# Patient Record
Sex: MALE | Race: WHITE | NOT HISPANIC OR LATINO | Employment: OTHER | URBAN - METROPOLITAN AREA
[De-identification: names, ages, dates, MRNs, and addresses within clinical notes are randomized per-mention and may not be internally consistent; named-entity substitution may affect disease eponyms.]

---

## 2017-02-28 ENCOUNTER — ALLIED HEALTH/NURSE VISIT (OUTPATIENT)
Dept: FAMILY MEDICINE | Facility: CLINIC | Age: 52
End: 2017-02-28
Payer: COMMERCIAL
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
## 2017-02-28 DIAGNOSIS — Z23 NEED FOR VACCINATION: Primary | ICD-10-CM

## 2017-02-28 PROCEDURE — 90471 IMMUNIZATION ADMIN: CPT

## 2017-02-28 PROCEDURE — 99207 ZZC NO CHARGE NURSE ONLY: CPT

## 2017-02-28 PROCEDURE — 90714 TD VACC NO PRESV 7 YRS+ IM: CPT

## 2017-02-28 NOTE — NURSING NOTE
Screening Questionnaire for Adult Immunization    Are you sick today?   No   Do you have allergies to medications, food, a vaccine component or latex?   No   Have you ever had a serious reaction after receiving a vaccination?   No   Do you have a long-term health problem with heart disease, lung disease, asthma, kidney disease, metabolic disease (e.g. diabetes), anemia, or other blood disorder?   No   Do you have cancer, leukemia, HIV/AIDS, or any other immune system problem?   No   In the past 3 months, have you taken medications that affect  your immune system, such as prednisone, other steroids, or anticancer drugs; drugs for the treatment of rheumatoid arthritis, Crohn s disease, or psoriasis; or have you had radiation treatments?   No   Have you had a seizure, or a brain or other nervous system problem?   No   During the past year, have you received a transfusion of blood or blood     products, or been given immune (gamma) globulin or antiviral drug?   No   For women: Are you pregnant or is there a chance you could become        pregnant during the next month?   No   Have you received any vaccinations in the past 4 weeks?   No     Immunization questionnaire answers were all negative.      MNVFC doesn't apply on this patient    Per orders of  , injection of TD given by Pina Nettles. Patient instructed to remain in clinic for 20 minutes afterwards, and to report any adverse reaction to me immediately.       Screening performed by Pina Nettles on 2/28/2017 at 3:24 PM.

## 2017-02-28 NOTE — MR AVS SNAPSHOT
"              After Visit Summary   2017    Chepe Elkins    MRN: [id number]           Patient Information     Date Of Birth          1965        Visit Information        Provider Department      2017 3:15 PM FL NIKKI EDWARDS/LPN [facility]        Today's Diagnoses     Need for vaccination    -  1    Puncture wound of skin from metal nail           Follow-ups after your visit        Who to contact     If you have questions or need follow up information about today's clinic visit or your schedule please contact [facility] directly at [phone].  Normal or non-critical lab and imaging results will be communicated to you by Inxerohart, letter or phone within 4 business days after the clinic has received the results. If you do not hear from us within 7 days, please contact the clinic through [email or web address] or phone. If you have a critical or abnormal lab result, we will notify you by phone as soon as possible.  Submit refill requests through [employer] or call your pharmacy and they will forward the refill request to us. Please allow 3 business days for your refill to be completed.          Additional Information About Your Visit        MyChart Information     [employer] lets you send messages to your doctor, view your test results, renew your prescriptions, schedule appointments and more. To sign up, go to [email or web address] . Click on \"Log in\" on the left side of the screen, which will take you to the Welcome page. Then click on \"Sign up Now\" on the right side of the page.     You will be asked to enter the access code listed below, as well as some personal information. Please follow the directions to create your username and password.     Your access code is: [email or web address]  Expires: 2017  3:24 PM     Your access code will  in 90 days. If you need help or a new code, please call your [facility] or [phone].        Care EveryWhere ID     This is your Care " EveryWhere ID. This could be used by other organizations to access your [place] medical records  [id number]         Blood Pressure from Last 3 Encounters:   No data found for BP    Weight from Last 3 Encounters:   No data found for Wt              We Performed the Following     1st  Administration  [[id number]]     TD PRSERV FREE >=7 YRS ADS IM [54304]        Primary Care Provider Office Phone # Fax #    Keanu Fraser [phone]       [facility] [address] [facility] [address]        Thank you!     Thank you for choosing [facility]  for your care. Our goal is always to provide you with excellent care. Hearing back from our patients is one way we can continue to improve our services. Please take a few minutes to complete the written survey that you may receive in the mail after your visit with us. Thank you!             Your Updated Medication List - Protect others around you: Learn how to safely use, store and throw away your medicines at www.disposemymeds.org.      Notice  As of 2/28/2017  3:24 PM    You have not been prescribed any medications.

## 2018-02-26 ENCOUNTER — RADIANT APPOINTMENT (OUTPATIENT)
Dept: GENERAL RADIOLOGY | Facility: CLINIC | Age: 53
End: 2018-02-26
Attending: FAMILY MEDICINE
Payer: COMMERCIAL

## 2018-02-26 ENCOUNTER — OFFICE VISIT (OUTPATIENT)
Dept: FAMILY MEDICINE | Facility: CLINIC | Age: 53
End: 2018-02-26
Payer: COMMERCIAL

## 2018-02-26 VITALS
BODY MASS INDEX: 42.66 KG/M2 | SYSTOLIC BLOOD PRESSURE: 138 MMHG | TEMPERATURE: 98.3 F | HEIGHT: 72 IN | DIASTOLIC BLOOD PRESSURE: 74 MMHG | WEIGHT: 315 LBS | RESPIRATION RATE: 20 BRPM | HEART RATE: 84 BPM

## 2018-02-26 DIAGNOSIS — N52.9 ERECTILE DYSFUNCTION, UNSPECIFIED ERECTILE DYSFUNCTION TYPE: ICD-10-CM

## 2018-02-26 DIAGNOSIS — Z23 NEED FOR PROPHYLACTIC VACCINATION AND INOCULATION AGAINST INFLUENZA: ICD-10-CM

## 2018-02-26 DIAGNOSIS — M25.561 ACUTE PAIN OF RIGHT KNEE: ICD-10-CM

## 2018-02-26 DIAGNOSIS — Z00.00 ROUTINE GENERAL MEDICAL EXAMINATION AT A HEALTH CARE FACILITY: Primary | ICD-10-CM

## 2018-02-26 DIAGNOSIS — Z13.6 CARDIOVASCULAR SCREENING; LDL GOAL LESS THAN 130: ICD-10-CM

## 2018-02-26 DIAGNOSIS — R35.0 URINARY FREQUENCY: ICD-10-CM

## 2018-02-26 DIAGNOSIS — E66.01 MORBID OBESITY (H): ICD-10-CM

## 2018-02-26 DIAGNOSIS — Z12.11 SPECIAL SCREENING FOR MALIGNANT NEOPLASMS, COLON: ICD-10-CM

## 2018-02-26 LAB
ALBUMIN UR-MCNC: NEGATIVE MG/DL
APPEARANCE UR: CLEAR
BILIRUB UR QL STRIP: NEGATIVE
COLOR UR AUTO: YELLOW
GLUCOSE UR STRIP-MCNC: 500 MG/DL
HGB UR QL STRIP: NEGATIVE
KETONES UR STRIP-MCNC: NEGATIVE MG/DL
LEUKOCYTE ESTERASE UR QL STRIP: NEGATIVE
NITRATE UR QL: NEGATIVE
PH UR STRIP: 5.5 PH (ref 5–7)
SOURCE: ABNORMAL
SP GR UR STRIP: 1.01 (ref 1–1.03)
UROBILINOGEN UR STRIP-ACNC: 0.2 EU/DL (ref 0.2–1)

## 2018-02-26 PROCEDURE — 90686 IIV4 VACC NO PRSV 0.5 ML IM: CPT | Performed by: FAMILY MEDICINE

## 2018-02-26 PROCEDURE — 73560 X-RAY EXAM OF KNEE 1 OR 2: CPT | Mod: RT

## 2018-02-26 PROCEDURE — 99213 OFFICE O/P EST LOW 20 MIN: CPT | Mod: 25 | Performed by: FAMILY MEDICINE

## 2018-02-26 PROCEDURE — 99396 PREV VISIT EST AGE 40-64: CPT | Performed by: FAMILY MEDICINE

## 2018-02-26 PROCEDURE — 81003 URINALYSIS AUTO W/O SCOPE: CPT | Performed by: FAMILY MEDICINE

## 2018-02-26 PROCEDURE — 90471 IMMUNIZATION ADMIN: CPT | Performed by: FAMILY MEDICINE

## 2018-02-26 RX ORDER — SILDENAFIL 50 MG/1
50 TABLET, FILM COATED ORAL DAILY PRN
Qty: 6 TABLET | Refills: 11 | Status: SHIPPED | OUTPATIENT
Start: 2018-02-26 | End: 2018-02-28

## 2018-02-26 ASSESSMENT — PAIN SCALES - GENERAL: PAINLEVEL: MILD PAIN (2)

## 2018-02-26 NOTE — MR AVS SNAPSHOT
After Visit Summary   2/26/2018    Chepe Elkins    MRN: [id number]           Patient Information     Date Of Birth          1965        Visit Information        Provider Department      2/26/2018 6:20 PM Bean Eduardo MD [facility]        Today's Diagnoses     Routine general medical examination at a health care facility    -  1    Morbid obesity (H)        Erectile dysfunction, unspecified erectile dysfunction type        Acute pain of right knee        Urinary frequency        CARDIOVASCULAR SCREENING; LDL GOAL LESS THAN 130        Special screening for malignant neoplasms, colon        Need for prophylactic vaccination and inoculation against influenza          Care Instructions      Preventive Health Recommendations  Male Ages 50 - 64    Yearly exam:             See your health care provider every year in order to  o   Review health changes.   o   Discuss preventive care.    o   Review your medicines if your doctor has prescribed any.     Have a cholesterol test every 5 years, or more frequently if you are at risk for high cholesterol/heart disease.     Have a diabetes test (fasting glucose) every three years. If you are at risk for diabetes, you should have this test more often.     Have a colonoscopy at age 50, or have a yearly FIT test (stool test). These exams will check for colon cancer.      Talk with your health care provider about whether or not a prostate cancer screening test (PSA) is right for you.    You should be tested each year for STDs (sexually transmitted diseases), if you re at risk.     Shots: Get a flu shot each year. Get a tetanus shot every 10 years.     Nutrition:    Eat at least 5 servings of fruits and vegetables daily.     Eat whole-grain bread, whole-wheat pasta and brown rice instead of white grains and rice.     Talk to your provider about Calcium and Vitamin D.     Lifestyle    Exercise for at least 150 minutes a week (30 minutes a day, 5  days a week). This will help you control your weight and prevent disease.     Limit alcohol to one drink per day.     No smoking.     Wear sunscreen to prevent skin cancer.     See your dentist every six months for an exam and cleaning.     See your eye doctor every 1 to 2 years.  ASSESSMENT/PLAN:                                                    Lifestyle recommendations:regular exercise, at least 150 minutes per week (average 30 minutes 5 times a week)  good job on losing weight!  keep working on losing weight (ideal BMI-body mass index is <25)  always wear seat belt  The following exams/tests were recommended and discussed for health maintenance:  Colon cancer screening options most commonly used are: 1. Colonoscopy (colon scope) and 2. FIT (stool test).  FIT testing to check for hidden blood in the stool is a colon cancer screening test which should be done once yearly if normal.  If abnormal, a colonoscopy is recommended.    Prostate cancer screening is optional starting at age 50 if normal risk, age 40 or 45 for high risk men (strong family history or blacks.)  Screening can include digital rectal exam and PSA blood test.  Prostate cancer screening-PSA test due to bladder symptoms.     (Z00.00) Routine general medical examination at a health care facility  (primary encounter diagnosis)    (E66.01) Morbid obesity (H)  Comment:   Plan: Basic metabolic panel, Lipid panel reflex to         direct LDL Fasting          (N52.9) Erectile dysfunction, unspecified erectile dysfunction type  Comment:   Plan: Check PSA  There are medications like viagra that can help with erections.   Try Viagra for erectile dysfunction.  There are several different strengths of the Viaga pills.  Take Viagra 50mg pills 30min to 4 hours before planned intercourse and food may delay absorption.  You could try 1/2 dose if side effects on the 50mg or try 100 mg (2 pills) if needed.  Taking more than 100mg will not help.  Potential side  effects including flushing, headache, and visual symptoms.  There have been rare reported cases of possibly serious vision changes including loss of vision.  Also reviewed drug interactions including nitrates-do not use with nitro medications.        (M25.561) Acute pain of right knee  Comment: There is some osteoarthritis but recent injury.   Plan: CANCELED: XR Knee Standing Right 2 Views          Ibuprofen 200mg OTC may be taken up to 4 pills 4 times a day to the maximum of 3200mg or 16 pills daily as needed for pain.   Take with food.  Don't take with aspirin, Aleve or other antiinflammatory medication or with warfarin.   OK for ice or heat.   If not improving in the next few weeks, we can do an MRI of the knee to check cartilage and ligaments.     (R35.0) Urinary frequency  Comment: May be prostate problem  Plan: PSA, screen, *UA reflex to Microscopic and         Culture ([place] [facility] (except         [place] and [place])        Check for prostate cancer.   Cut back on fluids.    If you continue to have the urine frequency, there may be medications that can help.  We sometimes check ultrasound of bladder to make sure you are emptying OK.     (Z13.6) CARDIOVASCULAR SCREENING; LDL GOAL LESS THAN 130  Comment:   Plan: Lipid panel reflex to direct LDL Fasting        Come in for fasting blood tests (8 hours)    (Z12.11) Special screening for malignant neoplasms, colon  Comment:   Plan: Fecal colorectal cancer screen (FIT)        Complete FIT colon cancer screening test and send in the mail.      (Z23) Need for prophylactic vaccination and inoculation against influenza  Comment:   Plan: FLU VAC, SPLIT VIRUS IM > 3 YO (QUADRIVALENT)         [26280], Vaccine Administration, Initial         [70386]          Follow-ups after your visit        Future tests that were ordered for you today     Open Future Orders        Priority Expected Expires Ordered    Fecal colorectal cancer screen (FIT) Routine  "3/19/2018 2018 2018    PSA, screen Routine 2018 3/26/2018 2018    Basic metabolic panel Routine 2018 3/26/2018 2018    Lipid panel reflex to direct LDL Fasting Routine 2018 3/26/2018 2018            Who to contact     If you have questions or need follow up information about today's clinic visit or your schedule please contact [facility] directly at [phone].  Normal or non-critical lab and imaging results will be communicated to you by Hyperpiahart, letter or phone within 4 business days after the clinic has received the results. If you do not hear from us within 7 days, please contact the clinic through Hyperpiahart or phone. If you have a critical or abnormal lab result, we will notify you by phone as soon as possible.  Submit refill requests through [employer] or call your pharmacy and they will forward the refill request to us. Please allow 3 business days for your refill to be completed.          Additional Information About Your Visit        [employer] Information     [employer] lets you send messages to your doctor, view your test results, renew your prescriptions, schedule appointments and more. To sign up, go to [email or web address] . Click on \"Log in\" on the left side of the screen, which will take you to the Welcome page. Then click on \"Sign up Now\" on the right side of the page.     You will be asked to enter the access code listed below, as well as some personal information. Please follow the directions to create your username and password.     Your access code is: [email or web address]  Expires: 2018  7:48 PM     Your access code will  in 90 days. If you need help or a new code, please call your [place] clinic or [phone].        Care EveryWhere ID     This is your Care EveryWhere ID. This could be used by other organizations to access your [place] medical records  [id number]        Your Vitals Were     Pulse Temperature Respirations Height BMI " "(Body Mass Index)       84 98.3  F (36.8  C) 20 6' 0.25\" (1.835 m) 42.83 kg/m2        Blood Pressure from Last 3 Encounters:   02/26/18 138/74    Weight from Last 3 Encounters:   02/26/18 (!) 318 lb (144.2 kg)              We Performed the Following     *UA reflex to Microscopic and Culture ([place] and [facility] (except [place] and [place])     FLU VAC, SPLIT VIRUS IM > 3 YO (QUADRIVALENT) [77408]     Vaccine Administration, Initial [71473]          Today's Medication Changes          These changes are accurate as of 2/26/18  7:48 PM.  If you have any questions, ask your nurse or doctor.               Start taking these medicines.        Dose/Directions    sildenafil 50 MG tablet   Commonly known as:  VIAGRA   Used for:  Erectile dysfunction, unspecified erectile dysfunction type   Started by:  Bean Eduardo MD        Dose:  50 mg   Take 1 tablet (50 mg) by mouth daily as needed 30 min to 4 hrs before sex. Do not use with nitroglycerin, terazosin or doxazosin.   Quantity:  6 tablet   Refills:  11            Where to get your medicines      These medications were sent to [facility] [address] [address]     Phone:  [phone]     sildenafil 50 MG tablet                Primary Care Provider Office Phone # Fax #    Keanu Cedrick Fraser [phone]       [facility] [address] [place] [address]        Equal Access to Services     DAVID COTTO AH: Hadii aad ku hadasho Soomaali, waaxda luqadaha, qaybta kaalmada adeegyada, laine hummel. So [facility] [phone].    ATENCIÓN: Si habla gianlucaañol, tiene a christie disposición servicios gratuitos de asistencia lingüística. Llame al [phone].    We comply with applicable federal civil rights laws and [place] laws. We do not discriminate on the basis of race, color, national origin, age, disability, sex, sexual orientation, or gender " identity.            Thank you!     Thank you for choosing [facility]  for your care. Our goal is always to provide you with excellent care. Hearing back from our patients is one way we can continue to improve our services. Please take a few minutes to complete the written survey that you may receive in the mail after your visit with us. Thank you!             Your Updated Medication List - Protect others around you: Learn how to safely use, store and throw away your medicines at www.disposemymeds.org.          This list is accurate as of 2/26/18  7:48 PM.  Always use your most recent med list.                   Brand Name Dispense Instructions for use Diagnosis    IBUPROFEN PO      Take 800 mg by mouth 2 times daily as needed for moderate pain        MULTI VITAMIN MENS PO      Take 1 tablet by mouth daily        sildenafil 50 MG tablet    VIAGRA    6 tablet    Take 1 tablet (50 mg) by mouth daily as needed 30 min to 4 hrs before sex. Do not use with nitroglycerin, terazosin or doxazosin.    Erectile dysfunction, unspecified erectile dysfunction type

## 2018-02-27 ENCOUNTER — TELEPHONE (OUTPATIENT)
Dept: FAMILY MEDICINE | Facility: CLINIC | Age: 53
End: 2018-02-27

## 2018-02-27 DIAGNOSIS — R35.0 URINARY FREQUENCY: ICD-10-CM

## 2018-02-27 DIAGNOSIS — N52.9 ERECTILE DYSFUNCTION, UNSPECIFIED ERECTILE DYSFUNCTION TYPE: ICD-10-CM

## 2018-02-27 DIAGNOSIS — E66.01 MORBID OBESITY (H): ICD-10-CM

## 2018-02-27 DIAGNOSIS — Z13.6 CARDIOVASCULAR SCREENING; LDL GOAL LESS THAN 130: ICD-10-CM

## 2018-02-27 LAB
ANION GAP SERPL CALCULATED.3IONS-SCNC: 7 MMOL/L (ref 3–14)
BUN SERPL-MCNC: 16 MG/DL (ref 7–30)
CALCIUM SERPL-MCNC: 9.3 MG/DL (ref 8.5–10.1)
CHLORIDE SERPL-SCNC: 98 MMOL/L (ref 94–109)
CHOLEST SERPL-MCNC: 181 MG/DL
CO2 SERPL-SCNC: 27 MMOL/L (ref 20–32)
CREAT SERPL-MCNC: 0.64 MG/DL (ref 0.66–1.25)
GFR SERPL CREATININE-BSD FRML MDRD: >90 ML/MIN/1.7M2
GLUCOSE SERPL-MCNC: 325 MG/DL (ref 70–99)
HDLC SERPL-MCNC: 34 MG/DL
LDLC SERPL CALC-MCNC: 93 MG/DL
NONHDLC SERPL-MCNC: 147 MG/DL
POTASSIUM SERPL-SCNC: 4.5 MMOL/L (ref 3.4–5.3)
PSA SERPL-ACNC: 0.99 UG/L (ref 0–4)
SODIUM SERPL-SCNC: 132 MMOL/L (ref 133–144)
TRIGL SERPL-MCNC: 271 MG/DL

## 2018-02-27 PROCEDURE — G0103 PSA SCREENING: HCPCS | Performed by: FAMILY MEDICINE

## 2018-02-27 PROCEDURE — 36415 COLL VENOUS BLD VENIPUNCTURE: CPT | Performed by: FAMILY MEDICINE

## 2018-02-27 PROCEDURE — 80048 BASIC METABOLIC PNL TOTAL CA: CPT | Performed by: FAMILY MEDICINE

## 2018-02-27 PROCEDURE — 80061 LIPID PANEL: CPT | Performed by: FAMILY MEDICINE

## 2018-02-27 NOTE — PATIENT INSTRUCTIONS
Preventive Health Recommendations  Male Ages 50   64    Yearly exam:             See your health care provider every year in order to  o   Review health changes.   o   Discuss preventive care.    o   Review your medicines if your doctor has prescribed any.     Have a cholesterol test every 5 years, or more frequently if you are at risk for high cholesterol/heart disease.     Have a diabetes test (fasting glucose) every three years. If you are at risk for diabetes, you should have this test more often.     Have a colonoscopy at age 50, or have a yearly FIT test (stool test). These exams will check for colon cancer.      Talk with your health care provider about whether or not a prostate cancer screening test (PSA) is right for you.    You should be tested each year for STDs (sexually transmitted diseases), if you re at risk.     Shots: Get a flu shot each year. Get a tetanus shot every 10 years.     Nutrition:    Eat at least 5 servings of fruits and vegetables daily.     Eat whole-grain bread, whole-wheat pasta and brown rice instead of white grains and rice.     Talk to your provider about Calcium and Vitamin D.     Lifestyle    Exercise for at least 150 minutes a week (30 minutes a day, 5 days a week). This will help you control your weight and prevent disease.     Limit alcohol to one drink per day.     No smoking.     Wear sunscreen to prevent skin cancer.     See your dentist every six months for an exam and cleaning.     See your eye doctor every 1 to 2 years.  ASSESSMENT/PLAN:                                                    Lifestyle recommendations:regular exercise, at least 150 minutes per week (average 30 minutes 5 times a week)  good job on losing weight!  keep working on losing weight (ideal BMI-body mass index is <25)  always wear seat belt  The following exams/tests were recommended and discussed for health maintenance:  Colon cancer screening options most commonly used are: 1. Colonoscopy (colon  scope) and 2. FIT (stool test).  FIT testing to check for hidden blood in the stool is a colon cancer screening test which should be done once yearly if normal.  If abnormal, a colonoscopy is recommended.    Prostate cancer screening is optional starting at age 50 if normal risk, age 40 or 45 for high risk men (strong family history or blacks.)  Screening can include digital rectal exam and PSA blood test.  Prostate cancer screening-PSA test due to bladder symptoms.     (Z00.00) Routine general medical examination at a health care facility  (primary encounter diagnosis)    (E66.01) Morbid obesity (H)  Comment:   Plan: Basic metabolic panel, Lipid panel reflex to         direct LDL Fasting          (N52.9) Erectile dysfunction, unspecified erectile dysfunction type  Comment:   Plan: Check PSA  There are medications like viagra that can help with erections.   Try Viagra for erectile dysfunction.  There are several different strengths of the Viaga pills.  Take Viagra 50mg pills 30min to 4 hours before planned intercourse and food may delay absorption.  You could try 1/2 dose if side effects on the 50mg or try 100 mg (2 pills) if needed.  Taking more than 100mg will not help.  Potential side effects including flushing, headache, and visual symptoms.  There have been rare reported cases of possibly serious vision changes including loss of vision.  Also reviewed drug interactions including nitrates-do not use with nitro medications.        (M25.561) Acute pain of right knee  Comment: There is some osteoarthritis but recent injury.   Plan: CANCELED: XR Knee Standing Right 2 Views          Ibuprofen 200mg OTC may be taken up to 4 pills 4 times a day to the maximum of 3200mg or 16 pills daily as needed for pain.   Take with food.  Don't take with aspirin, Aleve or other antiinflammatory medication or with warfarin.   OK for ice or heat.   If not improving in the next few weeks, we can do an MRI of the knee to check cartilage  and ligaments.     (R35.0) Urinary frequency  Comment: May be prostate problem  Plan: PSA, screen, *UA reflex to Microscopic and         Culture ([place] [facility] (except         [place] and [place])        Check for prostate cancer.   Cut back on fluids.    If you continue to have the urine frequency, there may be medications that can help.  We sometimes check ultrasound of bladder to make sure you are emptying OK.     (Z13.6) CARDIOVASCULAR SCREENING; LDL GOAL LESS THAN 130  Comment:   Plan: Lipid panel reflex to direct LDL Fasting        Come in for fasting blood tests (8 hours)    (Z12.11) Special screening for malignant neoplasms, colon  Comment:   Plan: Fecal colorectal cancer screen (FIT)        Complete FIT colon cancer screening test and send in the mail.      (Z23) Need for prophylactic vaccination and inoculation against influenza  Comment:   Plan: FLU VAC, SPLIT VIRUS IM > 3 YO (QUADRIVALENT)         [52244], Vaccine Administration, Initial         [52893]

## 2018-02-27 NOTE — PROGRESS NOTES
SUBJECTIVE:   CC: Chepe Elkins is an 52 year old male who presents for preventative health visit.   Chief Complaint   Patient presents with     Physical      Check R knee pain.  Takes ibuprofen prn.  Takes fish oil for joints.  Other joints oK.  No major injury recently he had just been walking around in the snow.  Onset of symptoms: 3-4 weeks.  Course of symptoms over time: same.    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  He had a follow-up urine which was normal.   Nocturia 3-4 times at night.  Some urgency.    Difficulty with erections.  Onset of symptoms: 7-8 months.  No problems in the past. Has erections but not as firm and not reliable.  Unable to have intercourse for months.     Healthy Habits:  Answers for HPI/ROS submitted by the patient on 2/26/2018   Annual Exam:  Getting at least 3 servings of Calcium per day:: Yes  Bi-annual eye exam:: NO  Dental care twice a year:: Yes  Sleep apnea or symptoms of sleep apnea:: None  Taking medications regularly:: Yes  Medication side effects:: None  Additional concerns today:: YES  PHQ-2 Score: 0    Today's PHQ-2 Score:   PHQ-2 ( 1999 [employer]) 2/26/2018   Q1: Little interest or pleasure in doing things 0   Q2: Feeling down, depressed or hopeless 0   PHQ-2 Score 0   Q1: Little interest or pleasure in doing things Not at all   Q2: Feeling down, depressed or hopeless Not at all   PHQ-2 Score 0     Abuse: Current or Past(Physical, Sexual or Emotional)- No  Do you feel safe in your environment - Yes    Social History   Substance Use Topics     Smoking status: Former Smoker     Years: 27.00     Types: Cigarettes     Quit date: 2/26/2010     Smokeless tobacco: Never Used     Alcohol use Yes      Comment: rarely      If you drink alcohol do you typically have >3 drinks per day or >7 drinks per week? No                      Last PSA: No results found for: PSA  There are no active problems to display for this patient.       Family history:  CV disease: uncle in his  "80s  Prostate cancer: no  Colon cancer: no    Multivitamin or Vit D use:See epic    Vaccines:current tetanus     Past Colon cancer screening:no    Occupation: construction.     ROS:  General: POSITIVE for:, weight loss, 40# in the past 2 years.   Trying to eat less.  Some exercise.   Eyes: Negative for vision changes or eye problems  ENT: No problems with ears, nose or throat.  No difficulty swallowing.  Resp: No coughing, wheezing or shortness of breath  CV: No chest pains or palpitations  GI: No nausea, vomiting,  heartburn, abdominal pain, diarrhea, constipation or change in bowel habits  : POSITIVE for:, urinary frequency every couple hours, urgency, nocturia x 4-5.  Drinks near a gallon of water a day.   Musculoskeletal: POSITIVE  for:, pain in knee  Neurologic: No headaches, numbness, tingling, weakness, problems with balance or coordination  Psychiatric: No problems with anxiety, depression or mental health  Heme/immune/allergy: No history of bleeding or clotting problems or anemia.  No allergies or immune system problems  Endocrine: No history of thyroid disease, diabetes or other endocrine disorders  Skin: No rashes,worrisome lesions or skin problems    OBJECTIVE:                                                    OBJECTIVE:Blood pressure 138/74, pulse 84, temperature 98.3  F (36.8  C), resp. rate 20, height 6' 0.25\" (1.835 m), weight (!) 318 lb (144.2 kg). BMI=Body mass index is 42.83 kg/(m^2).  GENERAL APPEARANCE ADULT: Alert, no acute distress, morbidly obese  EYES: PERRL, EOM normal, conjunctiva and lids normal  HENT: Ears and TMs normal, oral mucosa and posterior oropharynx normal  NECK: No adenopathy,masses or thyromegaly  RESP: lungs clear to auscultation   CV: normal rate, regular rhythm, no murmur or gallop  ABDOMEN: soft, no organomegaly, masses or tenderness, diastasis recti   (male): penis, scrotum, testes normal, no hernias, rectal exam normal, prostate normal  MS: knee exam " swelling-present, tenderness to palpation-medial and lateral joint spaces, effusion-present, range of motion flexion mildly reduced, extension mildly reduced, positive Morenita sign-  Knee xrays: bilateral medial joint space narrowing by my interpretation, I independently visualized the xrays.     ASSESSMENT/PLAN:                                                    Lifestyle recommendations:regular exercise, at least 150 minutes per week (average 30 minutes 5 times a week)  good job on losing weight!  keep working on losing weight (ideal BMI-body mass index is <25)  always wear seat belt  The following exams/tests were recommended and discussed for health maintenance:  Colon cancer screening options most commonly used are: 1. Colonoscopy (colon scope) and 2. FIT (stool test).  FIT testing to check for hidden blood in the stool is a colon cancer screening test which should be done once yearly if normal.  If abnormal, a colonoscopy is recommended.    Prostate cancer screening is optional starting at age 50 if normal risk, age 40 or 45 for high risk men (strong family history or blacks.)  Screening can include digital rectal exam and PSA blood test.  Prostate cancer screening-PSA test due to bladder symptoms.     (Z00.00) Routine general medical examination at a health care facility  (primary encounter diagnosis)    (E66.01) Morbid obesity (H)  Comment:   Plan: Basic metabolic panel, Lipid panel reflex to         direct LDL Fasting          (N52.9) Erectile dysfunction, unspecified erectile dysfunction type  Comment:   Plan: Check PSA  There are medications like viagra that can help with erections.   Try Viagra for erectile dysfunction.  There are several different strengths of the Viaga pills.  Take Viagra 50mg pills 30min to 4 hours before planned intercourse and food may delay absorption.  You could try 1/2 dose if side effects on the 50mg or try 100 mg (2 pills) if needed.  Taking more than 100mg will not help.   Potential side effects including flushing, headache, and visual symptoms.  There have been rare reported cases of possibly serious vision changes including loss of vision.  Also reviewed drug interactions including nitrates-do not use with nitro medications.        (M25.561) Acute pain of right knee  Comment: There is some osteoarthritis but recent injury.   Plan: CANCELED: XR Knee Standing Right 2 Views          Ibuprofen 200mg OTC may be taken up to 4 pills 4 times a day to the maximum of 3200mg or 16 pills daily as needed for pain.   Take with food.  Don't take with aspirin, Aleve or other antiinflammatory medication or with warfarin.   OK for ice or heat.   If not improving in the next few weeks, we can do an MRI of the knee to check cartilage and ligaments.     (R35.0) Urinary frequency  Comment: May be prostate problem  Plan: PSA, screen, *UA reflex to Microscopic and         Culture ([facility] (except         [place] and [place])        Check for prostate cancer.   Cut back on fluids.    If you continue to have the urine frequency, there may be medications that can help.  We sometimes check ultrasound of bladder to make sure you are emptying OK.     (Z13.6) CARDIOVASCULAR SCREENING; LDL GOAL LESS THAN 130  Comment:   Plan: Lipid panel reflex to direct LDL Fasting        Come in for fasting blood tests (8 hours)    (Z12.11) Special screening for malignant neoplasms, colon  Comment:   Plan: Fecal colorectal cancer screen (FIT)        Complete FIT colon cancer screening test and send in the mail.      (Z23) Need for prophylactic vaccination and inoculation against influenza  Comment:   Plan: FLU VAC, SPLIT VIRUS IM > 3 YO (QUADRIVALENT)         [90396], Vaccine Administration, Initial         [76461]              COUNSELING:  Reviewed preventive health counseling, as reflected in patient instructions  Special attention given to:        Colon cancer screening       Prostate cancer  "screening    BP Screening:   Last 3 BP Readings:    BP Readings from Last 3 Encounters:   02/26/18 138/74       The following was recommended to the patient:  Re-screen BP within a year and recommended lifestyle modifications   reports that he quit smoking about 8 years ago. His smoking use included Cigarettes. He quit after 27.00 years of use. He has never used smokeless tobacco.    Estimated body mass index is 29.42 kg/(m^2) as calculated from the following:    Height as of this encounter: 6' 0.25\" (1.835 m).    Weight as of this encounter: 218 lb 6.4 oz (99.1 kg).   Weight management plan: Discussed healthy diet and exercise guidelines and patient will follow up in 12 months in clinic to re-evaluate.    Counseling Resources:  ATP IV Guidelines  Pooled Cohorts Equation Calculator  FRAX Risk Assessment  ICSI Preventive Guidelines  Dietary Guidelines for Americans, 2010  USDA's MyPlate  ASA Prophylaxis  Lung CA Screening    Bean Eduardo MD  [facility]  "

## 2018-02-27 NOTE — NURSING NOTE
"Chief Complaint   Patient presents with     Physical       Initial /74 (BP Location: Right arm, Patient Position: Chair, Cuff Size: Adult Large)  Pulse 84  Temp 98.3  F (36.8  C)  Resp 20  Ht 6' 0.25\" (1.835 m)  Wt 218 lb 6.4 oz (99.1 kg)  BMI 29.42 kg/m2 Estimated body mass index is 29.42 kg/(m^2) as calculated from the following:    Height as of this encounter: 6' 0.25\" (1.835 m).    Weight as of this encounter: 218 lb 6.4 oz (99.1 kg).      Health Maintenance that is potentially due pending provider review:  colonoscopy    Gave pt phone number/pended order to schedule mammo and/or colonoscopy(or FIT)    Is there anyone who you would like to be able to receive your results? Yes wife- Lesa.  If yes have patient fill out SINDY    "

## 2018-02-27 NOTE — PROGRESS NOTES
Injectable Influenza Immunization Documentation    1.  Is the person to be vaccinated sick today?   No    2. Does the person to be vaccinated have an allergy to a component   of the vaccine?   No  Egg Allergy Algorithm Link    3. Has the person to be vaccinated ever had a serious reaction   to influenza vaccine in the past?   No    4. Has the person to be vaccinated ever had Guillain-Barré syndrome?   No    Form completed by Dionne Scott CMA  Prior to injection verified patient identity using patient's name and date of birth.  Due to injection administration, patient instructed to remain in clinic for 15 minutes  afterwards, and to report any adverse reaction to me immediately.  Dionne Scott CMA

## 2018-02-28 ENCOUNTER — TELEPHONE (OUTPATIENT)
Dept: FAMILY MEDICINE | Facility: CLINIC | Age: 53
End: 2018-02-28

## 2018-02-28 DIAGNOSIS — E11.9 DIABETES MELLITUS, TYPE 2 (H): Primary | ICD-10-CM

## 2018-02-28 DIAGNOSIS — Z12.11 SPECIAL SCREENING FOR MALIGNANT NEOPLASMS, COLON: ICD-10-CM

## 2018-02-28 PROCEDURE — 82274 ASSAY TEST FOR BLOOD FECAL: CPT | Performed by: FAMILY MEDICINE

## 2018-02-28 RX ORDER — SILDENAFIL CITRATE 20 MG/1
TABLET ORAL
Qty: 50 TABLET | Refills: 11 | Status: SHIPPED | OUTPATIENT
Start: 2018-02-28 | End: 2019-02-22

## 2018-02-28 NOTE — TELEPHONE ENCOUNTER
Alesha, Rosie Rodas MD  P Nb Dr Eduardo Care Team                   Please call.     Chepe has diabetes mellitus.  Glucose (sugar) in urine and high in blood.  This is a direct result of his weight.  Many of his symptoms including the frequent urination at night and weight loss are from uncontrolled diabetes mellitus.   Sodium low, likely from high blood glucose.   Lipids abnormal with high triglycerides and low HDL.     PSA test (for prostate cancer screening) is normal.   PLAN: Most important is Weight loss, regular exercise with goal of 30 minutes most days of the week and eating a prudent diet (lots of fruits and vegetables, limiting unhealthy fats and excessive calories).   He should see diabetic educator and dietician.  We can do referrals for both.   He needs to start checking glucometers.  We can send prescription for equipment needed.  He should see clinic RN to get started on this.   Start metformin at regular 500mg twice daily once he starts checking glucometers.   See me within a couple weeks of starting to check blood glucose.   Check blood sugars at various times; in the morning when fasting, before meals and at bedtime.  You can also check 1-2 hours after a meal.  Normal fasting blood sugar is <100 generally <120 before meals and after meals is <140.  It is helpful to check at different times of the day to see how blood sugars are doing as a brief snapshot.     Keep a record of the readings to see if there are consistent highs or lows at certain times of the day.  This will help in adjusting medication.   Start by checking 2 times a day at varying times.  Keep log of glucometers.   He will eventually need medication to lower cholesterol.  We can discuss this at next visit.     ROSIE EDUARDO MD

## 2018-02-28 NOTE — LETTER
March 5, 2018      Chepe Elkins  [address] [address]        Dear s,    We are writing to inform you of your test results.    Your recent FIT test (colon cancer screening) was normal. Please repeat this test yearly.    Resulted Orders   Fecal colorectal cancer screen (FIT)   Result Value Ref [facility]    Occult Blood Scn FIT Negative NEG^Negative       If you have any questions or concerns, please call the clinic at the number listed above.       Sincerely,    Bean Eduardo MD/ ss

## 2018-02-28 NOTE — PROGRESS NOTES
Please call.    Chepe has diabetes mellitus.  Glucose (sugar) in urine and high in blood.  This is a direct result of his weight.  Many of his symptoms including the frequent urination at night and weight loss are from uncontrolled diabetes mellitus.   Sodium low, likely from high blood glucose.   Lipids abnormal with high triglycerides and low HDL.    PSA test (for prostate cancer screening) is normal.   PLAN: Most important is Weight loss, regular exercise with goal of 30 minutes most days of the week and eating a prudent diet (lots of fruits and vegetables, limiting unhealthy fats and excessive calories).  He should see diabetic educator and dietician.  We can do referrals for both.   He needs to start checking glucometers.  We can send prescription for equipment needed.  He should see clinic RN to get started on this.   Start metformin at regular 500mg twice daily once he starts checking glucometers.   See me within a couple weeks of starting to check blood glucose.   Check blood sugars at various times; in the morning when fasting, before meals and at bedtime.  You can also check 1-2 hours after a meal.  Normal fasting blood sugar is <100 generally <120 before meals and after meals is <140.  It is helpful to check at different times of the day to see how blood sugars are doing as a brief snapshot.    Keep a record of the readings to see if there are consistent highs or lows at certain times of the day.  This will help in adjusting medication.   Start by checking 2 times a day at varying times.  Keep log of glucometers.   He will eventually need medication to lower cholesterol.  We can discuss this at next visit.     ROSIE ORTEGA MD

## 2018-03-01 NOTE — TELEPHONE ENCOUNTER
Pt said he was called 2/28/18 told a Script & Meter with supplies would be sent to Pharmacy.  [facility] has not received any orders for pt. Please Advise.  Call pt to let him know they were faxed to Pharmacy.  Tele [phone]   [facility]

## 2018-03-02 ENCOUNTER — OFFICE VISIT (OUTPATIENT)
Dept: FAMILY MEDICINE | Facility: CLINIC | Age: 53
End: 2018-03-02
Payer: COMMERCIAL

## 2018-03-02 ENCOUNTER — TELEPHONE (OUTPATIENT)
Dept: FAMILY MEDICINE | Facility: CLINIC | Age: 53
End: 2018-03-02

## 2018-03-02 DIAGNOSIS — E11.9 DIABETES MELLITUS, TYPE 2 (H): Primary | ICD-10-CM

## 2018-03-02 PROCEDURE — 99207 ZZC NO CHARGE NURSE ONLY: CPT

## 2018-03-02 NOTE — MR AVS SNAPSHOT
After Visit Summary   3/2/2018    Chepe Elkins    MRN: [id number]           Patient Information     Date Of Birth          1965        Visit Information        Provider Department      3/2/2018 1:15 PM [place] [facility] RN [facility]        Today's Diagnoses     Diabetes mellitus, type 2 (H)    -  1       Follow-ups after your visit        Your next 10 appointments already scheduled     Mar 02, 2018  1:15 PM CST   SHORT with ARTURO JORDAN RN   [facility]    [address] [place] [id number] [phone]            Mar 19, 2018  6:00 PM CDT   SHORT with Bean Eduardo MD   [facility]    [address] [place] [address] [phone]              Who to contact     If you have questions or need follow up information about today's clinic visit or your schedule please contact [facility] directly at [phone].  Normal or non-critical lab and imaging results will be communicated to you by "[employer]hart, letter or phone within 4 business days after the clinic has received the results. If you do not hear from us within 7 days, please contact the clinic through [employer] (deja [employer]) or phone. If you have a critical or abnormal lab result, we will notify you by phone as soon as possible.  Submit refill requests through [employer] (deja [employer]) or call your pharmacy and they will forward the refill request to us. Please allow 3 business days for your refill to be completed.          Additional Information About Your Visit        "[employer]hart Information     [employer] ([employer]) gives you secure access to your electronic health record. If you see a primary care provider, you can also send messages to your care team and make appointments. If you have questions, please call your primary care clinic.  If you do not have a primary care provider, please call [phone] and they will assist you.        Care  EveryWhere ID     This is your Care EveryWhere ID. This could be used by other organizations to access your [facility] medical records  [id number]         Blood Pressure from Last 3 Encounters:   02/26/18 138/74    Weight from Last 3 Encounters:   02/26/18 (!) 318 lb (144.2 kg)              Today, you had the following     No orders found for display         Today's Medication Changes          These changes are accurate as of 3/2/18 12:56 PM.  If you have any questions, ask your nurse or doctor.               Start taking these medicines.        Dose/Directions    metFORMIN 500 MG tablet   Commonly known as:  GLUCOPHAGE   Used for:  Diabetes mellitus, type 2 (H)   Started by:  Bean Eduardo MD        Dose:  500 mg   Take 1 tablet (500 mg) by mouth 2 times daily (with meals)   Quantity:  60 tablet   Refills:  1            Where to get your medicines      These medications were sent to [facility] [place] [address]     Phone:  [phone]     metFORMIN 500 MG tablet                Primary Care Provider Office Phone # Fax #    Bean Eduardo [phone]       [address] [place] [address]        Equal Access to Services     DAVID COTTO AH: Hadii dilan barrazao Soeliza, waaxda luqadaha, qaybta kaalmada adeegyada, laine hummel. So [facility] [phone].    ATENCIÓN: Si habla español, tiene a christie disposición servicios gratuitos de asistencia lingüística. Llame al [phone].    We comply with applicable federal civil rights laws and [place] laws. We do not discriminate on the basis of race, color, national origin, age, disability, sex, sexual orientation, or gender identity.            Thank you!     Thank you for choosing [facility]  for your care. Our goal is always to provide you with excellent care. Hearing back from our patients is one way we can continue to  improve our services. Please take a few minutes to complete the written survey that you may receive in the mail after your visit with us. Thank you!             Your Updated Medication List - Protect others around you: Learn how to safely use, store and throw away your medicines at www.disposemymeds.org.          This list is accurate as of 3/2/18 12:56 PM.  Always use your most recent med list.                   Brand Name Dispense Instructions for use Diagnosis    blood glucose lancets standard    no brand specified    100 each    Use to test blood sugar 2-3 times daily or as directed.    Diabetes mellitus, type 2 (H)       blood glucose monitoring meter device kit    no brand specified    1 kit    Use to test blood sugar 2-3 times daily or as directed.    Diabetes mellitus, type 2 (H)       blood glucose monitoring test strip    no brand specified    100 strip    Use to test blood sugar 2-3 times daily or as directed.    Diabetes mellitus, type 2 (H)       IBUPROFEN PO      Take 800 mg by mouth 2 times daily as needed for moderate pain        metFORMIN 500 MG tablet    GLUCOPHAGE    60 tablet    Take 1 tablet (500 mg) by mouth 2 times daily (with meals)    Diabetes mellitus, type 2 (H)       MULTI VITAMIN MENS PO      Take 1 tablet by mouth daily        sildenafil 20 MG tablet    REVATIO    50 tablet    Sidenafil (Viagra) comes in 20mg strength pills. Take as many pills as needed up to maximum of 5 pills at a time prior to intercourse.    Erectile dysfunction, unspecified erectile dysfunction type

## 2018-03-02 NOTE — PROGRESS NOTES
Glucometer teaching done and patient demonstrated good technique and understanding of the process.  Dionne Barbosa RN

## 2018-03-04 LAB — HEMOCCULT STL QL IA: NEGATIVE

## 2018-03-19 ENCOUNTER — OFFICE VISIT (OUTPATIENT)
Dept: FAMILY MEDICINE | Facility: CLINIC | Age: 53
End: 2018-03-19
Payer: COMMERCIAL

## 2018-03-19 ENCOUNTER — APPOINTMENT (OUTPATIENT)
Dept: GENERAL RADIOLOGY | Facility: CLINIC | Age: 53
End: 2018-03-19
Attending: EMERGENCY MEDICINE
Payer: COMMERCIAL

## 2018-03-19 ENCOUNTER — APPOINTMENT (OUTPATIENT)
Dept: ULTRASOUND IMAGING | Facility: CLINIC | Age: 53
End: 2018-03-19
Attending: EMERGENCY MEDICINE
Payer: COMMERCIAL

## 2018-03-19 ENCOUNTER — HOSPITAL ENCOUNTER (EMERGENCY)
Facility: CLINIC | Age: 53
Discharge: HOME | End: 2018-03-19
Attending: EMERGENCY MEDICINE | Admitting: EMERGENCY MEDICINE
Payer: COMMERCIAL

## 2018-03-19 VITALS
BODY MASS INDEX: 41.75 KG/M2 | HEIGHT: 73 IN | DIASTOLIC BLOOD PRESSURE: 82 MMHG | RESPIRATION RATE: 16 BRPM | WEIGHT: 315 LBS | OXYGEN SATURATION: 93 % | TEMPERATURE: 98 F | SYSTOLIC BLOOD PRESSURE: 136 MMHG | HEART RATE: 84 BPM

## 2018-03-19 VITALS
WEIGHT: 315 LBS | RESPIRATION RATE: 18 BRPM | TEMPERATURE: 97.9 F | HEIGHT: 72 IN | SYSTOLIC BLOOD PRESSURE: 132 MMHG | DIASTOLIC BLOOD PRESSURE: 78 MMHG | BODY MASS INDEX: 42.66 KG/M2 | HEART RATE: 74 BPM

## 2018-03-19 DIAGNOSIS — E11.9 TYPE 2 DIABETES MELLITUS WITHOUT COMPLICATION, WITHOUT LONG-TERM CURRENT USE OF INSULIN (H): Primary | ICD-10-CM

## 2018-03-19 DIAGNOSIS — G89.29 CHRONIC PAIN OF RIGHT KNEE: ICD-10-CM

## 2018-03-19 DIAGNOSIS — M79.89 LEG SWELLING: ICD-10-CM

## 2018-03-19 DIAGNOSIS — E66.01 MORBID OBESITY (H): ICD-10-CM

## 2018-03-19 DIAGNOSIS — I82.431 ACUTE DEEP VEIN THROMBOSIS (DVT) OF POPLITEAL VEIN OF RIGHT LOWER EXTREMITY (H): ICD-10-CM

## 2018-03-19 DIAGNOSIS — M25.561 CHRONIC PAIN OF RIGHT KNEE: ICD-10-CM

## 2018-03-19 LAB
HBA1C MFR BLD: 12.5 % (ref 4.3–6)
TSH SERPL DL<=0.005 MIU/L-ACNC: 3.77 MU/L (ref 0.4–4)

## 2018-03-19 PROCEDURE — 99284 EMERGENCY DEPT VISIT MOD MDM: CPT | Mod: 25 | Performed by: EMERGENCY MEDICINE

## 2018-03-19 PROCEDURE — 99207 C FOOT EXAM  NO CHARGE: CPT | Performed by: FAMILY MEDICINE

## 2018-03-19 PROCEDURE — 99214 OFFICE O/P EST MOD 30 MIN: CPT | Performed by: FAMILY MEDICINE

## 2018-03-19 PROCEDURE — 36415 COLL VENOUS BLD VENIPUNCTURE: CPT | Performed by: FAMILY MEDICINE

## 2018-03-19 PROCEDURE — 25000132 ZZH RX MED GY IP 250 OP 250 PS 637: Performed by: EMERGENCY MEDICINE

## 2018-03-19 PROCEDURE — 84443 ASSAY THYROID STIM HORMONE: CPT | Performed by: FAMILY MEDICINE

## 2018-03-19 PROCEDURE — 99284 EMERGENCY DEPT VISIT MOD MDM: CPT | Mod: Z6 | Performed by: EMERGENCY MEDICINE

## 2018-03-19 PROCEDURE — 83036 HEMOGLOBIN GLYCOSYLATED A1C: CPT | Performed by: FAMILY MEDICINE

## 2018-03-19 PROCEDURE — 93971 EXTREMITY STUDY: CPT | Mod: RT

## 2018-03-19 PROCEDURE — 73560 X-RAY EXAM OF KNEE 1 OR 2: CPT | Mod: RT

## 2018-03-19 PROCEDURE — 82043 UR ALBUMIN QUANTITATIVE: CPT | Performed by: FAMILY MEDICINE

## 2018-03-19 RX ORDER — OXYCODONE HYDROCHLORIDE 5 MG/1
5 TABLET ORAL EVERY 4 HOURS PRN
Qty: 10 TABLET | Refills: 0 | Status: SHIPPED | OUTPATIENT
Start: 2018-03-19 | End: 2018-03-28

## 2018-03-19 RX ORDER — OXYCODONE HYDROCHLORIDE 5 MG/1
10 TABLET ORAL ONCE
Status: COMPLETED | OUTPATIENT
Start: 2018-03-19 | End: 2018-03-19

## 2018-03-19 RX ADMIN — OXYCODONE HYDROCHLORIDE 10 MG: 5 TABLET ORAL at 21:49

## 2018-03-19 ASSESSMENT — PAIN SCALES - GENERAL: PAINLEVEL: SEVERE PAIN (6)

## 2018-03-19 NOTE — ED AVS SNAPSHOT
[facility] Emergency Department    [address] [address]    Phone:  [phone]    Fax:  [phone]                                       Chepe Elkins   MRN: [id number]    Department:  [facility] Emergency Department   Date of Visit:  3/19/2018           After Visit Summary Signature Page     I have received my discharge instructions, and my questions have been answered. I have discussed any challenges I see with this plan with the nurse or doctor.    ..........................................................................................................................................  Patient/Patient Representative Signature      ..........................................................................................................................................  Patient Representative Print Name and Relationship to Patient    ..................................................               ................................................  Date                                            Time    ..........................................................................................................................................  Reviewed by Signature/Title    ...................................................              ..............................................  Date                                                            Time

## 2018-03-19 NOTE — ED AVS SNAPSHOT
[facility] Emergency Department    [address] [address]    Phone:  [phone]    Fax:  [phone]                                       Chepe Elkins   MRN: [id number]    Department:  [facility] Emergency Department   Date of Visit:  3/19/2018           Patient Information     Date Of Birth          1965        Your diagnoses for this visit were:     Acute deep vein thrombosis (DVT) of popliteal vein of right lower extremity (H)        You were seen by Jude Dow MD.        Discharge Instructions       Use acetaminophen as needed for pain.  Apply warm packs to the area to help with clot breakdown and swelling.  Take the rivaroxaban as prescribed for the next 21 days and then your dosing will change to once a day after that.  Follow-up with your primary care doctor in 1-2 weeks for a recheck.    24 Hour Appointment Hotline       To make an appointment at any [facility] clinic, call [phone] ([phone]). If you don't have a family doctor or clinic, we will help you find one. [facility] clinics are conveniently located to serve the needs of you and your family.             Review of your medicines      START taking        Dose / Directions Last dose taken    rivaroxaban ANTICOAGULANT 15 MG Tabs tablet   Commonly known as:  XARELTO   Dose:  15 mg   Quantity:  42 tablet        Take 1 tablet (15 mg) by mouth 2 times daily (with meals) for 21 days   Refills:  0          Our records show that you are taking the medicines listed below. If these are incorrect, please call your family doctor or clinic.        Dose / Directions Last dose taken    blood glucose lancets standard   Commonly known as:  no brand specified   Quantity:  100 each        Use to test blood sugar 2-3 times daily or as directed.   Refills:  3        blood glucose monitoring meter device kit   Commonly known as:  no brand specified   Quantity:  1 kit        Use to test blood sugar 2-3 times daily or  as directed.   Refills:  0        blood glucose monitoring test strip   Commonly known as:  no brand specified   Quantity:  100 strip        Use to test blood sugar 2-3 times daily or as directed.   Refills:  11        IBUPROFEN PO   Dose:  800 mg        Take 800 mg by mouth 2 times daily as needed for moderate pain   Refills:  0        metFORMIN 1000 MG tablet   Commonly known as:  GLUCOPHAGE   Dose:  1000 mg   Quantity:  60 tablet        Take 1 tablet (1,000 mg) by mouth 2 times daily (with meals)   Refills:  11        MULTI VITAMIN MENS PO   Dose:  1 tablet        Take 1 tablet by mouth daily   Refills:  0        sildenafil 20 MG tablet   Commonly known as:  REVATIO   Quantity:  50 tablet        Sidenafil (Viagra) comes in 20mg strength pills. Take as many pills as needed up to maximum of 5 pills at a time prior to intercourse.   Refills:  11                Prescriptions were sent or printed at these locations (1 Prescription)                   Other Prescriptions                Printed at Department/Unit printer (1 of 1)         rivaroxaban ANTICOAGULANT (XARELTO) 15 MG TABS tablet                Procedures and tests performed during your visit     Knee XR, 2 view, right    US Lower Extremity Venous Duplex Right      Orders Needing Specimen Collection     None      Pending Results     Date and Time Order Name Status Description    3/19/2018 1946 TSH In process     3/19/2018 1946 ALBUMIN RANDOM URINE QUANTITATIVE In process             Pending Culture Results     Date and Time Order Name Status Description    3/19/2018 1946 ALBUMIN RANDOM URINE QUANTITATIVE In process             Pending Results Instructions     If you had any lab results that were not finalized at the time of your Discharge, you can call the ED Lab Result RN at [phone]. You will be contacted by this team for any positive Lab results or changes in treatment. The nurses are available 7 days a week from 10A to 6:30P.  You can leave a message  24 hours per day and they will return your call.        Test Results From Your Hospital Stay        3/19/2018 10:33 PM      Narrative     RIGHT KNEE ONE-TWO VIEWS  3/19/2018 10:23 PM     HISTORY: Pain and swelling of knee.    COMPARISON: 2/26/2018.        Impression     IMPRESSION: Moderate medial joint space narrowing and mild  patellofemoral degenerative marginal bony spurring. This is unchanged  since prior. Small joint effusion. No evidence of fracture.    MICHELLE CALHOUN MD         3/19/2018 10:19 PM      Narrative     VENOUS ULTRASOUND RIGHT LEG  3/19/2018 10:12 PM     HISTORY: Pain and swelling.    COMPARISON: None.    TECHNIQUE: Examination of the deep veins was completed with graded  compression and color flow Doppler with spectral wave form analysis.    FINDINGS: There is hypoechoic thrombus within the right popliteal vein  which is partially compressible. This represents nonocclusive  thrombus. The femoral vein and posterior tibial vein are fully  compressible without evidence of thrombus. No thrombus is seen within  the common femoral vein, peroneal vein, or greater saphenous vein.        Impression     IMPRESSION: Nonocclusive deep venous thrombosis in the right popliteal  vein.    Results discussed with Jude Dow at 10:15 PM on 3/19/2018.    MICHELLE CALHOUN MD                Thank you for choosing [facility]       Thank you for choosing [facility] for your care. Our goal is always to provide you with excellent care. Hearing back from our patients is one way we can continue to improve our services. Please take a few minutes to complete the written survey that you may receive in the mail after you visit with us. Thank you!        [employer] Information     [email or web address] gives you secure access to your electronic health record. If you see a primary care provider, you can also send messages to your care team and make appointments. If you have questions, please call your primary care clinic.  If you do not have a  primary care provider, please call [phone] and they will assist you.        Care EveryWhere ID     This is your Care EveryWhere ID. This could be used by other organizations to access your [facility] medical records  [id number]        Equal Access to Services     DAVID COTTO : Cody Vo, adelaida amor, karan kaalnathan remy, laine hummel. So [facility] [phone].    ATENCIÓN: Si habla español, tiene a christie disposición servicios gratuitos de asistencia lingüística. Llame al [phone].    We comply with applicable federal civil rights laws and [place] laws. We do not discriminate on the basis of race, color, national origin, age, disability, sex, sexual orientation, or gender identity.            After Visit Summary       This is your record. Keep this with you and show to your community pharmacist(s) and doctor(s) at your next visit.

## 2018-03-19 NOTE — MR AVS SNAPSHOT
After Visit Summary   3/19/2018    Chepe Elkins    MRN: [id number]           Patient Information     Date Of Birth          1965        Visit Information        Provider Department      3/19/2018 6:00 PM Bean Eduardo MD [facility]        Today's Diagnoses     Type 2 diabetes mellitus without complication, without long-term current use of insulin (H)    -  1    Morbid obesity (H)        Leg swelling        Chronic pain of right knee          Care Instructions    ASSESSMENT/PLAN:     (E11.9) Type 2 diabetes mellitus without complication, without long-term current use of insulin (H)  (primary encounter diagnosis)  Comment: not well controlled   Plan: Hemoglobin A1c, Albumin Random Urine         Quantitative with Creat Ratio, TSH, FOOT EXAM,         metFORMIN (GLUCOPHAGE) 1000 MG tablet, DIABETES        EDUCATOR REFERRAL          Check blood tests for thyroid and Hgb A1C.   Urine test for protein in urine.   Schedule an appointment with diabetic educator for help with getting diabetes mellitus under control/.   Increase metformin to 1000mg twice daily.    We may need to add a second medication to get diabetes mellitus under control.   Recheck blood glucose readings with me or diabetic educator within 2 weeks.   Keep working in diet and weight loss.     (E66.01) Morbid obesity (H)    (M79.89) Leg swelling  Comment: R/O DVT.  Pain in posterior knee and distal thigh.  Swelling in right leg.   Plan: DUPLEX EXTREMITY VENOUS, LIMITED UNILATERAL          Leg venous doppler tonight to make sure there is no blood clot-DVT.  [facility] Emergency [facility] at [facility]   positive will need blood thinners.    If negative, we will work on improving the knee pain.     (M25.561,  G89.29) Chronic pain of right knee  Comment: osteoarthritis on xrays done last night.   Plan:   You are taking too much ibuprofen.  This can cause   Ibuprofen 200mg OTC may be taken up to 4 pills 4  times a day to the maximum of 3200mg or 16 pills daily as needed for pain.   Naproxen (Aleve) OTC may be taken up to 2 pills 2 times a day to the maximum of 4 pills daily as needed for pain.   Aspirin may be taken up to 2-3 pills every 4 yours as needed for pain.   Take only one type of these at a time and take with food.   Acetaminophen (Tylenol) may be taken up to 4000mg daily which would be 350mg, 2 pills every 4 hours or 500mg (extra strength) 2 pills 4 times a day.  This could be taken with the antiinflammatory medications mentioned above.           Follow-ups after your visit        Additional Services     DIABETES EDUCATOR REFERRAL       DIABETES SELF MANAGEMENT TRAINING (DSMT)      Your provider has referred you to Diabetes Education: FMG: Diabetes Education - [facility] [phone]   [email or web address]     If an urgent visit is needed or A1C is above 12, Care Team to call the Diabetes  Education Team at [phone] or send an In Basket message to the Diabetes Education Pool (P DIAB ED-PATIENT CARE).    A  will call you to make your appointment. If it has been more than 3 business days since your referral was placed, please call the above phone number to schedule.    Type of training and number of hours: New Diagnosis: Initial group DSMT - 10 hours.      Medicare covers: 10 hours of initial DSMT in 12 month period from the time of first visit, plus 2 hours of follow-up DSMT annually, and additional hours as requested for insulin training.    Diabetes Type: Type 2 - On Oral Medication             Diabetes Co-Morbidities: none               A1C Goal:  <7.0       A1C is: No results found for: A1C    Diabetes Education Topics: Comprehensive Knowledge Assessment and Instruction    Special Educational Needs Requiring Individual DSMT: None       MEDICAL NUTRITION THERAPY (MNT) for Diabetes    Medical Nutrition Therapy with a Registered  Dietitian can be provided in coordination with Diabetes Self-Management Training to assist in achieving optimal diabetes management.    MNT Type and Hours: New diagnosis: Initial MNT - 3 hours                       Medicare will cover: 3 hours initial MNT in 12 month period after first visit, plus 2 hours of follow-up MNT annually    Please be aware that coverage of these services is subject to the terms and limitations of your health insurance plan.  Call member services at your health plan to determine Diabetes Self-Management Training (Codes  &amp; ) and Medical Nutrition Therapy (Codes 63833 & 87351) benefits and ask which blood glucose monitor brands are covered by your plan.  Please bring the following with you to your appointment:    (1)  List of current medications   (2)  List of Blood Glucose Monitor brands that are covered by your insurance plan  (3)  Blood Glucose Monitor and log book  (4)   Food records for the 3 days prior to your visit    The Certified Diabetes Educator may make diabetes medication adjustments per the CDE Protocol and Collaborative Practice Agreement.                  Future tests that were ordered for you today     Open Future Orders        Priority Expected Expires Ordered    DUPLEX EXTREMITY VENOUS, LIMITED UNILATERAL Routine  5/3/2018 3/19/2018            Who to contact     If you have questions or need follow up information about today's clinic visit or your schedule please contact [facility] directly at [phone].  Normal or non-critical lab and imaging results will be communicated to you by MyChart, letter or phone within 4 business days after the clinic has received the results. If you do not hear from us within 7 days, please contact the clinic through MyChart or phone. If you have a critical or abnormal lab result, we will notify you by phone as soon as possible.  Submit refill requests through [email or web address] or call your pharmacy and they will  "forward the refill request to us. Please allow 3 business days for your refill to be completed.          Additional Information About Your Visit        Um[employer]el[employer] Information     [employer] gives you secure access to your electronic health record. If you see a primary care provider, you can also send messages to your care team and make appointments. If you have questions, please call your primary care clinic.  If you do not have a primary care provider, please call [phone] and they will assist you.        Care EveryWhere ID     This is your Care EveryWhere ID. This could be used by other organizations to access your [facility] medical records  [id number]        Your Vitals Were     Pulse Temperature Respirations Height BMI (Body Mass Index)       74 97.9  F (36.6  C) (Tympanic) 18 6' 0.25\" (1.835 m) 43.23 kg/m2        Blood Pressure from Last 3 Encounters:   03/19/18 132/78   02/26/18 138/74    Weight from Last 3 Encounters:   03/19/18 (!) 321 lb (145.6 kg)   02/26/18 (!) 318 lb (144.2 kg)              We Performed the Following     Albumin Random Urine Quantitative with Creat Ratio     DIABETES EDUCATOR REFERRAL     FOOT EXAM     Hemoglobin A1c     TSH          Today's Medication Changes          These changes are accurate as of 3/19/18  7:59 PM.  If you have any questions, ask your nurse or doctor.               These medicines have changed or have updated prescriptions.        Dose/Directions    metFORMIN 1000 MG tablet   Commonly known as:  GLUCOPHAGE   This may have changed:    - medication strength  - how much to take   Used for:  Type 2 diabetes mellitus without complication, without long-term current use of insulin (H)        Dose:  1000 mg   Take 1 tablet (1,000 mg) by mouth 2 times daily (with meals)   Quantity:  60 tablet   Refills:  11            Where to get your medicines      These medications were sent to [facility] [address] " [facility] [address]     Phone:  [phone]     metFORMIN 1000 MG tablet                Primary Care Provider Office Phone # Fax #    Bean Eduardo [phone] [address] [address]        Equal Access to Services     DAVID COTTO : Hadii aad ku hadbarretto Soomaali, waaxda luqadaha, qaybta kaalmada adeegyada, waxay idiin haychanteln adeemanuel carreon hamilton hummel. So [facility] [phone].    ATENCIÓN: Si habla español, tiene a christie disposición servicios gratuitos de asistencia lingüística. Llame al [phone].    We comply with applicable federal civil rights laws and [place] laws. We do not discriminate on the basis of race, color, national origin, age, disability, sex, sexual orientation, or gender identity.            Thank you!     Thank you for choosing [facility]  for your care. Our goal is always to provide you with excellent care. Hearing back from our patients is one way we can continue to improve our services. Please take a few minutes to complete the written survey that you may receive in the mail after your visit with us. Thank you!             Your Updated Medication List - Protect others around you: Learn how to safely use, store and throw away your medicines at www.disposemymeds.org.          This list is accurate as of 3/19/18  7:59 PM.  Always use your most recent med list.                   Brand Name Dispense Instructions for use Diagnosis    blood glucose lancets standard    no brand specified    100 each    Use to test blood sugar 2-3 times daily or as directed.    Diabetes mellitus, type 2 (H)       blood glucose monitoring meter device kit    no brand specified    1 kit    Use to test blood sugar 2-3 times daily or as directed.    Diabetes mellitus, type 2 (H)       blood glucose monitoring test strip    no brand specified    100 strip    Use to test blood sugar 2-3 times daily or as directed.    Diabetes mellitus, type 2 (H)       IBUPROFEN PO       Take 800 mg by mouth 2 times daily as needed for moderate pain        metFORMIN 1000 MG tablet    GLUCOPHAGE    60 tablet    Take 1 tablet (1,000 mg) by mouth 2 times daily (with meals)    Type 2 diabetes mellitus without complication, without long-term current use of insulin (H)       MULTI VITAMIN MENS PO      Take 1 tablet by mouth daily        sildenafil 20 MG tablet    REVATIO    50 tablet    Sidenafil (Viagra) comes in 20mg strength pills. Take as many pills as needed up to maximum of 5 pills at a time prior to intercourse.    Erectile dysfunction, unspecified erectile dysfunction type

## 2018-03-19 NOTE — NURSING NOTE
"Chief Complaint   Patient presents with     Diabetes     Blood sugars are all over the place.      Knee Pain     Follow up Right knee pain- pain is worse, slipped and fell a long time ago- was supposed to tell Dr how it is doing next time I see him       Initial /78 (BP Location: Right arm, Patient Position: Sitting, Cuff Size: Adult Large)  Pulse 74  Temp 97.9  F (36.6  C) (Tympanic)  Resp 18  Ht 6' 0.25\" (1.835 m)  Wt (!) 321 lb (145.6 kg)  BMI 43.23 kg/m2 Estimated body mass index is 43.23 kg/(m^2) as calculated from the following:    Height as of this encounter: 6' 0.25\" (1.835 m).    Weight as of this encounter: 321 lb (145.6 kg).  Medication Reconciliation: complete    "

## 2018-03-19 NOTE — PROGRESS NOTES
"  SUBJECTIVE:   Chepe Elkins is a 52 year old male who presents to clinic today for the following health issues:    Diabetes Follow-up  Patient is checking blood sugars: three times daily.   Blood sugar testing frequency justification: newly diagnosed end of February 2018  Results are as follows:              postprandial before breakfast - 270-360         lunchtime - 255-378         bedtime - 310-470    Diabetic concerns: None and blood sugar frequently over 200     Symptoms of hypoglycemia (low blood sugar): none     Paresthesias (numbness or burning in feet) or sores: No     Date of last diabetic eye exam: None- new diagnosis    BP Readings from Last 2 Encounters:   03/19/18 132/78   02/26/18 138/74     LDL Cholesterol Calculated (mg/dL)   Date Value   02/27/2018 93       Amount of exercise or physical activity: limited- due to knee pain (RIGHT)    Problems taking medications regularly: No    Medication side effects: none    Diet: carbohydrate counting    Newly diagnosed type 2 diabetic with random blood glucose 325. Started on metformin 500 mg twice a day without difficulty. Blood sugars remain elevated as above. Occasionally feels \"fidgety\" with blood sugars in 400s. No other symptoms or side effects from metformin. He has been watching his diet, eating more vegetables, limiting portion sizes, and cutting out candy completely. His activity level has been low as he has not been able to exercise with his right knee pain. He has not yet met with the diabetic educator.     RIGHT KNEE problem/pain    Duration: got better and now the past several weeks pain is increasing    Description  Location: Right knee    Intensity:  severe    Accompanying signs and symptoms: swelling    History  Previous similar problem: YES- fell on ice about 2 years ago and it got better  Previous evaluation:  x-ray    Precipitating or alleviating factors:  Trauma or overuse: YES  Aggravating factors include: everything    Therapies tried and " "outcome: Nothing is helping- Taking Ibuprofen 200mg 8 tablets at a time every 3-4 hours    2-month history of right knee pain after walking through deep snow has worsened significantly in the past 2 weeks. No additional injury or overuse. Now with increased swelling around the right knee into the proximal calf and distal thigh. Pain is severe, now involving entire right knee joint and radiates to popliteal fossa, worse with positional changes and weight bearing. Pain has improved with rest and ice. He has been taking large quantities of ibuprofen as above. XR showed moderate medial joint space narrowing with bony spurring.    Problem list and histories reviewed & adjusted, as indicated.  Additional history: as documented  Labs reviewed in EPIC    Reviewed and updated as needed this visit by clinical staff  Allergies  Meds       Reviewed and updated as needed this visit by Provider       ROS:  Constitutional: No fever, chills  Eyes: No change in vision  ENT: No sore throat, drainage  Respiratory: No shortness of breath, cough  Cardiovascular: No chest pain, palpitations  Gastrointestinal: No nausea, diarrhea, constipation, abdominal pain  Genitourinary: No urinary urgency or change in frequenc  Musculoskeletal: Right knee pain and swelling.  Allergic: No known allergies  Neurologic: No headache, dizziness, focal weakness, sensory loss, paresthesias, falls  Psychiatric: No changes in sleep, mood    OBJECTIVE:     /78 (BP Location: Right arm, Patient Position: Sitting, Cuff Size: Adult Large)  Pulse 74  Temp 97.9  F (36.6  C) (Tympanic)  Resp 18  Ht 6' 0.25\" (1.835 m)  Wt (!) 321 lb (145.6 kg)  BMI 43.23 kg/m2  Body mass index is 43.23 kg/(m^2).     Constitutional: obese male, in mild distress while weight bearing  Eyes: PERRL bilaterally, anicteric sclera, no conjunctival injection  ENT: moist mucous membranes, non-erythematous oropharynx, no lymphadenopathy  Respiratory: no respiratory distress, lung " sounds clear to auscultation with equal air flow bilaterally, no wheezes, crackles, or rhonchi  Cardiovascular: regular rate and rhythm, normal S1 and S2, no rubs or murmurs, peripheral pulses strong and equal bilaterally, trace right lower extremity pitting edema, extremities well-perfused  Skin: warm and dry, no erythema  Musculoskeletal:   - Right lower extremity: swelling surrounding knee extending to proximal calf and distal thigh, with mild joint effusion, pain to knee flexion and full extension, mild pain to palpation in popliteal fossa  - Right proximal calf circumference 48 cm, left proximal calf circumference 46 cm (from 30 cm from inferior medial malleolus).  Neuro: alert and oriented to person, time, and place  Psych: appropriate mood and affect, cooperative with exam    Diagnostic Test Results:  Results for orders placed or performed in visit on 03/19/18 (from the past 24 hour(s))   Hemoglobin A1c   Result Value Ref Range    Hemoglobin A1C 12.5 (H) 4.3 - 6.0 %       ASSESSMENT/PLAN:     (E11.9) Type 2 diabetes mellitus without complication, without long-term current use of insulin (H)  (primary encounter diagnosis)  Comment: not well controlled   Plan: Hemoglobin A1c, Albumin Random Urine         Quantitative with Creat Ratio, TSH, FOOT EXAM,         metFORMIN (GLUCOPHAGE) 1000 MG tablet, DIABETES        EDUCATOR REFERRAL          Check blood tests for thyroid and Hgb A1C.   Urine test for protein in urine.   Schedule an appointment with diabetic educator for help with getting diabetes mellitus under control/.   Increase metformin to 1000mg twice daily.    We may need to add a second medication to get diabetes mellitus under control.   Recheck blood glucose readings with me or diabetic educator within 2 weeks.   Keep working in diet and weight loss.     (E66.01) Morbid obesity (H)    (M79.89) Leg swelling  Comment: R/O DVT.  Pain in posterior knee and distal thigh.  Swelling in right leg.   Plan: DUPLEX  EXTREMITY VENOUS, LIMITED UNILATERAL          Leg venous doppler tonight to make sure there is no blood clot-DVT.  Go to the Emergency department at [facility]   positive will need blood thinners.    If negative, we will work on improving the knee pain.     (M25.561,  G89.29) Chronic pain of right knee  Comment: osteoarthritis on xrays done last night.   Plan:   You are taking too much ibuprofen.  This can cause   Ibuprofen 200mg OTC may be taken up to 4 pills 4 times a day to the maximum of 3200mg or 16 pills daily as needed for pain.   Naproxen (Aleve) OTC may be taken up to 2 pills 2 times a day to the maximum of 4 pills daily as needed for pain.   Aspirin may be taken up to 2-3 pills every 4 yours as needed for pain.   Take only one type of these at a time and take with food.   Acetaminophen (Tylenol) may be taken up to 4000mg daily which would be 350mg, 2 pills every 4 hours or 500mg (extra strength) 2 pills 4 times a day.  This could be taken with the antiinflammatory medications mentioned above.      Jude Robert, MS3    I did see and examine patient with Jude Robert today.   ROSIE ORTEGA MD   [facility]

## 2018-03-20 ENCOUNTER — TELEPHONE (OUTPATIENT)
Dept: EDUCATION SERVICES | Facility: CLINIC | Age: 53
End: 2018-03-20

## 2018-03-20 LAB
CREAT UR-MCNC: 79 MG/DL
MICROALBUMIN UR-MCNC: 19 MG/L
MICROALBUMIN/CREAT UR: 24.4 MG/G CR (ref 0–17)

## 2018-03-20 NOTE — PROGRESS NOTES
Alejo Mo,   I see from the Emergency department notes that you did have a blood clot.    Taking the blood thinner will help prevent worsening clot and potential clots to lungs.     Your labs are back.   TSH is normal indicating that thyroid function is normal.   Urine tests show positive MAC (microalbumin/creatinine ratio) meaning protein leaking from the kidneys into the urine. This may be a sign of some kidney damage from the diabetes mellitus.    Hgb A1C is a 3 month average of blood glucose readings indicating the diabetes mellitus is not well controlled which we knew from your glucometer readings.  This should improve with changes in your medication for the diabetes mellitus.     PLAN: See me in the next week.    If you have any problems with the blood thinner-let me know right away.    ROSIE ORTEGA MD

## 2018-03-20 NOTE — ED NOTES
Bed: IN01  Expected date: 3/19/18  Expected time:   Means of arrival:   Comments:  Chepe Elkins - 53 yo male with unilateral LE swelling and tenderness.    Bean Eduardo

## 2018-03-20 NOTE — PATIENT INSTRUCTIONS
ASSESSMENT/PLAN:     (E11.9) Type 2 diabetes mellitus without complication, without long-term current use of insulin (H)  (primary encounter diagnosis)  Comment: not well controlled   Plan: Hemoglobin A1c, Albumin Random Urine         Quantitative with Creat Ratio, TSH, FOOT EXAM,         metFORMIN (GLUCOPHAGE) 1000 MG tablet, DIABETES        EDUCATOR REFERRAL          Check blood tests for thyroid and Hgb A1C.   Urine test for protein in urine.   Schedule an appointment with diabetic educator for help with getting diabetes mellitus under control/.   Increase metformin to 1000mg twice daily.    We may need to add a second medication to get diabetes mellitus under control.   Recheck blood glucose readings with me or diabetic educator within 2 weeks.   Keep working in diet and weight loss.     (E66.01) Morbid obesity (H)    (M79.89) Leg swelling  Comment: R/O DVT.  Pain in posterior knee and distal thigh.  Swelling in right leg.   Plan: DUPLEX EXTREMITY VENOUS, LIMITED UNILATERAL          Leg venous doppler tonight to make sure there is no blood clot-DVT.  Go to the Emergency department at [facility]   positive will need blood thinners.    If negative, we will work on improving the knee pain.     (M25.561,  G89.29) Chronic pain of right knee  Comment: osteoarthritis on xrays done last night.   Plan:   You are taking too much ibuprofen.  This can cause   Ibuprofen 200mg OTC may be taken up to 4 pills 4 times a day to the maximum of 3200mg or 16 pills daily as needed for pain.   Naproxen (Aleve) OTC may be taken up to 2 pills 2 times a day to the maximum of 4 pills daily as needed for pain.   Aspirin may be taken up to 2-3 pills every 4 yours as needed for pain.   Take only one type of these at a time and take with food.   Acetaminophen (Tylenol) may be taken up to 4000mg daily which would be 350mg, 2 pills every 4 hours or 500mg (extra strength) 2 pills 4 times a day.  This could be taken with the  antiinflammatory medications mentioned above.

## 2018-03-20 NOTE — TELEPHONE ENCOUNTER
Diabetes Education Scheduling Outreach #1:    Call to patient to schedule. Left message with phone number to call to schedule.    Plan for 2nd outreach attempt within 1 week.    Mercy Dave  [facility]  Diabetes and Nutrition Scheduling

## 2018-03-20 NOTE — ED PROVIDER NOTES
History     Chief Complaint   Patient presents with     Knee Pain     Pt c/o right knee pain     HPI  Chepe Elkins is a 52 year old male who presents for pain and swelling of his right thigh.  The patient reports that he has had increased pain and swelling of his right knee and thigh for the past 2 weeks, no known recent injury.  He has hurt this knee before about 2 years ago.  Pain is aching, rated as moderate, he has taken ibuprofen for the pain with minimal improvement.  He was in clinic earlier today and told him to come to the emergency department for an ultrasound of his leg to evaluate for DVT.  No fevers, body aches, rash, nausea, vomiting    Problem List:    Patient Active Problem List    Diagnosis Date Noted     Type 2 diabetes mellitus without complication, without long-term current use of insulin (H) 03/19/2018     Priority: Medium     Morbid obesity (H) 03/19/2018     Priority: Medium        Past Medical History:    No past medical history on file.    Past Surgical History:    No past surgical history on file.    Family History:    Family History   Problem Relation Age of Onset     DIABETES Mother      Prostate Cancer No family hx of      Colon Cancer No family hx of        Social History:  Marital Status:   [2]  Social History   Substance Use Topics     Smoking status: Former Smoker     Packs/day: 1.00     Years: 25.00     Types: Cigarettes     Quit date: 2/26/2010     Smokeless tobacco: Never Used      Comment: started at age 19     Alcohol use Yes      Comment: rarely        Medications:      rivaroxaban ANTICOAGULANT (XARELTO) 15 MG TABS tablet   oxyCODONE IR (ROXICODONE) 5 MG tablet   metFORMIN (GLUCOPHAGE) 1000 MG tablet   blood glucose monitoring (NO BRAND SPECIFIED) meter device kit   blood glucose monitoring (NO BRAND SPECIFIED) test strip   blood glucose (NO BRAND SPECIFIED) lancets standard   sildenafil (REVATIO) 20 MG tablet   Multiple Vitamin (MULTI VITAMIN MENS PO)   IBUPROFEN PO  "        Review of Systems  A 4 point review of systems was performed. All pertinent positives and negatives were listed in the HPI and rest of ROS were otherwise negative.    Physical Exam   BP: (!) 169/96  Pulse: 84  Temp: 98  F (36.7  C)  Resp: 16  Height: 185.4 cm (6' 1\")  Weight: (!) 146.1 kg (322 lb)  SpO2: 100 %      Physical Exam   Constitutional: He is oriented to person, place, and time. He appears well-developed and well-nourished. No distress.   HENT:   Head: Normocephalic and atraumatic.   Eyes: No scleral icterus.   Neck: Normal range of motion. Neck supple.   Cardiovascular:   Pulses:       Dorsalis pedis pulses are 2+ on the right side        Posterior tibial pulses are 2+ on the right side   Musculoskeletal:   Right Knee: effusion present; no deformity, erythema or warmth appreciated; no tenderness over patella, joint line, or femoral condyles; full ROM   Neurological: He is alert and oriented to person, place, and time.   Skin: Skin is warm and dry. No rash noted. He is not diaphoretic. No erythema. No pallor.       ED Course     ED Course     Procedures               Critical Care time:  none               Results for orders placed or performed during the hospital encounter of 03/19/18 (from the past 24 hour(s))   US Lower Extremity Venous Duplex Right    Narrative    VENOUS ULTRASOUND RIGHT LEG  3/19/2018 10:12 PM     HISTORY: Pain and swelling.    COMPARISON: None.    TECHNIQUE: Examination of the deep veins was completed with graded  compression and color flow Doppler with spectral wave form analysis.    FINDINGS: There is hypoechoic thrombus within the right popliteal vein  which is partially compressible. This represents nonocclusive  thrombus. The femoral vein and posterior tibial vein are fully  compressible without evidence of thrombus. No thrombus is seen within  the common femoral vein, peroneal vein, or greater saphenous vein.      Impression    IMPRESSION: Nonocclusive deep venous " thrombosis in the right popliteal  vein.    Results discussed with Jude Dow at 10:15 PM on 3/19/2018.    MICHELLE CALHOUN MD   Knee XR, 2 view, right    Narrative    RIGHT KNEE ONE-TWO VIEWS  3/19/2018 10:23 PM     HISTORY: Pain and swelling of knee.    COMPARISON: 2/26/2018.      Impression    IMPRESSION: Moderate medial joint space narrowing and mild  patellofemoral degenerative marginal bony spurring. This is unchanged  since prior. Small joint effusion. No evidence of fracture.    MICHELLE CALHOUN MD       Medications   oxyCODONE IR (ROXICODONE) tablet 10 mg (10 mg Oral Given 3/19/18 2149)       Assessments & Plan (with Medical Decision Making)   52-year-old male presents for swelling and pain in the right knee and thigh.  Temperature is 36.7 C, blood pressure 169/96, heart rate 84, SPO2 100% on room air.  Ultrasound, images reviewed independently as well as radiology read reviewed, positive for DVT of the popliteal vein. The patient and I had a long conversation about the risks and benefits of different anticoagulant medications for treatment of DVT. This included warfarin/enoxaparin and rivaroxaban. We discussed the increased risk of hemorrhage including life threatening intracranial hemorrhage with both medications. We also discussed the need for frequent blood checks with warfarin verse the lack of need with rivaroxaban. The patient voiced understanding of these differences. All questions were answered. In the end, he decided to choose rivaroxaban for treatment. He is discharged to home with a prescription for rivaroxaban and oxycodone and instructions to return if he has chest pain, difficulty breathing, worsening symptoms, or other concerns. Otherwise follow up in clinic.    I have reviewed the nursing notes.    I have reviewed the findings, diagnosis, plan and need for follow up with the patient.       Discharge Medication List as of 3/19/2018 11:08 PM      START taking these medications    Details    rivaroxaban ANTICOAGULANT (XARELTO) 15 MG TABS tablet Take 1 tablet (15 mg) by mouth 2 times daily (with meals) for 21 days, Disp-42 tablet, R-0, Local Print             Final diagnoses:   Acute deep vein thrombosis (DVT) of popliteal vein of right lower extremity (H)       3/19/2018   [facility] EMERGENCY DEPARTMENT     Jude Dow MD  03/20/18 0217

## 2018-03-20 NOTE — ED NOTES
Pt back from US, reports pain is decreasing after taking oxycodone. Pt placed on monitor. Call light within reach.

## 2018-03-20 NOTE — ED NOTES
+right leg pain.  Pt was advised to be seen for US to r/o DVT.  Pt has hx of arthritis in LE.  Pain is present with ambulation, typically, but today pain has increased.  Pt describes pain as above knee, not below.  Pt took ibuprofen for pain at 1200.  Pt wife at bedside.

## 2018-03-20 NOTE — DISCHARGE INSTRUCTIONS
Use acetaminophen as needed for pain.  Apply warm packs to the area to help with clot breakdown and swelling.  Take the rivaroxaban as prescribed for the next 21 days and then your dosing will change to once a day after that.  Follow-up with your primary care doctor in 1-2 weeks for a recheck.

## 2018-03-23 ENCOUNTER — TELEPHONE (OUTPATIENT)
Dept: FAMILY MEDICINE | Facility: CLINIC | Age: 53
End: 2018-03-23

## 2018-03-23 NOTE — TELEPHONE ENCOUNTER
Chepe was diagnosed with a DVT 03/19/2018. Dr. Eduardo would like to know if Chepe is taking his blood thinner. He also want to have a follow up for his DVT next week. I called and left a message for Chepe to return our call.    Ara Hollis,[facility]

## 2018-03-26 ENCOUNTER — HOSPITAL ENCOUNTER (EMERGENCY)
Facility: CLINIC | Age: 53
Discharge: HOME | End: 2018-03-26
Attending: EMERGENCY MEDICINE | Admitting: EMERGENCY MEDICINE
Payer: COMMERCIAL

## 2018-03-26 ENCOUNTER — OFFICE VISIT (OUTPATIENT)
Dept: URGENT CARE | Facility: URGENT CARE | Age: 53
End: 2018-03-26
Payer: COMMERCIAL

## 2018-03-26 VITALS
BODY MASS INDEX: 42.61 KG/M2 | HEART RATE: 97 BPM | SYSTOLIC BLOOD PRESSURE: 140 MMHG | RESPIRATION RATE: 22 BRPM | WEIGHT: 315 LBS | OXYGEN SATURATION: 96 % | DIASTOLIC BLOOD PRESSURE: 87 MMHG | TEMPERATURE: 97.2 F

## 2018-03-26 VITALS
HEART RATE: 78 BPM | RESPIRATION RATE: 18 BRPM | SYSTOLIC BLOOD PRESSURE: 162 MMHG | DIASTOLIC BLOOD PRESSURE: 88 MMHG | OXYGEN SATURATION: 95 % | TEMPERATURE: 97.3 F

## 2018-03-26 DIAGNOSIS — M17.11 PRIMARY OSTEOARTHRITIS OF RIGHT KNEE: ICD-10-CM

## 2018-03-26 DIAGNOSIS — I82.401 ACUTE DEEP VEIN THROMBOSIS (DVT) OF RIGHT LOWER EXTREMITY, UNSPECIFIED VEIN (H): ICD-10-CM

## 2018-03-26 DIAGNOSIS — I82.4Y1 ACUTE DEEP VEIN THROMBOSIS (DVT) OF PROXIMAL VEIN OF RIGHT LOWER EXTREMITY (H): Primary | ICD-10-CM

## 2018-03-26 DIAGNOSIS — E66.01 MORBID OBESITY (H): ICD-10-CM

## 2018-03-26 PROCEDURE — 99284 EMERGENCY DEPT VISIT MOD MDM: CPT | Mod: Z6 | Performed by: EMERGENCY MEDICINE

## 2018-03-26 PROCEDURE — 99282 EMERGENCY DEPT VISIT SF MDM: CPT | Performed by: EMERGENCY MEDICINE

## 2018-03-26 RX ORDER — HYDROCODONE BITARTRATE AND ACETAMINOPHEN 5; 325 MG/1; MG/1
1 TABLET ORAL EVERY 6 HOURS PRN
Qty: 20 TABLET | Refills: 0 | Status: SHIPPED | OUTPATIENT
Start: 2018-03-26 | End: 2018-03-28

## 2018-03-26 NOTE — ED AVS SNAPSHOT
[facility] Emergency Department    [address] [address]    Phone:  [phone]    Fax:  [phone]                                       Chepe Elkins   MRN: [id number]    Department:  [facility] Emergency Department   Date of Visit:  3/26/2018           After Visit Summary Signature Page     I have received my discharge instructions, and my questions have been answered. I have discussed any challenges I see with this plan with the nurse or doctor.    ..........................................................................................................................................  Patient/Patient Representative Signature      ..........................................................................................................................................  Patient Representative Print Name and Relationship to Patient    ..................................................               ................................................  Date                                            Time    ..........................................................................................................................................  Reviewed by Signature/Title    ...................................................              ..............................................  Date                                                            Time

## 2018-03-26 NOTE — MR AVS SNAPSHOT
After Visit Summary   3/26/2018    Chepe Elkins    MRN: [id number]           Patient Information     Date Of Birth          1965        Visit Information        Provider Department      3/26/2018 5:55 PM Nataly Pacheco APRN CNP [facility]        Today's Diagnoses     Acute deep vein thrombosis (DVT) of proximal vein of right lower extremity (H)    -  1       Follow-ups after your visit        Your next 10 appointments already scheduled     Mar 28, 2018  4:00 PM CDT   Tammy Bridges with Bean Eduardo MD   [facility]    [address] [place] [id number]   [phone]              Who to contact     If you have questions or need follow up information about today's clinic visit or your schedule please contact [facility] directly at [phone].  Normal or non-critical lab and imaging results will be communicated to you by MyChart, letter or phone within 4 business days after the clinic has received the results. If you do not hear from us within 7 days, please contact the clinic through [employer] or phone. If you have a critical or abnormal lab result, we will notify you by phone as soon as possible.  Submit refill requests through [employer] or call your pharmacy and they will forward the refill request to us. Please allow 3 business days for your refill to be completed.          Additional Information About Your Visit        MyChart Information     [employer] gives you secure access to your electronic health record. If you see a primary care provider, you can also send messages to your care team and make appointments. If you have questions, please call your primary care clinic.  If you do not have a primary care provider, please call [phone] and they will assist you.        Care EveryWhere ID     This is your Care EveryWhere ID. This could be used by other  organizations to access your [place] medical records  [id number]        Your Vitals Were     Pulse Temperature Respirations Pulse Oximetry BMI (Body Mass Index)       97 97.2  F (36.2  C) (Tympanic) 22 96% 42.61 kg/m2        Blood Pressure from Last 3 Encounters:   03/26/18 140/87   03/19/18 136/82   03/19/18 132/78    Weight from Last 3 Encounters:   03/26/18 323 lb (146.5 kg)   03/19/18 (!) 322 lb (146.1 kg)   03/19/18 (!) 321 lb (145.6 kg)              Today, you had the following     No orders found for display       Primary Care Provider Office Phone # Fax #    Bean Eduardo [phone] [address] [place] [address]        Equal Access to Services     DAVID COTTO : Cody ortiz Soeliza, waaxda luqadaha, qaybta kaalmada sandrita, laine villasenor . So [facility] [phone].    ATENCIÓN: Si habla español, tiene a christie disposición servicios gratuitos de asistencia lingüística. Llame al [phone].    We comply with applicable federal civil rights laws and [place] laws. We do not discriminate on the basis of race, color, national origin, age, disability, sex, sexual orientation, or gender identity.            Thank you!     Thank you for choosing [facility]  for your care. Our goal is always to provide you with excellent care. Hearing back from our patients is one way we can continue to improve our services. Please take a few minutes to complete the written survey that you may receive in the mail after your visit with us. Thank you!             Your Updated Medication List - Protect others around you: Learn how to safely use, store and throw away your medicines at www.disposemymeds.org.          This list is accurate as of 3/26/18  6:13 PM.  Always use your most recent med list.                   Brand Name Dispense Instructions for use Diagnosis    blood glucose lancets standard    no brand specified    100 each     Use to test blood sugar 2-3 times daily or as directed.    Diabetes mellitus, type 2 (H)       blood glucose monitoring meter device kit    no brand specified    1 kit    Use to test blood sugar 2-3 times daily or as directed.    Diabetes mellitus, type 2 (H)       blood glucose monitoring test strip    no brand specified    100 strip    Use to test blood sugar 2-3 times daily or as directed.    Diabetes mellitus, type 2 (H)       IBUPROFEN PO      Take 800 mg by mouth 2 times daily as needed for moderate pain        metFORMIN 1000 MG tablet    GLUCOPHAGE    60 tablet    Take 1 tablet (1,000 mg) by mouth 2 times daily (with meals)    Type 2 diabetes mellitus without complication, without long-term current use of insulin (H)       MULTI VITAMIN MENS PO      Take 1 tablet by mouth daily        oxyCODONE IR 5 MG tablet    ROXICODONE    10 tablet    Take 1 tablet (5 mg) by mouth every 4 hours as needed for pain        rivaroxaban ANTICOAGULANT 15 MG Tabs tablet    XARELTO    42 tablet    Take 1 tablet (15 mg) by mouth 2 times daily (with meals) for 21 days        sildenafil 20 MG tablet    REVATIO    50 tablet    Sidenafil (Viagra) comes in 20mg strength pills. Take as many pills as needed up to maximum of 5 pills at a time prior to intercourse.    Erectile dysfunction, unspecified erectile dysfunction type

## 2018-03-26 NOTE — ED AVS SNAPSHOT
[facility] Emergency Department    [address] [facility] [address]    Phone:  [phone]    Fax:  [phone]                                       Chepe Elkins   MRN: [id number]    Department:  [facility] Emergency Department   Date of Visit:  3/26/2018           Patient Information     Date Of Birth          1965        Your diagnoses for this visit were:     Acute deep vein thrombosis (DVT) of right lower extremity, unspecified vein (H)     Morbid obesity (H)     Primary osteoarthritis of right knee        You were seen by Manjeet Vale DO.        Discharge Instructions       Elevate extremity-try to limit being upright and ambulating on extremities as much as she can  Apply moist heat to the backside of the right knee-30 minutes 3 times daily  Wear compression stockings  Continue with Xarelto  Consider follow-up with sports medicine provider for ongoing knee pain to address the arthritic component to the knee pain  Return to the ED for shortness of breath, chest pain-these can be signs for pulmonary embolism(blood clot in lung)      Your next 10 appointments already scheduled     Mar 28, 2018  4:00 PM ELVIET   Tammy Brigdes with eBan Eduardo MD   [facility] ([facility])    [address] [place] [address] [phone]              24 Hour Appointment Hotline       To make an appointment at any [facility], call [phone] ([phone]). If you don't have a family doctor or clinic, we will help you find one. [facility] are conveniently located to serve the needs of you and your family.          ED Discharge Orders     Compression stockings                    Review of your medicines      START taking        Dose / Directions Last dose taken    HYDROcodone-acetaminophen 5-325 MG per tablet   Commonly known as:  NORCO   Dose:  1 tablet   Quantity:  20 tablet        Take 1 tablet by mouth every 6 hours as  needed for moderate to severe pain   Refills:  0          Our records show that you are taking the medicines listed below. If these are incorrect, please call your family doctor or clinic.        Dose / Directions Last dose taken    blood glucose lancets standard   Commonly known as:  no brand specified   Quantity:  100 each        Use to test blood sugar 2-3 times daily or as directed.   Refills:  3        blood glucose monitoring meter device kit   Commonly known as:  no brand specified   Quantity:  1 kit        Use to test blood sugar 2-3 times daily or as directed.   Refills:  0        blood glucose monitoring test strip   Commonly known as:  no brand specified   Quantity:  100 strip        Use to test blood sugar 2-3 times daily or as directed.   Refills:  11        IBUPROFEN PO   Dose:  800 mg        Take 800 mg by mouth 2 times daily as needed for moderate pain   Refills:  0        metFORMIN 1000 MG tablet   Commonly known as:  GLUCOPHAGE   Dose:  1000 mg   Quantity:  60 tablet        Take 1 tablet (1,000 mg) by mouth 2 times daily (with meals)   Refills:  11        MULTI VITAMIN MENS PO   Dose:  1 tablet        Take 1 tablet by mouth daily   Refills:  0        oxyCODONE IR 5 MG tablet   Commonly known as:  ROXICODONE   Dose:  5 mg   Quantity:  10 tablet        Take 1 tablet (5 mg) by mouth every 4 hours as needed for pain   Refills:  0        rivaroxaban ANTICOAGULANT 15 MG Tabs tablet   Commonly known as:  XARELTO   Dose:  15 mg   Quantity:  42 tablet        Take 1 tablet (15 mg) by mouth 2 times daily (with meals) for 21 days   Refills:  0        sildenafil 20 MG tablet   Commonly known as:  REVATIO   Quantity:  50 tablet        Sidenafil (Viagra) comes in 20mg strength pills. Take as many pills as needed up to maximum of 5 pills at a time prior to intercourse.   Refills:  11                Prescriptions were sent or printed at these locations (1 Prescription)                   Other Prescriptions                 Printed at Department/Unit printer (1 of 1)         HYDROcodone-acetaminophen (NORCO) 5-325 MG per tablet                Orders Needing Specimen Collection     None      Pending Results     No orders found from 3/24/2018 to 3/27/2018.            Pending Culture Results     No orders found from 3/24/2018 to 3/27/2018.            Pending Results Instructions     If you had any lab results that were not finalized at the time of your Discharge, you can call the ED Lab Result RN at [phone]. You will be contacted by this team for any positive Lab results or changes in treatment. The nurses are available 7 days a week from 10A to 6:30P.  You can leave a message 24 hours per day and they will return your call.        Test Results From Your Hospital Stay               Thank you for choosing [facility]       Thank you for choosing [facility] for your care. Our goal is always to provide you with excellent care. Hearing back from our patients is one way we can continue to improve our services. Please take a few minutes to complete the written survey that you may receive in the mail after you visit with us. Thank you!        [employer] Information     [employer] gives you secure access to your electronic health record. If you see a primary care provider, you can also send messages to your care team and make appointments. If you have questions, please call your primary care clinic.  If you do not have a primary care provider, please call [phone] and they will assist you.        Care EveryWhere ID     This is your Care EveryWhere ID. This could be used by other organizations to access your [place] medical records  [id number]        Equal Access to Services     DAVID COTTO AH: Cody Vo, adelaida amor, qaybta kaalmada laine remy. So [facility] [phone].    ATENCIÓN: Si habla español, tiene a christie disposición servicios gratuitos de asistencia lingüística. Llame al  [phone].    We comply with applicable federal civil rights laws and [place] laws. We do not discriminate on the basis of race, color, national origin, age, disability, sex, sexual orientation, or gender identity.            After Visit Summary       This is your record. Keep this with you and show to your community pharmacist(s) and doctor(s) at your next visit.

## 2018-03-26 NOTE — PROGRESS NOTES
Patient was seen on 3/19/2018 for DVT to the right leg started on Xarelto.  Presents today with increased leg pain and swelling to the right leg.  He is on day 7  of his Xarelto with worsening of the symptoms.  Due to limitations of urgent care patient will need to be seen in the emergency room for further workup.      No charge no intervention at Urgent Care.    Nataly Pacheco CNP

## 2018-03-26 NOTE — ED NOTES
Sent from [facility] for right knee pain posterior and anterior. No recent trauma.  CMS good. Diagnosed in ED on 3/19 with right DVT.  States sent here because pain not better in right knee.  No swelling or redness noted. No calf pain

## 2018-03-27 NOTE — ED PROVIDER NOTES
History     Chief Complaint   Patient presents with     Leg Pain     HPI  Chepe Elkins is a 52 year old male who presents with ongoing right knee pain.  Patient was seen this past weekend where ultrasound confirmed a DVT in the popliteal fossa.  Patient has been placed on Xarelto.  No chest discomfort or dyspnea.  Denies fever or chills.  Has noted no warmth or swelling over the joint.  In addition to his DVT patient was noted to have moderately severe DJD in the right knee.  Has not been able to elevate the legs he has had a return to work.  Is not wearing compression stockings.    Problem List:    Patient Active Problem List    Diagnosis Date Noted     Type 2 diabetes mellitus without complication, without long-term current use of insulin (H) 03/19/2018     Priority: Medium     Morbid obesity (H) 03/19/2018     Priority: Medium        Past Medical History:    No past medical history on file.    Past Surgical History:    No past surgical history on file.    Family History:    Family History   Problem Relation Age of Onset     DIABETES Mother      Prostate Cancer No family hx of      Colon Cancer No family hx of        Social History:  Marital Status:   [2]  Social History   Substance Use Topics     Smoking status: Former Smoker     Packs/day: 1.00     Years: 25.00     Types: Cigarettes     Quit date: 2/26/2010     Smokeless tobacco: Never Used      Comment: started at age 19     Alcohol use Yes      Comment: rarely        Medications:      HYDROcodone-acetaminophen (NORCO) 5-325 MG per tablet   metFORMIN (GLUCOPHAGE) 1000 MG tablet   rivaroxaban ANTICOAGULANT (XARELTO) 15 MG TABS tablet   oxyCODONE IR (ROXICODONE) 5 MG tablet   blood glucose monitoring (NO BRAND SPECIFIED) meter device kit   blood glucose monitoring (NO BRAND SPECIFIED) test strip   blood glucose (NO BRAND SPECIFIED) lancets standard   sildenafil (REVATIO) 20 MG tablet   Multiple Vitamin (MULTI VITAMIN MENS PO)   IBUPROFEN PO          Review of Systems  All pertinent positives and negatives are documented in the HPI.  All others reviewed and are negative .    Physical Exam   BP: 162/88  Pulse: 78  Temp: 97.3  F (36.3  C)  Resp: 18  SpO2: 95 %      Physical Exam  Morbidly obese  Hypertensive  Right leg shows some asymmetric swelling of the right calf in comparison to left.  Fullness noted in the popliteal fossa of the right knee.  No joint effusion.  No warmth erythema overlying the joint concerning for active synovitis or infection.  Thigh is nontender no swelling.  No inguinal adenopathy.  Strong palpable pulses femoral, popliteal and posterior tibial.  Heart was regular no murmur  Lungs are clear to auscultation with no history splinting  ED Course     ED Course     Procedures                       Assessments & Plan (with Medical Decision Making)  52-year-old male presents with ongoing right knee pain.  States is not being controlled by Tylenol.  Has moderately severe DJD involving the right knee and this past week and was diagnosed with DVT partially occlusive in the popliteal vein.  Has not taken the time to elevate the legs is returned to work.  Is not wearing compression stockings.  Comfort is not responding to Tylenol.. He is compliant on Xarelto.. No chest symptoms concerning for PE.  Examination noted no active synovitis or infection in the extremity.  Still had some signs for venous congestion with increased calf diameter on the right.  Tenderness in the right popliteal fossa.  Cardiopulmonary examination was unremarkable.  Plan: Advised patient that he should continue Xarelto.  Should try to elevate his extremity not beyond it all day long.  Fitted for THONY stockings for venous compression before a departure.  Continue with Tylenol for mild to moderate pain.  Avoid NSAIDs.  #15 tablets of Norco 5/325 dispensed.  Patient is aware that we cannot leave him on narcotic pain medication long-term and that he will have discomfort off  and on for the next 3 months.  Went follow with primary care provider the next 2 weeks.  Ice return if he develops any acute dyspnea or chest pain     I have reviewed the nursing notes.    I have reviewed the findings, diagnosis, plan and need for follow up with the patient.      New Prescriptions    HYDROCODONE-ACETAMINOPHEN (NORCO) 5-325 MG PER TABLET    Take 1 tablet by mouth every 6 hours as needed for moderate to severe pain       Final diagnoses:   Acute deep vein thrombosis (DVT) of right lower extremity, unspecified vein (H)   Morbid obesity (H)   Primary osteoarthritis of right knee       3/26/2018   [facility] EMERGENCY DEPARTMENT     Manjeet Vale DO  03/26/18 1929

## 2018-03-27 NOTE — ED NOTES
Discharge instructions reviewed in detail.  Pt verbalized understanding.  No further questions or concerns.   Compression sock placed on pt.

## 2018-03-27 NOTE — DISCHARGE INSTRUCTIONS
Elevate extremity-try to limit being upright and ambulating on extremities as much as she can  Apply moist heat to the backside of the right knee-30 minutes 3 times daily  Wear compression stockings  Continue with Xarelto  Consider follow-up with sports medicine provider for ongoing knee pain to address the arthritic component to the knee pain  Return to the ED for shortness of breath, chest pain-these can be signs for pulmonary embolism(blood clot in lung)

## 2018-03-28 ENCOUNTER — OFFICE VISIT (OUTPATIENT)
Dept: FAMILY MEDICINE | Facility: CLINIC | Age: 53
End: 2018-03-28
Payer: COMMERCIAL

## 2018-03-28 VITALS
SYSTOLIC BLOOD PRESSURE: 132 MMHG | HEIGHT: 73 IN | WEIGHT: 315 LBS | TEMPERATURE: 98.1 F | HEART RATE: 84 BPM | DIASTOLIC BLOOD PRESSURE: 86 MMHG | BODY MASS INDEX: 41.75 KG/M2

## 2018-03-28 DIAGNOSIS — E11.9 TYPE 2 DIABETES MELLITUS WITHOUT COMPLICATION, WITHOUT LONG-TERM CURRENT USE OF INSULIN (H): ICD-10-CM

## 2018-03-28 DIAGNOSIS — M17.0 OSTEOARTHRITIS OF BOTH KNEES, UNSPECIFIED OSTEOARTHRITIS TYPE: ICD-10-CM

## 2018-03-28 DIAGNOSIS — I82.431 ACUTE DEEP VEIN THROMBOSIS (DVT) OF POPLITEAL VEIN OF RIGHT LOWER EXTREMITY (H): Primary | ICD-10-CM

## 2018-03-28 PROCEDURE — 99213 OFFICE O/P EST LOW 20 MIN: CPT | Performed by: FAMILY MEDICINE

## 2018-03-28 RX ORDER — HYDROCODONE BITARTRATE AND ACETAMINOPHEN 5; 325 MG/1; MG/1
1 TABLET ORAL EVERY 6 HOURS PRN
Qty: 20 TABLET | Refills: 0 | COMMUNITY
Start: 2018-03-28 | End: 2018-04-27

## 2018-03-28 ASSESSMENT — PAIN SCALES - GENERAL: PAINLEVEL: MODERATE PAIN (5)

## 2018-03-28 NOTE — PATIENT INSTRUCTIONS
ASSESSMENT:   (I82.431) Acute deep vein thrombosis (DVT) of popliteal vein of right lower extremity (H)  (primary encounter diagnosis)  Comment: right leg.  Improving.    Plan:   Continue the rivaroxaban (Xarelto) at 15mg twice daily for the first 21 days, then switch to the 20mg daily dose.   Continue the compression stocking during the day.  Can be off at night.  This will help for pain and swelling.   I expect you will need to stay on the blood thinner indefinitely.   Recheck in 3 months.     (M17.0) Osteoarthritis of both knees, unspecified osteoarthritis type  Comment: This is likely also contributing to the pain.   Plan:   OK for acetaminophen.   Acetaminophen (Tylenol) may be taken up to 4000mg daily which would be 350mg, 2 pills every 4 hours or 500mg (extra strength) 2 pills 4 times a day.   Watch out for acetaminophen in other OTC or prescription medications.    Too much acetaminophen can lead to serious liver damage..    We could consider injection with cortisone if continuing pain in the next couple weeks.     (E11.9) Type 2 diabetes mellitus without complication, without long-term current use of insulin (H)  Comment: If glucometer readings remain high in the next couple weeks, we will need to add another medication.    Plan: Let me know if continuing over 200.

## 2018-03-28 NOTE — NURSING NOTE
"Chief Complaint   Patient presents with     Deep Vein Thrombosis     ER F/U       Initial /86  Pulse 84  Temp 98.1  F (36.7  C) (Tympanic)  Ht 6' 1\" (1.854 m)  Wt 321 lb (145.6 kg)  BMI 42.35 kg/m2 Estimated body mass index is 42.35 kg/(m^2) as calculated from the following:    Height as of this encounter: 6' 1\" (1.854 m).    Weight as of this encounter: 321 lb (145.6 kg).      Health Maintenance that is potentially due pending provider review:  NONE        Is there anyone who you would like to be able to receive your results? If yes have patient fill out SINDY    "

## 2018-03-28 NOTE — PROGRESS NOTES
"  SUBJECTIVE:   Chepe Elkins is a 52 year old male who presents to clinic today for the following health issues:  Chief Complaint   Patient presents with     Deep Vein Thrombosis     ER F/U      ED/UC Followup:    Facility:  [facility]  Date of visit: 03/26/2018  Reason for visit: RT knee pain with diagnosis of DVT on 03/19/2018  Current Status: doing better.     No history of DVT in the past.   Family History: no blood clots.   No recent leg injury.    Last airplane travel 2/11-2/17.  No prolonged immobilization.  Flight <3 hours.   Taking rivaroxaban (Xarelto) on a regular basis.  Has not missed doses.     Current pain in anterior knee.  No pain in calf since wearing compression stocking.    Takes pain pills at night.  Two hydrocodone/acetaminophen 5/325mg at night.  Takes acetaminophen during the day.   Leg swelling improved in lower leg.  A little swelling upper leg.   No erythema in leg.     No respiratory symptoms   No chest pains.     Patient Active Problem List   Diagnosis     Type 2 diabetes mellitus without complication, without long-term current use of insulin (H)     Morbid obesity (H)      ROS:  Glucometers range 180-300+.  Improved some in increased metformin.     OBJECTIVE:Blood pressure 132/86, pulse 84, temperature 98.1  F (36.7  C), temperature source Tympanic, height 6' 1\" (1.854 m), weight 321 lb (145.6 kg). BMI=Body mass index is 42.35 kg/(m^2).  GENERAL APPEARANCE ADULT: Alert, no acute distress, morbidly obese  MS: He is wearing compression stocking.  No edema, no erythema.  No calf or thigh tenderness.  Slight popliteal tenderness.   He has mildly decreased flexion in both knees.     ASSESSMENT:   (I82.431) Acute deep vein thrombosis (DVT) of popliteal vein of right lower extremity (H)  (primary encounter diagnosis)  Comment: right leg.  Improving.  Unprovoked DVT.  He has 10% risk of recurrent clot stopping anticoagulation after 3 months in the first year and 30% after 3 years.  I recommend " continuing anticoagulation.   Plan:   Continue the rivaroxaban (Xarelto) at 15mg twice daily for the first 21 days, then switch to the 20mg daily dose.   Continue the compression stocking during the day.  Can be off at night.  This will help for pain and swelling.   I expect you will need to stay on the blood thinner indefinitely.   Recheck in 3 months.     (M17.0) Osteoarthritis of both knees, unspecified osteoarthritis type  Comment: This is likely also contributing to the pain.   Plan:   OK for acetaminophen.   Acetaminophen (Tylenol) may be taken up to 4000mg daily which would be 350mg, 2 pills every 4 hours or 500mg (extra strength) 2 pills 4 times a day.   Watch out for acetaminophen in other OTC or prescription medications.    Too much acetaminophen can lead to serious liver damage..    We could consider injection with cortisone if continuing pain in the next couple weeks.       (E11.9) Type 2 diabetes mellitus without complication, without long-term current use of insulin (H)  Comment: If glucometer readings remain high in the next couple weeks, we will need to add another medication.    Plan: Let me know if continuing over 200.

## 2018-03-28 NOTE — MR AVS SNAPSHOT
After Visit Summary   3/28/2018    Chepe Elkins    MRN: [id number]           Patient Information     Date Of Birth          1965        Visit Information        Provider Department      3/28/2018 4:00 PM Bean Eduardo MD [facility]        Today's Diagnoses     Acute deep vein thrombosis (DVT) of popliteal vein of right lower extremity (H)    -  1    Osteoarthritis of both knees, unspecified osteoarthritis type        Type 2 diabetes mellitus without complication, without long-term current use of insulin (H)          Care Instructions    ASSESSMENT:   (I82.431) Acute deep vein thrombosis (DVT) of popliteal vein of right lower extremity (H)  (primary encounter diagnosis)  Comment: right leg.  Improving.    Plan:   Continue the rivaroxaban (Xarelto) at 15mg twice daily for the first 21 days, then switch to the 20mg daily dose.   Continue the compression stocking during the day.  Can be off at night.  This will help for pain and swelling.   I expect you will need to stay on the blood thinner indefinitely.   Recheck in 3 months.     (M17.0) Osteoarthritis of both knees, unspecified osteoarthritis type  Comment: This is likely also contributing to the pain.   Plan:   OK for acetaminophen.   Acetaminophen (Tylenol) may be taken up to 4000mg daily which would be 350mg, 2 pills every 4 hours or 500mg (extra strength) 2 pills 4 times a day.   Watch out for acetaminophen in other OTC or prescription medications.    Too much acetaminophen can lead to serious liver damage..    We could consider injection with cortisone if continuing pain in the next couple weeks.     (E11.9) Type 2 diabetes mellitus without complication, without long-term current use of insulin (H)  Comment: If glucometer readings remain high in the next couple weeks, we will need to add another medication.    Plan: Let me know if continuing over 200.           Follow-ups after your visit        Who to contact     If  "you have questions or need follow up information about today's clinic visit or your schedule please contact [facility] directly at [phone].  Normal or non-critical lab and imaging results will be communicated to you by Kids Movie[employer], letter or phone within 4 business days after the clinic has received the results. If you do not hear from us within 7 days, please contact the clinic through [employer] or phone. If you have a critical or abnormal lab result, we will notify you by phone as soon as possible.  Submit refill requests through [employer] or call your pharmacy and they will forward the refill request to us. Please allow 3 business days for your refill to be completed.          Additional Information About Your Visit        [employer] Information     TapShield gives you secure access to your electronic health record. If you see a primary care provider, you can also send messages to your care team and make appointments. If you have questions, please call your primary care clinic.  If you do not have a primary care provider, please call [phone] and they will assist you.        Care EveryWhere ID     This is your Care EveryWhere ID. This could be used by other organizations to access your [place] medical records  [id number]        Your Vitals Were     Pulse Temperature Height BMI (Body Mass Index)          84 98.1  F (36.7  C) (Tympanic) 6' 1\" (1.854 m) 42.35 kg/m2         Blood Pressure from Last 3 Encounters:   03/28/18 132/86   03/26/18 162/88   03/26/18 140/87    Weight from Last 3 Encounters:   03/28/18 321 lb (145.6 kg)   03/26/18 323 lb (146.5 kg)   03/19/18 (!) 322 lb (146.1 kg)              Today, you had the following     No orders found for display         Today's Medication Changes          These changes are accurate as of 3/28/18  4:57 PM.  If you have any questions, ask your nurse or doctor.               These medicines have changed or have updated prescriptions.        " Dose/Directions    * rivaroxaban ANTICOAGULANT 15 MG Tabs tablet   Commonly known as:  XARELTO   This may have changed:  Another medication with the same name was added. Make sure you understand how and when to take each.   Changed by:  Bean Eduardo MD        Dose:  15 mg   Take 1 tablet (15 mg) by mouth 2 times daily (with meals) for 21 days   Quantity:  42 tablet   Refills:  0       * rivaroxaban ANTICOAGULANT 20 MG Tabs tablet   Commonly known as:  XARELTO   This may have changed:  You were already taking a medication with the same name, and this prescription was added. Make sure you understand how and when to take each.   Used for:  Acute deep vein thrombosis (DVT) of popliteal vein of right lower extremity (H)   Changed by:  Bean Eduardo MD        Dose:  20 mg   Take 1 tablet (20 mg) by mouth daily (with dinner) Start after completing the 15mg twice daily dose for the first 21 days.   Quantity:  30 tablet   Refills:  11       * Notice:  This list has 2 medication(s) that are the same as other medications prescribed for you. Read the directions carefully, and ask your doctor or other care provider to review them with you.         Where to get your medicines      These medications were sent to [facility] [place] [address]     Phone:  [phone]     rivaroxaban ANTICOAGULANT 20 MG Tabs tablet                Primary Care Provider Office Phone # Fax #    Bean Eduardo [phone]       [address] [place], [place] [address]        Equal Access to Services     DAVID COTTO : Coyd barrazao Soeliza, waaxda luqadaha, qaybta kaalmada sandrita, laine hummel. So [facility] [place] [phone].    ATENCIÓN: Si habla español, tiene a christie disposición servicios gratuitos de asistencia lingüística. Llame al [phone].    We comply with applicable federal civil rights laws and [place]  laws. We do not discriminate on the basis of race, color, national origin, age, disability, sex, sexual orientation, or gender identity.            Thank you!     Thank you for choosing [facility]  for your care. Our goal is always to provide you with excellent care. Hearing back from our patients is one way we can continue to improve our services. Please take a few minutes to complete the written survey that you may receive in the mail after your visit with us. Thank you!             Your Updated Medication List - Protect others around you: Learn how to safely use, store and throw away your medicines at www.disposemymeds.org.          This list is accurate as of 3/28/18  4:57 PM.  Always use your most recent med list.                   Brand Name Dispense Instructions for use Diagnosis    blood glucose lancets standard    no brand specified    100 each    Use to test blood sugar 2-3 times daily or as directed.    Diabetes mellitus, type 2 (H)       blood glucose monitoring meter device kit    no brand specified    1 kit    Use to test blood sugar 2-3 times daily or as directed.    Diabetes mellitus, type 2 (H)       blood glucose monitoring test strip    no brand specified    100 strip    Use to test blood sugar 2-3 times daily or as directed.    Diabetes mellitus, type 2 (H)       HYDROcodone-acetaminophen 5-325 MG per tablet    NORCO    20 tablet    Take 1 tablet by mouth every 6 hours as needed for moderate to severe pain        metFORMIN 1000 MG tablet    GLUCOPHAGE    60 tablet    Take 1 tablet (1,000 mg) by mouth 2 times daily (with meals)    Type 2 diabetes mellitus without complication, without long-term current use of insulin (H)       MULTI VITAMIN MENS PO      Take 1 tablet by mouth daily        * rivaroxaban ANTICOAGULANT 15 MG Tabs tablet    XARELTO    42 tablet    Take 1 tablet (15 mg) by mouth 2 times daily (with meals) for 21 days        * rivaroxaban ANTICOAGULANT 20 MG Tabs  tablet    XARELTO    30 tablet    Take 1 tablet (20 mg) by mouth daily (with dinner) Start after completing the 15mg twice daily dose for the first 21 days.    Acute deep vein thrombosis (DVT) of popliteal vein of right lower extremity (H)       sildenafil 20 MG tablet    REVATIO    50 tablet    Sidenafil (Viagra) comes in 20mg strength pills. Take as many pills as needed up to maximum of 5 pills at a time prior to intercourse.    Erectile dysfunction, unspecified erectile dysfunction type       * Notice:  This list has 2 medication(s) that are the same as other medications prescribed for you. Read the directions carefully, and ask your doctor or other care provider to review them with you.

## 2018-04-20 ENCOUNTER — MYC MEDICAL ADVICE (OUTPATIENT)
Dept: FAMILY MEDICINE | Facility: CLINIC | Age: 53
End: 2018-04-20

## 2018-04-27 ENCOUNTER — OFFICE VISIT (OUTPATIENT)
Dept: FAMILY MEDICINE | Facility: CLINIC | Age: 53
End: 2018-04-27
Payer: COMMERCIAL

## 2018-04-27 VITALS
DIASTOLIC BLOOD PRESSURE: 88 MMHG | WEIGHT: 314 LBS | SYSTOLIC BLOOD PRESSURE: 132 MMHG | BODY MASS INDEX: 41.43 KG/M2 | HEART RATE: 82 BPM | TEMPERATURE: 97.7 F | RESPIRATION RATE: 18 BRPM

## 2018-04-27 DIAGNOSIS — M17.0 OSTEOARTHRITIS OF BOTH KNEES, UNSPECIFIED OSTEOARTHRITIS TYPE: Primary | ICD-10-CM

## 2018-04-27 DIAGNOSIS — E11.9 TYPE 2 DIABETES MELLITUS WITHOUT COMPLICATION, WITHOUT LONG-TERM CURRENT USE OF INSULIN (H): ICD-10-CM

## 2018-04-27 DIAGNOSIS — E66.01 MORBID OBESITY (H): ICD-10-CM

## 2018-04-27 PROCEDURE — 20610 DRAIN/INJ JOINT/BURSA W/O US: CPT | Performed by: FAMILY MEDICINE

## 2018-04-27 PROCEDURE — 99213 OFFICE O/P EST LOW 20 MIN: CPT | Mod: 25 | Performed by: FAMILY MEDICINE

## 2018-04-27 RX ORDER — TRIAMCINOLONE ACETONIDE 40 MG/ML
40 INJECTION, SUSPENSION INTRA-ARTICULAR; INTRAMUSCULAR ONCE
Qty: 1 ML | Refills: 0 | OUTPATIENT
Start: 2018-04-27 | End: 2019-02-22

## 2018-04-27 RX ORDER — LIDOCAINE HYDROCHLORIDE 10 MG/ML
INJECTION, SOLUTION INFILTRATION; PERINEURAL
Qty: 4 ML | Refills: 0 | OUTPATIENT
Start: 2018-04-27 | End: 2018-10-26

## 2018-04-27 ASSESSMENT — PAIN SCALES - GENERAL: PAINLEVEL: SEVERE PAIN (7)

## 2018-04-27 NOTE — PROGRESS NOTES
SUBJECTIVE:   Chepe Elkins is a 52 year old male who presents to clinic today for the following health issues:  Chief Complaint   Patient presents with     Knee Pain      R knee pain- would like to try a cortisone injection. Also, past hx of DVT behind R knee 3/19/2018     Diabetes     blood sugars still running over 200.  am blood sugars 190-250, pm- 230-270      2/26/2018 knee xrays:  IMPRESSION: Two views of the right knee show at least moderate medial  joint space narrowing and minimal patellofemoral degenerative marginal  bony spurring. No acute abnormality. Probable small joint space  effusion. Single AP view of the left knee shows mild medial joint  space narrowing.    He has had previous knee xrays on 3/19/2018:  IMPRESSION: Moderate medial joint space narrowing and mild  patellofemoral degenerative marginal bony spurring. This is unchanged  since prior. Small joint effusion. No evidence of fracture.    Musculoskeletal problem/pain      Duration: osteoarthritis both knees    Description  Location: R knee pain    Intensity:  severe, 7/10    Accompanying signs and symptoms: swelling  History recent DVT behind R knee  Previous similar problem: YES  Previous evaluation:  x-ray    Precipitating or alleviating factors:  Trauma or overuse: no   Aggravating factors include: standing, walking, climbing stairs and overuse    Therapies tried and outcome: rest/inactivity and acetaminophen  Discuss diabetes and blood sugars still over 200 most days.      He has had persistent knee pain.  Mostly right knee.  A little pain in left knee.   Takes acetaminophen which helps some.   He is interested in trying knee injection.     Problem 2: He has been taking metformin 1000mg twice daily for a month.   Lab Results   Component Value Date    A1C 12.5 03/19/2018       Wt Readings from Last 5 Encounters:   04/27/18 314 lb (142.4 kg)   03/28/18 321 lb (145.6 kg)   03/26/18 323 lb (146.5 kg)   03/19/18 (!) 322 lb (146.1 kg)    03/19/18 (!) 321 lb (145.6 kg)      Trying to cut down on portions.    Would like to increase activity to improve blood glucose.     Recent DVT.  He continues on the rivaroxaban (Xarelto)  No pain in calf or thigh.  Can have a little aching at times.     Patient Active Problem List   Diagnosis     Type 2 diabetes mellitus without complication, without long-term current use of insulin (H)     Morbid obesity (H)     Acute deep vein thrombosis (DVT) of popliteal vein of right lower extremity (H)     Osteoarthritis of both knees, unspecified osteoarthritis type      OBJECTIVE:Blood pressure 132/88, pulse 82, temperature 97.7  F (36.5  C), temperature source Tympanic, resp. rate 18, weight 314 lb (142.4 kg). BMI=Body mass index is 41.43 kg/(m^2).  GENERAL APPEARANCE ADULT: Alert, no acute distress, obese  MS: Knees without erythema or swelling.      ASSESSMENT:   (M17.0) Osteoarthritis of both knees, unspecified osteoarthritis type  (primary encounter diagnosis)  Comment: He wanted to try injections  Plan:   Both knees injected today.    Possible risks of joint injection include; damage to tendons and ligaments, skin atrophy (thinning of skin and lighter skin color) and infection of the joint.     The benefit of injecting the joint is to relieve pain.   Procedure:  The joint was prepped with Betadyne and injected with 1 cc Kenalog (40 mg) and 2 cc 1% xylocaine.  I used sterile technique and anteromedial approach. No complications.    Bandaid dressing was applied.      Sometimes the injected joint feels worse in the first 24 hours, but if the injection is successful, the pain should be better within the next 3 days or so.  Go easy on the joint for the next several days after injection.    If injection is helpful, it could be repeated 3-4 times a year (at least 3 months apart).   Let me know if injection not helping, we can consider other options for treatment.       (E11.9) Type 2 diabetes mellitus without complication,  without long-term current use of insulin (H)  Comment: not well controlled on metformin at good dose  Plan: canagliflozin (INVOKANA) 100 MG tablet          Continue the metformin 1000mg twice daily as you have been.   Add canagliflozin or similar once daily.    REcheck in 3 months for another Hgb A1C.    (E66.01) Morbid obesity (H)  Comment: Hopefully new medication will help for weight.   Plan: Keep up the work on weight loss.

## 2018-04-27 NOTE — NURSING NOTE
"Chief Complaint   Patient presents with     Knee Pain      R knee pain- would like to try a cortisone injection. Also, past hx of DVT behind R knee 3/19/2018     Diabetes     blood sugars still running over 200.  am blood sugars 190-250, pm- 230-270       Initial /88 (BP Location: Right arm, Patient Position: Chair, Cuff Size: Adult Large)  Pulse 82  Temp 97.7  F (36.5  C) (Tympanic)  Resp 18  Wt 314 lb (142.4 kg)  BMI 41.43 kg/m2 Estimated body mass index is 41.43 kg/(m^2) as calculated from the following:    Height as of 3/28/18: 6' 1\" (1.854 m).    Weight as of this encounter: 314 lb (142.4 kg).      Health Maintenance that is potentially due pending provider review:  NONE    n/a    Is there anyone who you would like to be able to receive your results? Yes wife Lesa  If yes have patient fill out SINDY  Dionne Scott CMA    "

## 2018-04-27 NOTE — MR AVS SNAPSHOT
After Visit Summary   4/27/2018    Chepe Elkins    MRN: [id number]           Patient Information     Date Of Birth          1965        Visit Information        Provider Department      4/27/2018 3:00 PM Bean Eduardo MD [facility]        Today's Diagnoses     Osteoarthritis of both knees, unspecified osteoarthritis type    -  1    Type 2 diabetes mellitus without complication, without long-term current use of insulin (H)        Morbid obesity (H)          Care Instructions    ASSESSMENT:   (M17.0) Osteoarthritis of both knees, unspecified osteoarthritis type  (primary encounter diagnosis)  Comment: He wanted to try injections  Plan:   Both knees injected today.    Possible risks of joint injection include; damage to tendons and ligaments, skin atrophy (thinning of skin and lighter skin color) and infection of the joint.     The benefit of injecting the joint is to relieve pain.   Procedure:  The joint was prepped with Betadyne and injected with 1 cc Kenalog (40 mg) and 2 cc 1% xylocaine.  I used sterile technique and anteromedial approach. No complications.    Bandaid dressing was applied.      Sometimes the injected joint feels worse in the first 24 hours, but if the injection is successful, the pain should be better within the next 3 days or so.  Go easy on the joint for the next several days after injection.    If injection is helpful, it could be repeated 3-4 times a year (at least 3 months apart).   Let me know if injection not helping, we can consider other options for treatment.       (E11.9) Type 2 diabetes mellitus without complication, without long-term current use of insulin (H)  Comment: not well controlled on metformin at good dose  Plan: canagliflozin (INVOKANA) 100 MG tablet          Continue the metformin 1000mg twice daily as you have been.   Add canagliflozin or similar once daily.    REcheck in 3 months for another Hgb A1C.    (E66.01) Morbid obesity  (H)  Comment: Hopefully new medication will help for weight.   Plan: Keep up the work on weight loss.           Follow-ups after your visit        Who to contact     If you have questions or need follow up information about today's clinic visit or your schedule please contact [facility] directly at [phone].  Normal or non-critical lab and imaging results will be communicated to you by MyChart, letter or phone within 4 business days after the clinic has received the results. If you do not hear from us within 7 days, please contact the clinic through [employer] or phone. If you have a critical or abnormal lab result, we will notify you by phone as soon as possible.  Submit refill requests through [employer] or call your pharmacy and they will forward the refill request to us. Please allow 3 business days for your refill to be completed.          Additional Information About Your Visit        MyChart Information     [employer] gives you secure access to your electronic health record. If you see a primary care provider, you can also send messages to your care team and make appointments. If you have questions, please call your primary care clinic.  If you do not have a primary care provider, please call [phone] and they will assist you.        Care EveryWhere ID     This is your Care EveryWhere ID. This could be used by other organizations to access your [place] medical records  [id number]        Your Vitals Were     Pulse Temperature Respirations BMI (Body Mass Index)          82 97.7  F (36.5  C) (Tympanic) 18 41.43 kg/m2         Blood Pressure from Last 3 Encounters:   04/27/18 132/88   03/28/18 132/86   03/26/18 162/88    Weight from Last 3 Encounters:   04/27/18 314 lb (142.4 kg)   03/28/18 321 lb (145.6 kg)   03/26/18 323 lb (146.5 kg)              Today, you had the following     No orders found for display         Today's Medication Changes          These changes are accurate as of 4/27/18   4:16 PM.  If you have any questions, ask your nurse or doctor.               Start taking these medicines.        Dose/Directions    canagliflozin 100 MG tablet   Commonly known as:  INVOKANA   Used for:  Type 2 diabetes mellitus without complication, without long-term current use of insulin (H)   Started by:  Bean Eduardo MD        Dose:  100 mg   Take 1 tablet (100 mg) by mouth every morning (before breakfast)   Quantity:  30 tablet   Refills:  3            Where to get your medicines      These medications were sent to [place] [address]     Phone:  [phone]     canagliflozin 100 MG tablet                Primary Care Provider Office Phone # Fax #    Bean Eduardo [phone]       [address] [place] [id number]        Equal Access to Services     [facility] : Hadii aad ku hadasho Soomaali, waaxda luqadaha, qaybta kaalmada fcoyaanne, laine hummel. So [facility] [phone].    ATENCIÓN: Si habla español, tiene a christie disposición servicios gratuitos de asistencia lingüística. [facility] [phone].    We comply with applicable federal civil rights laws and [place] laws. We do not discriminate on the basis of race, color, national origin, age, disability, sex, sexual orientation, or gender identity.            Thank you!     Thank you for choosing [facility]  for your care. Our goal is always to provide you with excellent care. Hearing back from our patients is one way we can continue to improve our services. Please take a few minutes to complete the written survey that you may receive in the mail after your visit with us. Thank you!             Your Updated Medication List - Protect others around you: Learn how to safely use, store and throw away your medicines at www.disposemymeds.org.          This list is accurate as of 4/27/18  4:16 PM.   Always use your most recent med list.                   Brand Name Dispense Instructions for use Diagnosis    blood glucose lancets standard    no brand specified    100 each    Use to test blood sugar 2-3 times daily or as directed.    Diabetes mellitus, type 2 (H)       blood glucose monitoring meter device kit    no brand specified    1 kit    Use to test blood sugar 2-3 times daily or as directed.    Diabetes mellitus, type 2 (H)       blood glucose monitoring test strip    no brand specified    100 strip    Use to test blood sugar 2-3 times daily or as directed.    Diabetes mellitus, type 2 (H)       canagliflozin 100 MG tablet    INVOKANA    30 tablet    Take 1 tablet (100 mg) by mouth every morning (before breakfast)    Type 2 diabetes mellitus without complication, without long-term current use of insulin (H)       metFORMIN 1000 MG tablet    GLUCOPHAGE    60 tablet    Take 1 tablet (1,000 mg) by mouth 2 times daily (with meals)    Type 2 diabetes mellitus without complication, without long-term current use of insulin (H)       MULTI VITAMIN MENS PO      Take 1 tablet by mouth daily        rivaroxaban ANTICOAGULANT 20 MG Tabs tablet    XARELTO    30 tablet    Take 1 tablet (20 mg) by mouth daily (with dinner) Start after completing the 15mg twice daily dose for the first 21 days.    Acute deep vein thrombosis (DVT) of popliteal vein of right lower extremity (H)       sildenafil 20 MG tablet    REVATIO    50 tablet    Sidenafil (Viagra) comes in 20mg strength pills. Take as many pills as needed up to maximum of 5 pills at a time prior to intercourse.    Erectile dysfunction, unspecified erectile dysfunction type

## 2018-04-27 NOTE — PATIENT INSTRUCTIONS
ASSESSMENT:   (M17.0) Osteoarthritis of both knees, unspecified osteoarthritis type  (primary encounter diagnosis)  Comment: He wanted to try injections  Plan:   Both knees injected today.    Possible risks of joint injection include; damage to tendons and ligaments, skin atrophy (thinning of skin and lighter skin color) and infection of the joint.     The benefit of injecting the joint is to relieve pain.   Procedure:  The joint was prepped with Betadyne and injected with 1 cc Kenalog (40 mg) and 2 cc 1% xylocaine.  I used sterile technique and anteromedial approach. No complications.    Bandaid dressing was applied.      Sometimes the injected joint feels worse in the first 24 hours, but if the injection is successful, the pain should be better within the next 3 days or so.  Go easy on the joint for the next several days after injection.    If injection is helpful, it could be repeated 3-4 times a year (at least 3 months apart).   Let me know if injection not helping, we can consider other options for treatment.       (E11.9) Type 2 diabetes mellitus without complication, without long-term current use of insulin (H)  Comment: not well controlled on metformin at good dose  Plan: canagliflozin (INVOKANA) 100 MG tablet          Continue the metformin 1000mg twice daily as you have been.   Add canagliflozin or similar once daily.    REcheck in 3 months for another Hgb A1C.    (E66.01) Morbid obesity (H)  Comment: Hopefully new medication will help for weight.   Plan: Keep up the work on weight loss.

## 2018-05-01 NOTE — TELEPHONE ENCOUNTER
[employer] message sent to patient.  Due to A1c, elevated blood sugars, and recent BL knee steroid injections patients blood sugars are likely significantly elevated.  Would recommend considering GLP-1 and insulin medications as next steps for diabetes management.    Veronica Cesar MS, RD, LD, CDE

## 2018-05-01 NOTE — TELEPHONE ENCOUNTER
Diabetes Education Scheduling Outreach #2:    Call to patient to schedule. Left message with phone number to call to schedule.    Letter sent to patient requesting to call to schedule.    Dick Nuñez OnCall  Diabetes and Nutrition Scheduling

## 2018-07-23 ENCOUNTER — MYC MEDICAL ADVICE (OUTPATIENT)
Dept: FAMILY MEDICINE | Facility: CLINIC | Age: 53
End: 2018-07-23

## 2018-10-26 ENCOUNTER — OFFICE VISIT (OUTPATIENT)
Dept: FAMILY MEDICINE | Facility: CLINIC | Age: 53
End: 2018-10-26
Payer: COMMERCIAL

## 2018-10-26 VITALS
OXYGEN SATURATION: 97 % | SYSTOLIC BLOOD PRESSURE: 136 MMHG | TEMPERATURE: 97.7 F | HEART RATE: 90 BPM | BODY MASS INDEX: 41.16 KG/M2 | WEIGHT: 312 LBS | DIASTOLIC BLOOD PRESSURE: 88 MMHG | RESPIRATION RATE: 20 BRPM

## 2018-10-26 DIAGNOSIS — Z23 NEED FOR PROPHYLACTIC VACCINATION AND INOCULATION AGAINST INFLUENZA: ICD-10-CM

## 2018-10-26 DIAGNOSIS — I82.431 ACUTE DEEP VEIN THROMBOSIS (DVT) OF POPLITEAL VEIN OF RIGHT LOWER EXTREMITY (H): ICD-10-CM

## 2018-10-26 DIAGNOSIS — E11.9 TYPE 2 DIABETES MELLITUS WITHOUT COMPLICATION, WITHOUT LONG-TERM CURRENT USE OF INSULIN (H): Primary | ICD-10-CM

## 2018-10-26 LAB — HBA1C MFR BLD: 10.5 % (ref 0–5.6)

## 2018-10-26 PROCEDURE — 99213 OFFICE O/P EST LOW 20 MIN: CPT | Mod: 25 | Performed by: FAMILY MEDICINE

## 2018-10-26 PROCEDURE — 90682 RIV4 VACC RECOMBINANT DNA IM: CPT | Performed by: FAMILY MEDICINE

## 2018-10-26 PROCEDURE — 90471 IMMUNIZATION ADMIN: CPT | Performed by: FAMILY MEDICINE

## 2018-10-26 PROCEDURE — 36415 COLL VENOUS BLD VENIPUNCTURE: CPT | Performed by: FAMILY MEDICINE

## 2018-10-26 PROCEDURE — 90732 PPSV23 VACC 2 YRS+ SUBQ/IM: CPT | Performed by: FAMILY MEDICINE

## 2018-10-26 PROCEDURE — 83036 HEMOGLOBIN GLYCOSYLATED A1C: CPT | Performed by: FAMILY MEDICINE

## 2018-10-26 PROCEDURE — 90472 IMMUNIZATION ADMIN EACH ADD: CPT | Performed by: FAMILY MEDICINE

## 2018-10-26 ASSESSMENT — PAIN SCALES - GENERAL: PAINLEVEL: EXTREME PAIN (9)

## 2018-10-26 NOTE — MR AVS SNAPSHOT
After Visit Summary   10/26/2018    Chepe Elkins    MRN: [id number]           Patient Information     Date Of Birth          1965        Visit Information        Provider Department      10/26/2018 3:40 PM Bean Eduardo MD [facility]        Today's Diagnoses     Type 2 diabetes mellitus without complication, without long-term current use of insulin (H)    -  1    Acute deep vein thrombosis (DVT) of popliteal vein of right lower extremity (H)          Care Instructions    ASSESSMENT:   (E11.9) Type 2 diabetes mellitus without complication, without long-term current use of insulin (H)  (primary encounter diagnosis)  Comment: Improved but still not well controlled.  Avg glucometer is still 250.  OUr goal is to have your Hgb A1C <8 for sure and closer to 7 if possible.  IF we can improve Hgb A1C, there is evidence we can lessen damage to eyes, kidneys and nerves to feet.   Plan: Hemoglobin A1c, empagliflozin (JARDIANCE) 10 MG        TABS tablet    Continue the metformin at 1000mg twice daily.  Start the empagliflozin (Jardiance) at 10mg once daily.    Continue to check glucometers.   Check blood sugars at various times; in the morning when fasting, before meals and at bedtime.  You can also check 1-2 hours after a meal.  Normal fasting blood sugar is <100 generally <120 before meals and after meals is <140.  It is helpful to check at different times of the day to see how blood sugars are doing as a brief snapshot.    Keep a record of the readings to see if there are consistent highs or lows at certain times of the day.  This will help in adjusting medication.   Start by checking 1 times a day    Before you refill the empagliflozin, if glucometers are still over 200, we can increase the dose.  Call before you refill.     Recheck Hgb A1C in 3 months.      Keep working on the weight loss-this is the most critical in controlling the diabetes mellitus.   Schedule an appointment with  dietician.           (U72.752) Acute deep vein thrombosis (DVT) of popliteal vein of right lower extremity (H)  Comment:   Plan: Continue the rivastigmine as you have been doing.           Follow-ups after your visit        Additional Services     NUTRITION REFERRAL       Your provider has referred you to: SARITA: [facility] [phone]   [email or web address]    Please be aware that coverage of these services is subject to the terms and limitations of your health insurance plan.  Call member services at your health plan with any benefit or coverage questions.      Please bring the following with you to your appointment:    (1) This referral request  (2) Any documents given to you regarding this referral  (3) Any specific questions you have about diet and/or food choices                  Follow-up notes from your care team     Return in about 3 months (around 1/26/2019).      Who to contact     If you have questions or need follow up information about today's clinic visit or your schedule please contact [facility] directly at [phone].  Normal or non-critical lab and imaging results will be communicated to you by ClassBadgeshart, letter or phone within 4 business days after the clinic has received the results. If you do not hear from us within 7 days, please contact the clinic through Tailwindt or phone. If you have a critical or abnormal lab result, we will notify you by phone as soon as possible.  Submit refill requests through [employer]Media or call your pharmacy and they will forward the refill request to us. Please allow 3 business days for your refill to be completed.          Additional Information About Your Visit        ClassBadgeshart Information     [employer] gives you secure access to your electronic health record. If you see a primary care provider, you can also send messages to your care team and make appointments. If you have questions, please call your primary  care clinic.  If you do not have a primary care provider, please call [phone] and they will assist you.        Care EveryWhere ID     This is your Care EveryWhere ID. This could be used by other organizations to access your [facility] medical records  [id number]        Your Vitals Were     Pulse Temperature Respirations Pulse Oximetry BMI (Body Mass Index)       90 97.7  F (36.5  C) 20 97% 41.16 kg/m2        Blood Pressure from Last 3 Encounters:   10/26/18 136/88   04/27/18 132/88   03/28/18 132/86    Weight from Last 3 Encounters:   10/26/18 312 lb (141.5 kg)   04/27/18 314 lb (142.4 kg)   03/28/18 321 lb (145.6 kg)              We Performed the Following     Hemoglobin A1c     NUTRITION REFERRAL          Today's Medication Changes          These changes are accurate as of 10/26/18  5:27 PM.  If you have any questions, ask your nurse or doctor.               Start taking these medicines.        Dose/Directions    empagliflozin 10 MG Tabs tablet   Commonly known as:  JARDIANCE   Used for:  Type 2 diabetes mellitus without complication, without long-term current use of insulin (H)   Replaces:  canagliflozin 100 MG tablet   Started by:  Bean Eduardo MD        Dose:  10 mg   Take 1 tablet (10 mg) by mouth daily   Quantity:  30 tablet   Refills:  11         Stop taking these medicines if you haven't already. Please contact your care team if you have questions.     canagliflozin 100 MG tablet   Commonly known as:  INVOKANA   Replaced by:  empagliflozin 10 MG Tabs tablet   Stopped by:  Bean Eduardo MD           lidocaine 1 % injection   Stopped by:  Bean Eduardo MD                Where to get your medicines      These medications were sent to [facility] [address] [address]     Phone:  [phone]     empagliflozin 10 MG Tabs tablet                Primary Care Provider Office Phone # Fax #    Bean Eduardo MD  [phone]       [id number] [facility] [address]        Equal Access to Services     DAVID CTOTO : Cody aad ku haddima Vo, wamykelda luninfausman, ceciliata kafordda sandrita, laine dubonmehdi hummel. So [facility] [phone].    ATENCIÓN: Si habla español, tiene a christie disposición servicios gratuitos de asistencia lingüística. Llame al [phone].    We comply with applicable federal civil rights laws and [place] laws. We do not discriminate on the basis of race, color, national origin, age, disability, sex, sexual orientation, or gender identity.            Thank you!     Thank you for choosing [facility]  for your care. Our goal is always to provide you with excellent care. Hearing back from our patients is one way we can continue to improve our services. Please take a few minutes to complete the written survey that you may receive in the mail after your visit with us. Thank you!             Your Updated Medication List - Protect others around you: Learn how to safely use, store and throw away your medicines at www.disposemymeds.org.          This list is accurate as of 10/26/18  5:27 PM.  Always use your most recent med list.                   Brand Name Dispense Instructions for use Diagnosis    blood glucose lancets standard    no brand specified    100 each    Use to test blood sugar 2-3 times daily or as directed.    Diabetes mellitus, type 2 (H)       blood glucose monitoring meter device kit    no brand specified    1 kit    Use to test blood sugar 2-3 times daily or as directed.    Diabetes mellitus, type 2 (H)       blood glucose monitoring test strip    no brand specified    100 strip    Use to test blood sugar 2-3 times daily or as directed.    Diabetes mellitus, type 2 (H)       empagliflozin 10 MG Tabs tablet    JARDIANCE    30 tablet    Take 1 tablet (10 mg) by mouth daily    Type 2 diabetes mellitus without complication, without long-term  current use of insulin (H)       metFORMIN 1000 MG tablet    GLUCOPHAGE    60 tablet    Take 1 tablet (1,000 mg) by mouth 2 times daily (with meals)    Type 2 diabetes mellitus without complication, without long-term current use of insulin (H)       MULTI VITAMIN MENS PO      Take 1 tablet by mouth daily        rivaroxaban ANTICOAGULANT 20 MG Tabs tablet    XARELTO    30 tablet    Take 1 tablet (20 mg) by mouth daily (with dinner) Start after completing the 15mg twice daily dose for the first 21 days.    Acute deep vein thrombosis (DVT) of popliteal vein of right lower extremity (H)       sildenafil 20 MG tablet    REVATIO    50 tablet    Sidenafil (Viagra) comes in 20mg strength pills. Take as many pills as needed up to maximum of 5 pills at a time prior to intercourse.    Erectile dysfunction, unspecified erectile dysfunction type

## 2018-10-26 NOTE — PROGRESS NOTES
SUBJECTIVE:   Chepe Elkins is a 53 year old male who presents to clinic today for the following health issues:  Chief Complaint   Patient presents with     Diabetes       Diabetes Follow-up    Patient is checking blood sugars: once daily.  Results are as follows:         am - 230-260    Currently taking metformin 1000mg twice daily.  Does not miss doses.     He started canagliflozin but insurance did not cover.  He was on the canagliflozin for a few months.  Ran out about 2 months ago.  No side effects noted. He did note glucometers improved.  When on the Invokana glucometers were around 200.     Diabetic concerns: blood sugar frequently over 200     Symptoms of hypoglycemia (low blood sugar): none     Paresthesias (numbness or burning in feet) or sores: No     Date of last diabetic eye exam: due    BP Readings from Last 2 Encounters:   10/26/18 136/88   04/27/18 132/88     Hemoglobin A1C (%)   Date Value   10/26/2018 10.5 (H)   03/19/2018 12.5 (H)     LDL Cholesterol Calculated (mg/dL)   Date Value   02/27/2018 93       Diabetes Management Resources    Amount of exercise or physical activity: work only    Problems taking medications regularly: Yes,  cost of medication    Medication side effects:     Diet:     Weight down since March.   Wt Readings from Last 5 Encounters:   10/26/18 312 lb (141.5 kg)   04/27/18 314 lb (142.4 kg)   03/28/18 321 lb (145.6 kg)   03/26/18 323 lb (146.5 kg)   03/19/18 (!) 322 lb (146.1 kg)      Exercise:no.  Occupation: installing windows.      Patient Active Problem List   Diagnosis     Type 2 diabetes mellitus without complication, without long-term current use of insulin (H)     Morbid obesity (H)     Acute deep vein thrombosis (DVT) of popliteal vein of right lower extremity (H)     Osteoarthritis of both knees, unspecified osteoarthritis type      ROS:General: POSITIVE for:, weight loss, see above   Resp: No coughing, wheezing or shortness of breath  CV: No chest pains or  palpitations  GI: No nausea, vomiting,  heartburn, abdominal pain, diarrhea, constipation or change in bowel habits  : POSITIVE for:, urinary frequency, improved since taking diabetes mellitus meds.  nocturiaX1  Musculoskeletal: POSITIVE  for:, pain fingers and legs.  Knee pain.  He had injection in both knees.  It was helpful.  Now right knee pain.  Left knee has been doing OK.  Takes acetaminophen two pills which helps.   Psychiatric: No problems with anxiety, depression or mental health  Legs seem weaker later in the day.  Some aching.  No swelling.  Wears compression stockings.     OBJECTIVE:Blood pressure 136/88, pulse 90, temperature 97.7  F (36.5  C), resp. rate 20, weight 312 lb (141.5 kg), SpO2 97 %. BMI=Body mass index is 41.16 kg/(m^2).  GENERAL APPEARANCE ADULT: Alert, no acute distress, morbidly obese     ASSESSMENT:   (E11.9) Type 2 diabetes mellitus without complication, without long-term current use of insulin (H)  (primary encounter diagnosis)  Comment: Improved but still not well controlled.  Avg glucometer is still 250.  OUr goal is to have your Hgb A1C <8 for sure and closer to 7 if possible.  IF we can improve Hgb A1C, there is evidence we can lessen damage to eyes, kidneys and nerves to feet.   Plan: Hemoglobin A1c, empagliflozin (JARDIANCE) 10 MG        TABS tablet    Continue the metformin at 1000mg twice daily.  Start the empagliflozin (Jardiance) at 10mg once daily.    Continue to check glucometers.   Check blood sugars at various times; in the morning when fasting, before meals and at bedtime.  You can also check 1-2 hours after a meal.  Normal fasting blood sugar is <100 generally <120 before meals and after meals is <140.  It is helpful to check at different times of the day to see how blood sugars are doing as a brief snapshot.    Keep a record of the readings to see if there are consistent highs or lows at certain times of the day.  This will help in adjusting medication.   Start by  checking 1 times a day    Before you refill the empagliflozin, if glucometers are still over 200, we can increase the dose.  Call before you refill.     Recheck Hgb A1C in 3 months.      Keep working on the weight loss-this is the most critical in controlling the diabetes mellitus.   Schedule an appointment with dietician.           (C72.782) Acute deep vein thrombosis (DVT) of popliteal vein of right lower extremity (H)  Comment:   Plan: Continue the rivastigmine as you have been doing.       Glucose Log

## 2018-10-26 NOTE — PATIENT INSTRUCTIONS
ASSESSMENT:   (E11.9) Type 2 diabetes mellitus without complication, without long-term current use of insulin (H)  (primary encounter diagnosis)  Comment: Improved but still not well controlled.  Avg glucometer is still 250.  OUr goal is to have your Hgb A1C <8 for sure and closer to 7 if possible.  IF we can improve Hgb A1C, there is evidence we can lessen damage to eyes, kidneys and nerves to feet.   Plan: Hemoglobin A1c, empagliflozin (JARDIANCE) 10 MG        TABS tablet    Continue the metformin at 1000mg twice daily.  Start the empagliflozin (Jardiance) at 10mg once daily.    Continue to check glucometers.   Check blood sugars at various times; in the morning when fasting, before meals and at bedtime.  You can also check 1-2 hours after a meal.  Normal fasting blood sugar is <100 generally <120 before meals and after meals is <140.  It is helpful to check at different times of the day to see how blood sugars are doing as a brief snapshot.    Keep a record of the readings to see if there are consistent highs or lows at certain times of the day.  This will help in adjusting medication.   Start by checking 1 times a day    Before you refill the empagliflozin, if glucometers are still over 200, we can increase the dose.  Call before you refill.     Recheck Hgb A1C in 3 months.      Keep working on the weight loss-this is the most critical in controlling the diabetes mellitus.   Schedule an appointment with dietician.           (L11.348) Acute deep vein thrombosis (DVT) of popliteal vein of right lower extremity (H)  Comment:   Plan: Continue the rivastigmine as you have been doing.

## 2018-10-26 NOTE — NURSING NOTE
"Chief Complaint   Patient presents with     Diabetes       Initial /90 (BP Location: Right arm, Patient Position: Chair, Cuff Size: Adult Large)  Pulse 90  Temp 97.7  F (36.5  C)  Resp 20  Wt 312 lb (141.5 kg)  SpO2 97%  BMI 41.16 kg/m2 Estimated body mass index is 41.16 kg/(m^2) as calculated from the following:    Height as of 3/28/18: 6' 1\" (1.854 m).    Weight as of this encounter: 312 lb (141.5 kg).    Patient presents to the clinic using No DME    Health Maintenance that is potentially due pending provider review:  Not addressed patient 45 mn late for appt        Dionne Scott CMA        "

## 2018-10-26 NOTE — PROGRESS NOTES
Injectable Influenza Immunization Documentation    1.  Is the person to be vaccinated sick today?   No    2. Does the person to be vaccinated have an allergy to a component   of the vaccine?   No  Egg Allergy Algorithm Link    3. Has the person to be vaccinated ever had a serious reaction   to influenza vaccine in the past?   No    4. Has the person to be vaccinated ever had Guillain-Barré syndrome?   No    Form completed by Dionne Scott CMA  Prior to injection verified patient identity using patient's name and date of birth.  Due to injection administration, patient instructed to remain in clinic for 15 minutes  afterwards, and to report any adverse reaction to me immediately.

## 2019-02-22 ENCOUNTER — OFFICE VISIT (OUTPATIENT)
Dept: FAMILY MEDICINE | Facility: CLINIC | Age: 54
End: 2019-02-22
Payer: COMMERCIAL

## 2019-02-22 VITALS
RESPIRATION RATE: 16 BRPM | BODY MASS INDEX: 41.75 KG/M2 | TEMPERATURE: 99.3 F | DIASTOLIC BLOOD PRESSURE: 86 MMHG | HEIGHT: 73 IN | SYSTOLIC BLOOD PRESSURE: 130 MMHG | WEIGHT: 315 LBS | HEART RATE: 100 BPM

## 2019-02-22 DIAGNOSIS — E78.5 HYPERLIPIDEMIA LDL GOAL <100: ICD-10-CM

## 2019-02-22 DIAGNOSIS — E66.01 MORBID OBESITY (H): ICD-10-CM

## 2019-02-22 DIAGNOSIS — M25.561 CHRONIC PAIN OF RIGHT KNEE: ICD-10-CM

## 2019-02-22 DIAGNOSIS — N52.9 ERECTILE DYSFUNCTION, UNSPECIFIED ERECTILE DYSFUNCTION TYPE: ICD-10-CM

## 2019-02-22 DIAGNOSIS — G89.29 CHRONIC PAIN OF RIGHT KNEE: ICD-10-CM

## 2019-02-22 DIAGNOSIS — I82.431 ACUTE DEEP VEIN THROMBOSIS (DVT) OF POPLITEAL VEIN OF RIGHT LOWER EXTREMITY (H): ICD-10-CM

## 2019-02-22 DIAGNOSIS — E11.9 TYPE 2 DIABETES MELLITUS WITHOUT COMPLICATION, WITHOUT LONG-TERM CURRENT USE OF INSULIN (H): Primary | ICD-10-CM

## 2019-02-22 LAB
ANION GAP SERPL CALCULATED.3IONS-SCNC: 4 MMOL/L (ref 3–14)
BUN SERPL-MCNC: 20 MG/DL (ref 7–30)
CALCIUM SERPL-MCNC: 8.9 MG/DL (ref 8.5–10.1)
CHLORIDE SERPL-SCNC: 100 MMOL/L (ref 94–109)
CHOLEST SERPL-MCNC: 182 MG/DL
CO2 SERPL-SCNC: 31 MMOL/L (ref 20–32)
CREAT SERPL-MCNC: 0.74 MG/DL (ref 0.66–1.25)
CREAT UR-MCNC: 60 MG/DL
GFR SERPL CREATININE-BSD FRML MDRD: >90 ML/MIN/{1.73_M2}
GLUCOSE SERPL-MCNC: 225 MG/DL (ref 70–99)
HBA1C MFR BLD: 10.5 % (ref 0–5.6)
HDLC SERPL-MCNC: 31 MG/DL
LDLC SERPL CALC-MCNC: ABNORMAL MG/DL
LDLC SERPL DIRECT ASSAY-MCNC: 100 MG/DL
MICROALBUMIN UR-MCNC: 30 MG/L
MICROALBUMIN/CREAT UR: 49.92 MG/G CR (ref 0–17)
NONHDLC SERPL-MCNC: 151 MG/DL
POTASSIUM SERPL-SCNC: 4.1 MMOL/L (ref 3.4–5.3)
SODIUM SERPL-SCNC: 135 MMOL/L (ref 133–144)
TRIGL SERPL-MCNC: 482 MG/DL

## 2019-02-22 PROCEDURE — 99213 OFFICE O/P EST LOW 20 MIN: CPT | Performed by: FAMILY MEDICINE

## 2019-02-22 PROCEDURE — 83036 HEMOGLOBIN GLYCOSYLATED A1C: CPT | Performed by: FAMILY MEDICINE

## 2019-02-22 PROCEDURE — 80048 BASIC METABOLIC PNL TOTAL CA: CPT | Performed by: FAMILY MEDICINE

## 2019-02-22 PROCEDURE — 80061 LIPID PANEL: CPT | Performed by: FAMILY MEDICINE

## 2019-02-22 PROCEDURE — 82043 UR ALBUMIN QUANTITATIVE: CPT | Performed by: FAMILY MEDICINE

## 2019-02-22 PROCEDURE — 83721 ASSAY OF BLOOD LIPOPROTEIN: CPT | Performed by: FAMILY MEDICINE

## 2019-02-22 PROCEDURE — 36415 COLL VENOUS BLD VENIPUNCTURE: CPT | Performed by: FAMILY MEDICINE

## 2019-02-22 RX ORDER — SILDENAFIL CITRATE 20 MG/1
TABLET ORAL
Qty: 50 TABLET | Refills: 11 | Status: SHIPPED | OUTPATIENT
Start: 2019-02-22 | End: 2020-02-18

## 2019-02-22 ASSESSMENT — MIFFLIN-ST. JEOR: SCORE: 2368.56

## 2019-02-22 NOTE — NURSING NOTE
"Chief Complaint   Patient presents with     Diabetes       Initial /86 (BP Location: Right arm, Patient Position: Sitting, Cuff Size: Adult Large)   Pulse 100   Temp 99.3  F (37.4  C) (Tympanic)   Resp 16   Ht 1.854 m (6' 1\")   Wt 146 kg (321 lb 12.8 oz)   BMI 42.46 kg/m   Estimated body mass index is 42.46 kg/m  as calculated from the following:    Height as of this encounter: 1.854 m (6' 1\").    Weight as of this encounter: 146 kg (321 lb 12.8 oz).    Patient presents to the clinic using No DME    Health Maintenance that is potentially due pending provider review:  NONE    n/a    Is there anyone who you would like to be able to receive your results? No  If yes have patient fill out SINDY    Denise Vasquez CMA    "

## 2019-02-22 NOTE — PROGRESS NOTES
SUBJECTIVE:   Chepe Elkins is a 53 year old male who presents to clinic today for the following health issues:  Chief Complaint   Patient presents with     Diabetes      No side effects noted with empagliflozin.    Diabetes Follow-up    Patient is checking blood sugars: once daily usually AM.  Results are as follows:Glucometer range within the past  Month=         160-205    He has been on metformin 1000mg twice daily.     Taking empagliflozin 10mg once daily.   Lab Results   Component Value Date    A1C 10.5 02/22/2019       Diabetic concerns: None     Symptoms of hypoglycemia (low blood sugar): none     Paresthesias (numbness or burning in feet) or sores: No     Date of last diabetic eye exam: Due    Diabetes Management Resources    Hyperlipidemia Follow-Up      Rate your low fat/cholesterol diet?: not monitoring fat    Taking statin?  No    Other lipid medications/supplements?:  none    Hypertension Follow-up      Outpatient blood pressures are not being checked.    Low Salt Diet: not monitoring salt    BP Readings from Last 2 Encounters:   10/26/18 136/88   04/27/18 132/88     Hemoglobin A1C (%)   Date Value   10/26/2018 10.5 (H)   03/19/2018 12.5 (H)     LDL Cholesterol Calculated (mg/dL)   Date Value   02/27/2018 93       Amount of exercise or physical activity: None    Problems taking medications regularly: No    Medication side effects: none    Diet: regular (no restrictions)    Last clinic visit:10/26/2018  Medication or other changes at last visit:added empagliflozin   Weight since last visit? Up 9#  Wt Readings from Last 5 Encounters:   02/22/19 146 kg (321 lb 12.8 oz)   10/26/18 141.5 kg (312 lb)   04/27/18 142.4 kg (314 lb)   03/28/18 145.6 kg (321 lb)   03/26/18 146.5 kg (323 lb)      History   Smoking Status     Former Smoker     Packs/day: 1.00     Years: 25.00     Types: Cigarettes     Quit date: 2/26/2010   Smokeless Tobacco     Never Used     Comment: started at age 19       Past medical  history reviewed and updated    Patient Active Problem List    Diagnosis Date Noted     Hyperlipidemia LDL goal <100 02/22/2019     Priority: Medium     Acute deep vein thrombosis (DVT) of popliteal vein of right lower extremity (H) 03/28/2018     Priority: Medium     3/28/2018:Unprovoked.  PLAN: indefinite anticoagulation.        Osteoarthritis of both knees, unspecified osteoarthritis type 03/28/2018     Priority: Medium     Type 2 diabetes mellitus without complication, without long-term current use of insulin (H) 03/19/2018     Priority: Medium     Morbid obesity (H) 03/19/2018     Priority: Medium     Current Outpatient Medications   Medication Sig Dispense Refill     blood glucose (NO BRAND SPECIFIED) lancets standard Use to test blood sugar 2-3 times daily or as directed. 100 each 3     blood glucose monitoring (NO BRAND SPECIFIED) meter device kit Use to test blood sugar 2-3 times daily or as directed. 1 kit 0     blood glucose monitoring (NO BRAND SPECIFIED) test strip Use to test blood sugar 2-3 times daily or as directed. 100 strip 11     empagliflozin (JARDIANCE) 10 MG TABS tablet Take 1 tablet (10 mg) by mouth daily 30 tablet 11     metFORMIN (GLUCOPHAGE) 1000 MG tablet Take 1 tablet (1,000 mg) by mouth 2 times daily (with meals) 60 tablet 11     Multiple Vitamin (MULTI VITAMIN MENS PO) Take 1 tablet by mouth daily       rivaroxaban ANTICOAGULANT (XARELTO) 20 MG TABS tablet Take 1 tablet (20 mg) by mouth daily (with dinner) Start after completing the 15mg twice daily dose for the first 21 days. 30 tablet 11     sildenafil (REVATIO) 20 MG tablet Sidenafil (Viagra) comes in 20mg strength pills. Take as many pills as needed up to maximum of 5 pills at a time prior to intercourse. 50 tablet 11     ROS:General: No change in weight, sleep or appetite.  Normal energy.  No fever or chills  Resp: No coughing, wheezing or shortness of breath  CV: No chest pains or palpitations  GI: POSITIVE for:, nausea, if not  "taking empagliflozin with food.   : POSITIVE for:, urinary frequency, not much change with the empagliflozin.   Musculoskeletal: POSITIVE  for:, pain right knee.  Limits exercise.  Cortisone helped some.    Psychiatric: No problems with anxiety, depression or mental health    OBJECTIVE:Blood pressure 130/86, pulse 100, temperature 99.3  F (37.4  C), temperature source Tympanic, resp. rate 16, height 1.854 m (6' 1\"), weight 146 kg (321 lb 12.8 oz). BMI=Body mass index is 42.46 kg/m .  GENERAL APPEARANCE ADULT: Alert, no acute distress, morbidly obese   Foot exam:not done     ASSESSMENT/PLAN:                                                    Lifestyle recommendations:keep working on losing weight (ideal BMI-body mass index is <25)  Diabetic recommendations:-return for follow-up visit in 3 month(s)    (E11.9) Type 2 diabetes mellitus without complication, without long-term current use of insulin (H)  (primary encounter diagnosis)  Comment: not well controlled.  Unchanged  Plan: Hemoglobin A1c, Basic metabolic panel  (Ca, Cl,        CO2, Creat, Gluc, K, Na, BUN), Albumin Random         Urine Quantitative with Creat Ratio,         empagliflozin (JARDIANCE) 25 MG TABS tablet,         metFORMIN (GLUCOPHAGE) 1000 MG tablet          Increase the empagliflozin to 25mg daily.  You can take two of the 10mg pills ddaily until gone.   Check glucometers at different times of the day.  Before lunch or supper, bedtime, 1-2 hours after you eat.  If your readings remain high, we will need to add another medication.  You may need insulin if not making improvement.     Weight loss is the single biggest treatment for the diabetes mellitus.      (E78.5) Hyperlipidemia LDL goal <100  Comment:   Plan: Lipid panel reflex to direct LDL Fasting        Non-fasting blood tests.     (I82.431) Acute deep vein thrombosis (DVT) of popliteal vein of right lower extremity (H)  Comment: stable  Plan: rivaroxaban ANTICOAGULANT (XARELTO) 20 MG TABS    "      tablet        Refills.     (N52.9) Erectile dysfunction, unspecified erectile dysfunction type  Comment:   Plan: sildenafil (REVATIO) 20 MG tablet        Refills.     (M25.561,  G89.29) Chronic pain of right knee  Comment:   Plan: ORTHO  REFERRAL        Schedule an appointment with orthopedics.     (E66.01) Morbid obesity (H)  Comment:   Plan: NUTRITION REFERRAL        Schedule an appointment with dietician.         Diabetes Type II, A1c Uncontrolled, non-insulin dependent   Associated with the following complications:none

## 2019-02-22 NOTE — PATIENT INSTRUCTIONS
ASSESSMENT/PLAN:                                                    Lifestyle recommendations:keep working on losing weight (ideal BMI-body mass index is <25)  Diabetic recommendations:-return for follow-up visit in 3 month(s)    (E11.9) Type 2 diabetes mellitus without complication, without long-term current use of insulin (H)  (primary encounter diagnosis)  Comment: not well controlled.  Unchanged  Plan: Hemoglobin A1c, Basic metabolic panel  (Ca, Cl,        CO2, Creat, Gluc, K, Na, BUN), Albumin Random         Urine Quantitative with Creat Ratio,         empagliflozin (JARDIANCE) 25 MG TABS tablet,         metFORMIN (GLUCOPHAGE) 1000 MG tablet          Increase the empagliflozin to 25mg daily.  You can take two of the 10mg pills ddaily until gone.   Check glucometers at different times of the day.  Before lunch or supper, bedtime, 1-2 hours after you eat.  If your readings remain high, we will need to add another medication.  You may need insulin if not making improvement.     Weight loss is the single biggest treatment for the diabetes mellitus.      (E78.5) Hyperlipidemia LDL goal <100  Comment:   Plan: Lipid panel reflex to direct LDL Fasting        Non-fasting blood tests.     (I82.431) Acute deep vein thrombosis (DVT) of popliteal vein of right lower extremity (H)  Comment: stable  Plan: rivaroxaban ANTICOAGULANT (XARELTO) 20 MG TABS         tablet        Refills.     (N52.9) Erectile dysfunction, unspecified erectile dysfunction type  Comment:   Plan: sildenafil (REVATIO) 20 MG tablet        Refills.     (M25.561,  G89.29) Chronic pain of right knee  Comment:   Plan: ORTHO  REFERRAL        Schedule an appointment with orthopedics.     (E66.01) Morbid obesity (H)  Comment:   Plan: NUTRITION REFERRAL        Schedule an appointment with dietician.

## 2019-02-24 NOTE — RESULT ENCOUNTER NOTE
"Please call.  See if he is willing to take atorvastatin.    He will need future Hgb A1C and fasting lipid panel orders.     Hi Chepe,  Urine tests show positive MAC (microalbumin/creatinine ratio) meaning protein leaking from the kidneys into the urine. This is a sign of some kidney damage from the diabetes mellitus.  It has been present in the past.  Triglycerides, a type of fat found in the bloodstream is high.  HDL (\"good cholesterol\") is low.    LDL is borderline.   The blood chemistries (Basic metabolic panel) are all normal including electrolytes (salt balances in the blood) and kidney tests with the exception of glucose which is high.     Hgb A1C still very high as we discussed.   PLAN:   Increase the Jardiance strength as we discussed.  Continue the metformin.   Recheck in 3 months.   There is a significant risk for heart attacks and strokes.   I recommend adding a cholesterol lowering medication to lower the chances of heart attacks and strokes.    I suggest starting atorvastatin at 20mg once daily.   I will have my staff contact you to see about starting this medication.   If willing to add this medication, we can do prescription for #30 with 11 refills.    Also important for anyone with high cholesterol is weight loss, regular exercise with goal of 30 minutes most days of the week and eating a prudent diet (lots of fruits and vegetables, limiting unhealthy fats and excessive calories).  I recommend fasting lipid profile blood test in 2-3 months to see how well medication is working.    ROSIE ORTEGA MD     "

## 2019-02-28 ENCOUNTER — TELEPHONE (OUTPATIENT)
Dept: FAMILY MEDICINE | Facility: CLINIC | Age: 54
End: 2019-02-28

## 2019-02-28 DIAGNOSIS — E11.9 TYPE 2 DIABETES MELLITUS WITHOUT COMPLICATION, WITHOUT LONG-TERM CURRENT USE OF INSULIN (H): ICD-10-CM

## 2019-02-28 DIAGNOSIS — E78.5 HYPERLIPIDEMIA LDL GOAL <100: Primary | ICD-10-CM

## 2019-02-28 RX ORDER — ATORVASTATIN CALCIUM 20 MG/1
20 TABLET, FILM COATED ORAL DAILY
Qty: 90 TABLET | Refills: 3 | Status: SHIPPED | OUTPATIENT
Start: 2019-02-28 | End: 2020-02-18

## 2019-02-28 NOTE — TELEPHONE ENCOUNTER
PLAN:   Increase the Jardiance strength as we discussed.  Continue the metformin.   Recheck in 3 months.   There is a significant risk for heart attacks and strokes.   I recommend adding a cholesterol lowering medication to lower the chances of heart attacks and strokes.    I suggest starting atorvastatin at 20mg once daily.   I will have my staff contact you to see about starting this medication.   If willing to add this medication, we can do prescription for #30 with 11 refills.    Also important for anyone with high cholesterol is weight loss, regular exercise with goal of 30 minutes most days of the week and eating a prudent diet (lots of fruits and vegetables, limiting unhealthy fats and excessive calories).  I recommend fasting lipid profile blood test in 2-3 months to see how well medication is working.    ROSIE ORTEGA MD     Patient notified and states understands results and advice. Orders in epic. Rx sent to pharmacy  Trinidad Vale CMA

## 2019-02-28 NOTE — LETTER
[facility]  [address] [place] [id number]  [phone]        June 5, 2019    Chepe Elkins  [address] [facility] [address]              Dear Chepe Elkins    This is to remind you that your provider wanted you to return to the clinic for fasting lab test(s),    please fast for 10-12 hours. Morning medications can be taken with water.    You may call our office at [facility] at [phone] to schedule an appointment.    Please disregard this notice if you have already had your labs drawn or made an appointment.          Sincerely,        Bean Eduardo MD/ jess

## 2019-03-05 ENCOUNTER — TRANSFERRED RECORDS (OUTPATIENT)
Dept: HEALTH INFORMATION MANAGEMENT | Facility: CLINIC | Age: 54
End: 2019-03-05

## 2019-05-02 ENCOUNTER — OFFICE VISIT (OUTPATIENT)
Dept: FAMILY MEDICINE | Facility: CLINIC | Age: 54
End: 2019-05-02
Payer: COMMERCIAL

## 2019-05-02 VITALS
SYSTOLIC BLOOD PRESSURE: 124 MMHG | RESPIRATION RATE: 16 BRPM | BODY MASS INDEX: 41.43 KG/M2 | HEART RATE: 64 BPM | DIASTOLIC BLOOD PRESSURE: 82 MMHG | WEIGHT: 314 LBS

## 2019-05-02 DIAGNOSIS — M17.11 PRIMARY OSTEOARTHRITIS OF RIGHT KNEE: Primary | ICD-10-CM

## 2019-05-02 PROCEDURE — 20610 DRAIN/INJ JOINT/BURSA W/O US: CPT | Mod: RT | Performed by: FAMILY MEDICINE

## 2019-05-02 RX ORDER — TRIAMCINOLONE ACETONIDE 40 MG/ML
40 INJECTION, SUSPENSION INTRA-ARTICULAR; INTRAMUSCULAR ONCE
Status: COMPLETED | OUTPATIENT
Start: 2019-05-02 | End: 2019-05-02

## 2019-05-02 RX ADMIN — TRIAMCINOLONE ACETONIDE 40 MG: 40 INJECTION, SUSPENSION INTRA-ARTICULAR; INTRAMUSCULAR at 10:39

## 2019-05-02 NOTE — NURSING NOTE
"Chief Complaint   Patient presents with     Musculoskeletal Problem     Knee Pain       Initial /82 (BP Location: Right arm, Patient Position: Chair, Cuff Size: Adult Large)   Pulse 64   Resp 16   Wt 142.4 kg (314 lb)   BMI 41.43 kg/m   Estimated body mass index is 41.43 kg/m  as calculated from the following:    Height as of 2/22/19: 1.854 m (6' 1\").    Weight as of this encounter: 142.4 kg (314 lb).    Patient presents to the clinic using No DME    Health Maintenance that is potentially due pending provider review:  FIT - pt informed    Pina Jackson MA  9:57 AM 5/2/2019  .        "

## 2019-05-02 NOTE — PROGRESS NOTES
SUBJECTIVE:   Chepe Elkins is a 53 year old male who presents to clinic today for the following   health issues:      1.) Knee Pain  - Right knee worse  - Has gotten injections in the past, last injections 4/27/18  - left knee still good, but right knee has started to worse in the last month.     S:. Chepe Elkins is a 53 year old male with R knee pain.  Injections before.  Wants another    No new injury.  No redness,swelling or illness    O:/82 (BP Location: Right arm, Patient Position: Chair, Cuff Size: Adult Large)   Pulse 64   Resp 16   Wt 142.4 kg (314 lb)   BMI 41.43 kg/m    R knee with no swelling/redness.  Some medial joint line pain    With his permission, 4cc marcaine with 40mg kenalog lateral joint line R knee    A: R knee djd    P: f/u prn

## 2019-05-02 NOTE — NURSING NOTE
Prior to injection, verified patient identity using patient's name and date of birth.  Due to injection administration, patient instructed to remain in clinic for 15 minutes  afterwards, and to report any adverse reaction to me immediately.    Bursa injection was performed.      Drug Amount Wasted:  None.  Vial/Syringe: Single dose vial  Expiration Date:  11/2020    Mixed with 4 ml 0.5% Marcaine   Drawn and given by Dr. Sun.     Pina Jackson MA  10:40 AM 5/2/2019

## 2019-05-06 ENCOUNTER — TELEPHONE (OUTPATIENT)
Dept: FAMILY MEDICINE | Facility: CLINIC | Age: 54
End: 2019-05-06

## 2019-05-06 NOTE — TELEPHONE ENCOUNTER
Reason for Call:  Other     Detailed comments: Patient had a melody shot on Thursday and he is having more pain then before the shot - please call pt    Phone Number Patient can be reached at: Home number on file [phone] (home)    Best Time:     Can we leave a detailed message on this number? YES    Call taken on 5/6/2019 at 2:27 PM by Eliza Espinoza

## 2019-05-06 NOTE — TELEPHONE ENCOUNTER
Pt called denies s/s of infection. States ice makes it feel worse. Can't take ibuprofen, has been taking tylenol frequently. Appointment made for tomorrow. Leny Montgomery RN

## 2019-05-07 ENCOUNTER — OFFICE VISIT (OUTPATIENT)
Dept: FAMILY MEDICINE | Facility: CLINIC | Age: 54
End: 2019-05-07
Payer: COMMERCIAL

## 2019-05-07 ENCOUNTER — ANCILLARY PROCEDURE (OUTPATIENT)
Dept: GENERAL RADIOLOGY | Facility: CLINIC | Age: 54
End: 2019-05-07
Attending: NURSE PRACTITIONER
Payer: COMMERCIAL

## 2019-05-07 VITALS
HEIGHT: 73 IN | DIASTOLIC BLOOD PRESSURE: 84 MMHG | RESPIRATION RATE: 18 BRPM | SYSTOLIC BLOOD PRESSURE: 136 MMHG | TEMPERATURE: 98.4 F | BODY MASS INDEX: 41.08 KG/M2 | WEIGHT: 310 LBS | HEART RATE: 72 BPM

## 2019-05-07 DIAGNOSIS — M17.11 PRIMARY OSTEOARTHRITIS OF RIGHT KNEE: Primary | ICD-10-CM

## 2019-05-07 DIAGNOSIS — M17.11 PRIMARY OSTEOARTHRITIS OF RIGHT KNEE: ICD-10-CM

## 2019-05-07 DIAGNOSIS — S83.241A TEAR OF MEDIAL MENISCUS OF RIGHT KNEE, CURRENT, UNSPECIFIED TEAR TYPE, INITIAL ENCOUNTER: ICD-10-CM

## 2019-05-07 PROCEDURE — 99214 OFFICE O/P EST MOD 30 MIN: CPT | Performed by: NURSE PRACTITIONER

## 2019-05-07 PROCEDURE — 73565 X-RAY EXAM OF KNEES: CPT

## 2019-05-07 RX ORDER — HYDROCODONE BITARTRATE AND ACETAMINOPHEN 5; 325 MG/1; MG/1
1 TABLET ORAL EVERY 12 HOURS PRN
Qty: 30 TABLET | Refills: 0 | Status: SHIPPED | OUTPATIENT
Start: 2019-05-07 | End: 2019-05-10

## 2019-05-07 ASSESSMENT — MIFFLIN-ST. JEOR: SCORE: 2315.03

## 2019-05-07 NOTE — PROGRESS NOTES
SUBJECTIVE:   Chepe Elkins is a 53 year old male who presents to clinic today for the following   health issues:    Patient is here to follow up with her knee injection. He states that his right knee pain is worse after getting a cortisone injection last week.      Additional history: as documented    Reviewed  and updated as needed this visit by clinical staff         Reviewed and updated as needed this visit by Provider         Patient Active Problem List   Diagnosis     Type 2 diabetes mellitus without complication, without long-term current use of insulin (H)     Morbid obesity (H)     Acute deep vein thrombosis (DVT) of popliteal vein of right lower extremity (H)     Osteoarthritis of both knees, unspecified osteoarthritis type     Hyperlipidemia LDL goal <100     History reviewed. No pertinent surgical history.    Social History     Tobacco Use     Smoking status: Former Smoker     Packs/day: 1.00     Years: 25.00     Pack years: 25.00     Types: Cigarettes     Last attempt to quit: 2010     Years since quittin.1     Smokeless tobacco: Never Used     Tobacco comment: started at age 19   Substance Use Topics     Alcohol use: Yes     Comment: rarely     Family History   Problem Relation Age of Onset     Diabetes Mother      Prostate Cancer No family hx of      Colon Cancer No family hx of          Current Outpatient Medications   Medication Sig Dispense Refill     atorvastatin (LIPITOR) 20 MG tablet Take 1 tablet (20 mg) by mouth daily 90 tablet 3     blood glucose (NO BRAND SPECIFIED) lancets standard Use to test blood sugar 2-3 times daily or as directed. 100 each 3     blood glucose monitoring (NO BRAND SPECIFIED) meter device kit Use to test blood sugar 2-3 times daily or as directed. 1 kit 0     blood glucose monitoring (NO BRAND SPECIFIED) test strip Use to test blood sugar 2-3 times daily or as directed. 100 strip 11     empagliflozin (JARDIANCE) 25 MG TABS tablet Take 1 tablet (25 mg) by mouth  "daily 90 tablet 3     metFORMIN (GLUCOPHAGE) 1000 MG tablet Take 1 tablet (1,000 mg) by mouth 2 times daily (with meals) 60 tablet 11     Multiple Vitamin (MULTI VITAMIN MENS PO) Take 1 tablet by mouth daily       rivaroxaban ANTICOAGULANT (XARELTO) 20 MG TABS tablet Take 1 tablet (20 mg) by mouth daily (with dinner) Start after completing the 15mg twice daily dose for the first 21 days. 30 tablet 11     sildenafil (REVATIO) 20 MG tablet Sidenafil (Viagra) comes in 20mg strength pills. Take as many pills as needed up to maximum of 5 pills at a time prior to intercourse. 50 tablet 11     No Known Allergies  Recent Labs   Lab Test 02/22/19  1609 10/26/18  1633 03/19/18  1953 02/27/18  0817   A1C 10.5* 10.5* 12.5*  --    LDL Cannot estimate LDL when triglyceride exceeds 400 mg/dL  100*  --   --  93   HDL 31*  --   --  34*   TRIG 482*  --   --  271*   CR 0.74  --   --  0.64*   GFRESTIMATED >90  --   --  >90   GFRESTBLACK >90  --   --  >90   POTASSIUM 4.1  --   --  4.5   TSH  --   --  3.77  --       BP Readings from Last 3 Encounters:   05/07/19 136/84   05/02/19 124/82   02/22/19 130/86    Wt Readings from Last 3 Encounters:   05/07/19 140.6 kg (310 lb)   05/02/19 142.4 kg (314 lb)   02/22/19 146 kg (321 lb 12.8 oz)                    ROS:  Constitutional, HEENT, cardiovascular, pulmonary, gi and gu systems are negative, except as otherwise noted.    OBJECTIVE:     /84 (BP Location: Right arm, Cuff Size: Adult Large)   Pulse 72   Temp 98.4  F (36.9  C) (Tympanic)   Resp 18   Ht 1.854 m (6' 1\")   Wt 140.6 kg (310 lb)   BMI 40.90 kg/m    Body mass index is 40.9 kg/m .  GENERAL: healthy, alert and no distress  EYES: Eyes grossly normal to inspection, PERRL and conjunctivae and sclerae normal  NECK: no adenopathy, no asymmetry, masses, or scars and thyroid normal to palpation  RESP: lungs clear to auscultation - no rales, rhonchi or wheezes  CV: regular rate and rhythm, normal S1 S2, no S3 or S4, no murmur, " click or rub, no peripheral edema and peripheral pulses strong  MS: no gross musculoskeletal defects noted, no edema  SKIN: no suspicious lesions or rashes  NEURO: Normal strength and tone, mentation intact and speech normal  PSYCH: mentation appears normal, affect normal/bright    Inspection: No effusion, No quad atrophy  Tender: medial patellar facet, medial joint line, medial tibial plateau, medial femoral condyle MCL  Non-tender: lateral patellar facet, LCL, lateral joint line, lateral tibial plateau, lateral femoral condyle  Active Range of Motion: pain with flexion, pain with extension  Special tests: normal Valgus stress test, positive Valgus stress test, positive Lachman's test, negative pivot shift    Also examined: hip full range of motion  Diagnostic Test Results:  none   KNEE AP STANDING BILATERAL May 7, 2019 10:37 AM      HISTORY: Continued knee pain. Primary osteoarthritis of right knee.                                                                      IMPRESSION: Moderate right and mild left knee medial compartment joint  space narrowing.     MRI                                                                  IMPRESSION:   1. Large horizontal tear of the posterior horn of the medial meniscus.  2. Moderate chondromalacia of the medial compartment with mild  chondromalacia patella.  ASSESSMENT/PLAN:   (M17.11) Primary osteoarthritis of right knee  (primary encounter diagnosis)  Comment:  patient's x-ray does show moderate osteoarthritis patient has known osteoarthritis of the knee due to severity of pain and not improving will obtain MRI  Plan: XR Knee AP Standing Bilateral, MR Knee Right         w/o Contrast, PHYSICAL THERAPY REFERRAL, order         for DME, HYDROcodone-acetaminophen (NORCO)         5-325 MG tablet      (S83.241A) Tear of medial meniscus of right knee, current, unspecified tear type, initial encounter  Comment: Patient's MRI does show tear.  Will have patient follow-up with  orthopedics patient was given an order for a brace that he should to obtain at the home health store in [place] due to unable to have the size here  Plan: ORTHO  REFERRAL          ENMANUEL Lozoya CNP  [facility]

## 2019-05-07 NOTE — PATIENT INSTRUCTIONS
Patient Education     Understanding Osteoarthritis of the Knee    A joint is a place where two bones meet. The knee is called a hinge joint. This joint is formed where the thighbone (femur) meets the shinbone (tibia). A healthy knee joint bends freely. Knee osteoarthritis is a condition where parts of the knee joint wear out. This can lead to pain, stiffness, and limited movement.   What is osteoarthritis?  Every joint contains a smooth tissue called cartilage. Cartilage cushions the ends of bones and helps bones in a joint glide smoothly against each other. Knee osteoarthritis occurs when cartilage in the knee joint begins to break down and wear away. Bones may become exposed and rub together. The cartilage may become irritated and rough. This prevents smooth movement of the joint and can lead to pain.  Causes of osteoarthritis of the knee  Causes can include:    Wear and tear from normal use over time    Overuse of the knee during sports or work activities    Being overweight. This increases stress on the knee joint.    Misalignment of the knee joint    Injury to the knee  Symptoms of osteoarthritis of the knee  Common symptoms include:    Pain and swelling around the joint. The pain and swelling get worse with activity and better with rest.    Grinding sound when moving the knee    Reduced knee movement    Knee stiffness. This is often worse first thing in the morning.  Treating osteoarthritis of the knee  Osteoarthritis is a long-term condition. Treatment usually focuses on managing symptoms. Treatment may include:    Over-the-counter or prescription medicines taken by mouth to help relieve pain and swelling    Injections of medicine into the joint to help relieve symptoms for a time    A weight-loss plan for people who are overweight    A plan of physical therapy and exercises to improve the strength and flexibility of the muscles around the knee    Heat or cold therapy to help relieve pain and stiffness     Assistive devices that help with movement, such as a cane or a walker    Assistive devices that make activities of daily life easier, such as raised toilet seats or shower bars  If other treatments don t do enough to relieve symptoms, you may need surgery to replace the joint. During this surgery, the damaged joint is removed. An artificial knee joint is then put into place. This can help relieve pain and stiffness and restore movement of the knee.     When to call your healthcare provider  Call your healthcare provider right away if you have any of these:    Fever of 100.4 F (38 C) or higher, or as directed    Symptoms that don t get better with prescribed medicines or get worse    New symptoms   Date Last Reviewed: 3/10/2016    0141-9314 [employer]. [address] [place] [address], [place] [address]. All rights reserved. This information is not intended as a substitute for professional medical care. Always follow your healthcare professional's instructions.           Patient Education     Osteoarthritis  Osteoarthritis (also called degenerative joint disease) happens when the cartilage in a joint becomes damaged and worn. This may be due to age, wear and tear, overuse of the joint, or other problems. Osteoarthritis can affect any joint. But it is most common in hands, knees, spine, hips, and feet. Symptoms include joint stiffness, pain, and swelling.  Home care    When a joint is more sore than usual, rest it for a day or two.    Heat can help relieve stiffness. Take a hot bath or apply a heating pad for up to 30 minutes at a time. If symptoms are worse in the morning, using heat just after awakening can help relax the muscle and soothe the joints.     Ice helps relieve pain and swelling. It is often used after activity. Use a cold pack wrapped in a thin cloth on the joint for 10 to 15 minutes at a time.     Alternating hot and cold can also help relieve pain. Try this for 20 minutes at a time, several times  per day.    Exercise helps prevent the muscles and ligaments around the joint from becoming weak. It also helps maintain function in the joint.  Be as active as you can. Talk to your healthcare provider about what activity program is best for you.    Excess weight puts a lot of extra strain on weight-bearing joints of the lower back, hips, knees, feet and ankles. If you are overweight, talk to your healthcare provider about a safe and effective weight loss program.    Use anti-inflammatory medicines as prescribed for pain. This includes acetaminophen or NSAIDs such as ibuprofen or naproxen. If needed, topical or injected medicines may be recommended. Talk to your healthcare provider if these options are not enough to manage your pain.    Talk with your healthcare provider about devices that might help improve your function and reduce pain.  Follow-up care  Follow up with your healthcare provider as advised by our staff.  When to seek medical advice  Call your healthcare provider right away if any of these occur:    Redness or swelling of a painful joint    Discharge or pus from a painful joint    Fever of 100.4 F (38 C) or higher, or as directed by your healthcare provider    Worsening joint pain    Decreased ability to move the joint or bear weight on the joint  Date Last Reviewed: 3/1/2017    2482-5798 [employer]. [address] [place] [address]. All rights reserved. This information is not intended as a substitute for professional medical care. Always follow your healthcare professional's instructions.           Patient Education     Osteoarthritis: Injections and Surgery     Talk with your healthcare provider about your treatment options.     Injections or surgery may help if you have pain or movement problems that severely limit your activities. Your healthcare provider can tell you more about these treatment choices and their risks and complications.  Injections  Medicine can be  injected directly into the affected joint. These shots take a few minutes and are done in your healthcare provider s office:    Corticosteroid or steroid injections may ease swelling and pain. The medicine is injected into the joint for example, the knee or hip. Steroid injections do have risks, so healthcare providers limit the number of injections used in any one joint.     Lubricant supplementation injections use hyaluronic acid, a substance similar to one found naturally in the joint. It may help the joint work more smoothly. These injections are only for osteoarthritis in the knees.  Surgery  Choices for surgery include:    Arthroscopy. The surgeon looks at and works inside the joint using special tiny tools put through very small incisions. The cartilage is smoothed. Any pieces of cartilage that have broken off are removed.    Total joint replacement. The entire joint is taken out and replaced with a manmade joint using metal, ceramic, or plastic. This is most often done with the knee or hip joint.    Other surgery. There are other surgical procedures specific to certain joints. For example, joint resurfacing may be done on the hip joint.  Date Last Reviewed: 6/1/2018 2000-2018 [employer]. [address] [place] [address]. All rights reserved. This information is not intended as a substitute for professional medical care. Always follow your healthcare professional's instructions.           Patient Education     Osteoarthritis: Tips for Daily Living     Lift items with both hands.   Making a few changes in your daily life can reduce stress on your joints. This helps protect the joints from further damage.  Your surroundings  Make your home work for you:    Arrange cupboards, closets, desks, and drawers to reduce reaching and bending.    Arrange furniture to make it safer and easier to get around.    Secure or remove rugs, power cords, and other items that might make you slip or trip.   "Think ahead  Plan in advance:    Combine errands so that you make fewer trips up and down stairs.    Break up packages so that you carry less weight with each trip. For example, ask cashiers to use more bags for your groceries.    If you need help with chores or errands, try to arrange for it in advance.    If you need to lift something heavy, ask for help.    Try to use other parts of your body if you have pain in certain joints.  Use what s available  To rest your hands, back, and neck:    Make sure that knives are sharp.    Use a \"reacher\" or  grasper  to reach and grab.    Use soap-on-a-rope and a long-handled scrubber in the shower or bath.  To rest your knees, hips, and lower back:    Wear shoes that feel good, fit well, and provide good support.    Choose chairs with firm seats and armrests.  Assistive devices  Devices are available to help you:    In the kitchen, use light-weight dishes, cook and bakeware.    Attach larger handles to keys.    Use helpful gripping devices for opening jars.     For gardening, use a rolling bench to sit on or to hold your tools. Use tools with padded handles.    In the bathroom, try using grab bars, a raised toilet seat, or a shower seat.    A cane, brace, or walker may help you walk more easily. Make sure that it s properly fitted and that you re trained to use it.  Date Last Reviewed: 6/1/2018 2000-2018 [employer]. [address] [facility], [place] [address]. All rights reserved. This information is not intended as a substitute for professional medical care. Always follow your healthcare professional's instructions.           "

## 2019-05-08 ENCOUNTER — HOSPITAL ENCOUNTER (OUTPATIENT)
Dept: MRI IMAGING | Facility: CLINIC | Age: 54
Discharge: HOME | End: 2019-05-08
Attending: NURSE PRACTITIONER | Admitting: NURSE PRACTITIONER
Payer: COMMERCIAL

## 2019-05-08 DIAGNOSIS — M17.11 PRIMARY OSTEOARTHRITIS OF RIGHT KNEE: ICD-10-CM

## 2019-05-08 PROCEDURE — 73721 MRI JNT OF LWR EXTRE W/O DYE: CPT | Mod: RT

## 2019-05-09 ENCOUNTER — MYC MEDICAL ADVICE (OUTPATIENT)
Dept: FAMILY MEDICINE | Facility: CLINIC | Age: 54
End: 2019-05-09

## 2019-05-09 DIAGNOSIS — M17.11 PRIMARY OSTEOARTHRITIS OF RIGHT KNEE: ICD-10-CM

## 2019-05-10 RX ORDER — HYDROCODONE BITARTRATE AND ACETAMINOPHEN 5; 325 MG/1; MG/1
1 TABLET ORAL EVERY 12 HOURS PRN
Qty: 10 TABLET | Refills: 0 | Status: SHIPPED | OUTPATIENT
Start: 2019-05-10 | End: 2019-05-17

## 2019-05-14 ENCOUNTER — TRANSFERRED RECORDS (OUTPATIENT)
Dept: HEALTH INFORMATION MANAGEMENT | Facility: CLINIC | Age: 54
End: 2019-05-14

## 2019-05-17 ENCOUNTER — MYC REFILL (OUTPATIENT)
Dept: FAMILY MEDICINE | Facility: CLINIC | Age: 54
End: 2019-05-17

## 2019-05-17 DIAGNOSIS — M17.11 PRIMARY OSTEOARTHRITIS OF RIGHT KNEE: ICD-10-CM

## 2019-05-17 RX ORDER — HYDROCODONE BITARTRATE AND ACETAMINOPHEN 5; 325 MG/1; MG/1
1 TABLET ORAL EVERY 12 HOURS PRN
Qty: 10 TABLET | Refills: 0 | Status: SHIPPED | OUTPATIENT
Start: 2019-05-17 | End: 2019-08-19

## 2019-05-17 NOTE — TELEPHONE ENCOUNTER
Requested Prescriptions   Pending Prescriptions Disp Refills     HYDROcodone-acetaminophen (NORCO) 5-325 MG tablet 10 tablet 0     Sig: Take 1 tablet by mouth every 12 hours as needed for pain       There is no refill protocol information for this order      HYDROcodone-acetaminophen (NORCO) 5-325 MG tablet  Last Written Prescription Date:  05/10/2019  Last Fill Quantity: 10 tablet,  # refills: 0   Last office visit: 5/7/2019 with prescribing provider:  RUDDY Pacheco   Future Office Visit:      Tri BARNARD (VERNA) (M)

## 2019-05-17 NOTE — TELEPHONE ENCOUNTER
Please notify patient that I have sent in a refill to be filled on the 17th.  Further refills she will need to have an appointment with myself or Dr. Ojeda.    Nataly Pacheco CNP

## 2019-05-20 NOTE — TELEPHONE ENCOUNTER
Called pt, appt made for 5/23/19 with HOWIE Pacheco CNP.  He has already picked up the refill from the 17th.    Sophia TAMAYO RN

## 2019-05-23 ENCOUNTER — OFFICE VISIT (OUTPATIENT)
Dept: FAMILY MEDICINE | Facility: CLINIC | Age: 54
End: 2019-05-23
Payer: COMMERCIAL

## 2019-05-23 VITALS
TEMPERATURE: 99.4 F | HEIGHT: 73 IN | DIASTOLIC BLOOD PRESSURE: 82 MMHG | HEART RATE: 90 BPM | RESPIRATION RATE: 20 BRPM | BODY MASS INDEX: 41.17 KG/M2 | SYSTOLIC BLOOD PRESSURE: 132 MMHG | WEIGHT: 310.6 LBS

## 2019-05-23 DIAGNOSIS — Z12.11 SPECIAL SCREENING FOR MALIGNANT NEOPLASMS, COLON: ICD-10-CM

## 2019-05-23 DIAGNOSIS — S83.241A TEAR OF MEDIAL MENISCUS OF RIGHT KNEE, CURRENT, UNSPECIFIED TEAR TYPE, INITIAL ENCOUNTER: ICD-10-CM

## 2019-05-23 DIAGNOSIS — M17.11 PRIMARY OSTEOARTHRITIS OF RIGHT KNEE: Primary | ICD-10-CM

## 2019-05-23 PROCEDURE — 99214 OFFICE O/P EST MOD 30 MIN: CPT | Performed by: NURSE PRACTITIONER

## 2019-05-23 RX ORDER — HYDROCODONE BITARTRATE AND ACETAMINOPHEN 7.5; 325 MG/1; MG/1
1 TABLET ORAL EVERY 6 HOURS PRN
Qty: 90 TABLET | Refills: 0 | Status: SHIPPED | OUTPATIENT
Start: 2019-05-23 | End: 2019-06-19

## 2019-05-23 ASSESSMENT — MIFFLIN-ST. JEOR: SCORE: 2317.75

## 2019-05-23 ASSESSMENT — PAIN SCALES - GENERAL: PAINLEVEL: WORST PAIN (10)

## 2019-05-23 NOTE — NURSING NOTE
"Chief Complaint   Patient presents with     Knee Pain       Initial /82 (BP Location: Right arm, Patient Position: Sitting, Cuff Size: Adult Large)   Pulse 90   Temp 99.4  F (37.4  C) (Tympanic)   Resp 20   Ht 1.854 m (6' 1\")   Wt 140.9 kg (310 lb 9.6 oz)   BMI 40.98 kg/m   Estimated body mass index is 40.98 kg/m  as calculated from the following:    Height as of this encounter: 1.854 m (6' 1\").    Weight as of this encounter: 140.9 kg (310 lb 9.6 oz).    Patient presents to the clinic using No DME    Health Maintenance that is potentially due pending provider review:  Colonoscopy/FIT    Gave pt phone number/pended order to schedule mammo and/or colonoscopy(or FIT)    Is there anyone who you would like to be able to receive your results? No  If yes have patient fill out SINDY    Denise Vasquez CMA    "

## 2019-05-23 NOTE — PROGRESS NOTES
Subjective     Chepe Elkins is a 53 year old male who presents to clinic today for the following health issues:    HPI   Musculoskeletal problem/pain      Duration: Years     Description  Location: right knee     Intensity:  severe    Accompanying signs and symptoms: none    History  Previous similar problem: no   Previous evaluation:  x-ray and MRI    Precipitating or alleviating factors:  Trauma or overuse: YES  Aggravating factors include: overuse    Therapies tried and outcome: norco      Patient Active Problem List   Diagnosis     Type 2 diabetes mellitus without complication, without long-term current use of insulin (H)     Morbid obesity (H)     Acute deep vein thrombosis (DVT) of popliteal vein of right lower extremity (H)     Osteoarthritis of both knees, unspecified osteoarthritis type     Hyperlipidemia LDL goal <100     History reviewed. No pertinent surgical history.    Social History     Tobacco Use     Smoking status: Former Smoker     Packs/day: 1.00     Years: 25.00     Pack years: 25.00     Types: Cigarettes     Last attempt to quit: 2010     Years since quittin.2     Smokeless tobacco: Never Used     Tobacco comment: started at age 19   Substance Use Topics     Alcohol use: Yes     Comment: rarely     Family History   Problem Relation Age of Onset     Diabetes Mother      Prostate Cancer No family hx of      Colon Cancer No family hx of          Current Outpatient Medications   Medication Sig Dispense Refill     atorvastatin (LIPITOR) 20 MG tablet Take 1 tablet (20 mg) by mouth daily 90 tablet 3     blood glucose (NO BRAND SPECIFIED) lancets standard Use to test blood sugar 2-3 times daily or as directed. 100 each 3     blood glucose monitoring (NO BRAND SPECIFIED) meter device kit Use to test blood sugar 2-3 times daily or as directed. 1 kit 0     blood glucose monitoring (NO BRAND SPECIFIED) test strip Use to test blood sugar 2-3 times daily or as directed. 100 strip 11      empagliflozin (JARDIANCE) 25 MG TABS tablet Take 1 tablet (25 mg) by mouth daily 90 tablet 3     HYDROcodone-acetaminophen (NORCO) 5-325 MG tablet Take 1 tablet by mouth every 12 hours as needed for pain 10 tablet 0     metFORMIN (GLUCOPHAGE) 1000 MG tablet Take 1 tablet (1,000 mg) by mouth 2 times daily (with meals) 60 tablet 11     Multiple Vitamin (MULTI VITAMIN MENS PO) Take 1 tablet by mouth daily       order for DME Equipment being ordered: knee brace true pull 1 Units 0     rivaroxaban ANTICOAGULANT (XARELTO) 20 MG TABS tablet Take 1 tablet (20 mg) by mouth daily (with dinner) Start after completing the 15mg twice daily dose for the first 21 days. 30 tablet 11     sildenafil (REVATIO) 20 MG tablet Sidenafil (Viagra) comes in 20mg strength pills. Take as many pills as needed up to maximum of 5 pills at a time prior to intercourse. 50 tablet 11     No Known Allergies  Recent Labs   Lab Test 02/22/19  1609 10/26/18  1633 03/19/18  1953 02/27/18  0817   A1C 10.5* 10.5* 12.5*  --    LDL Cannot estimate LDL when triglyceride exceeds 400 mg/dL  100*  --   --  93   HDL 31*  --   --  34*   TRIG 482*  --   --  271*   CR 0.74  --   --  0.64*   GFRESTIMATED >90  --   --  >90   GFRESTBLACK >90  --   --  >90   POTASSIUM 4.1  --   --  4.5   TSH  --   --  3.77  --       BP Readings from Last 3 Encounters:   05/23/19 132/82   05/07/19 136/84   05/02/19 124/82    Wt Readings from Last 3 Encounters:   05/23/19 140.9 kg (310 lb 9.6 oz)   05/07/19 140.6 kg (310 lb)   05/02/19 142.4 kg (314 lb)               Reviewed and updated as needed this visit by Provider         Review of Systems   ROS COMP: Constitutional, HEENT, cardiovascular, pulmonary, GI, , musculoskeletal, neuro, skin, endocrine and psych systems are negative, except as otherwise noted.      Objective    /82 (BP Location: Right arm, Patient Position: Sitting, Cuff Size: Adult Large)   Pulse 90   Temp 99.4  F (37.4  C) (Tympanic)   Resp 20   Ht 1.854 m  "(6' 1\")   Wt 140.9 kg (310 lb 9.6 oz)   BMI 40.98 kg/m    Body mass index is 40.98 kg/m .  Physical Exam   GENERAL: healthy, alert and no distress  EYES: Eyes grossly normal to inspection, PERRL and conjunctivae and sclerae normal  HENT: ear canals and TM's normal, nose and mouth without ulcers or lesions  NECK: no adenopathy, no asymmetry, masses, or scars and thyroid normal to palpation  RESP: lungs clear to auscultation - no rales, rhonchi or wheezes  BREAST: normal without masses, tenderness or nipple discharge and no palpable axillary masses or adenopathy  CV: regular rate and rhythm, normal S1 S2, no S3 or S4, no murmur, click or rub, no peripheral edema and peripheral pulses strong  ABDOMEN: soft, nontender, no hepatosplenomegaly, no masses and bowel sounds normal  MS: no gross musculoskeletal defects noted, no edema  SKIN: no suspicious lesions or rashes  NEURO: Normal strength and tone, mentation intact and speech normal  PSYCH: mentation appears normal, affect normal/bright    Inspection: No effusion, No quad atrophy  Tender: medial patellar facet, medial joint line, medial tibial plateau, medial femoral condyle MCL  Non-tender: lateral patellar facet, LCL, lateral joint line, lateral tibial plateau, lateral femoral condyle  Active Range of Motion: pain with flexion, pain with extension  Special tests: normal Valgus stress test, positive Valgus stress test, positive Lachman's test, negative pivot shift     Also examined: hip full range of motion    Diagnostic Test Results:  Labs reviewed in Epic        Assessment & Plan     (M17.11) Primary osteoarthritis of right knee  (primary encounter diagnosis)  Comment: Patient has osteoarthritis of the knee and a tear is going to be seen orthopedics will provide pain medications until seen by orthopedics  Plan: HYDROcodone-acetaminophen (NORCO) 7.5-325 MG         per tablet      (S83.275A) Tear of medial meniscus of right knee, current, unspecified tear type, " "initial encounter  Comment:   Plan: HYDROcodone-acetaminophen (NORCO) 7.5-325 MG         per tablet    (Z12.11) Special screening for malignant neoplasms, colon  Comment:   Plan: Fecal colorectal cancer screen (FIT), Fecal         colorectal cancer screen (FIT)       BMI:   Estimated body mass index is 40.98 kg/m  as calculated from the following:    Height as of this encounter: 1.854 m (6' 1\").    Weight as of this encounter: 140.9 kg (310 lb 9.6 oz).   Weight management plan: Discussed healthy diet and exercise guidelines        Work on weight loss  See Patient Instructions    No follow-ups on file.    ENMANUEL Lozoya [facility]      "

## 2019-05-23 NOTE — PATIENT INSTRUCTIONS
Patient Education     Understanding Meniscal Tears    The meniscus is a tough cushion of fibrous tissue called cartilage in the knee joint. It cushions the knee. It absorbs shock and helps spread weight across the knee joint. It also works with other parts of the knee to help keep the joint stable. Injury or aging can cause the meniscus to tear and lead to pain and problems using the knee.   What causes meniscal tears?  A sudden injury can tear the meniscus. This is often because of planting the foot and twisting the knee. Sports such as soccer, football, and basketball are often involved. Repeated actions, such as squatting, may also lead to a tear. Breakdown of the meniscus because of aging can also lead to tears.  Symptoms of meniscal tears  These can include:    Knee pain    Knee swelling    Catching of the knee or inability to straighten the knee    Unstable feeling in the knee  Treatment for meniscal tears  A tear is unlikely to heal on its own. You often will need surgery to repair a tear. In many cases, your healthcare provider will first try treatments to help relieve symptoms. These may include:    Rest the knee. This means avoiding any activity that puts stress on the knee joint. These include kneeling, squatting, jogging, and climbing stairs. In some cases, you may need to use crutches for a time to keep body weight off of the knee joint.    Cold pack. Putting a cold pack on the knee helps reduce pain and swelling.    Knee brace. Bracing the knee helps support it.    Medicine. Prescription and over-the-counter pain medicines can help relieve swelling and pain.    Exercises. Exercises help strengthen the muscles of the leg to help support the knee joint.  If these treatments don t help relieve symptoms or the injury is severe, you may need surgery. This can repair the meniscus to relieve symptoms and restore movement.     When to call your healthcare provider  Call your healthcare provider right away if  you have any of these:    Fever of 100.4 F (38 C) or higher, or as directed    Pain or swelling that gets worse, including pain in the calf    Numbness or tingling in leg or foot    You suddenly can t put any weight on your leg    Your knee  locks    Date Last Reviewed: 3/10/2016    6323-2876 [employer]. [address] [place] [address]. All rights reserved. This information is not intended as a substitute for professional medical care. Always follow your healthcare professional's instructions.           Patient Education     Treating Meniscus Problems  The type of surgery you have depends on the nature of your tear, its size, and location. Your surgeon may use arthroscopy. This method involves putting a tiny camera inside your knee, so that your healthcare provider can clearly see your joint. Arthroscopy requires only small incisions (cuts). You can usually go home the same day as surgery. During surgery, you may have:    Local anesthesia. Your healthcare provider numbs your knee with medicine.     A regional block. Your body is numbed from the waist down.    General anesthesia. Your healthcare provider gives you drugs to put you into a deep sleep so you do not feel pain.  Pre-op checklist    Don't eat or drink 10 hours before surgery.    Arrange for someone to drive you home after surgery.    Tell your healthcare provider if you take any medicine, supplements, or herbal remedies.    Repair  For certain tears, your surgeon will try to repair the meniscus. He or she will sew the torn edges so they can heal properly. Or your surgeon will use special fasteners to repair damage. In some cases, repairs may require another incision at the back or side of your knee.    Removal  In most cases, your surgeon will remove the damaged part of your meniscus. The meniscus won't completely grow back, so your surgeon will remove as little tissue as possible. Other tissue, called the articular cartilage, will  take over the role as shock absorber for your knee joint.    After surgery  You'll spend some time in the recovery area. You can go home when you've recovered from the anesthesia. Your knee will be bandaged. You may have stitches, steri-strips, or staples. You may need crutches to keep weight off the knee and may have a splint for support.  Date Last Reviewed: 1/1/2018 2000-2018 [employer]. [address] [place] [address]. All rights reserved. This information is not intended as a substitute for professional medical care. Always follow your healthcare professional's instructions.           Patient Education     Osteoarthritis  Osteoarthritis (also called degenerative joint disease) happens when the cartilage in a joint becomes damaged and worn. This may be due to age, wear and tear, overuse of the joint, or other problems. Osteoarthritis can affect any joint. But it is most common in hands, knees, spine, hips, and feet. Symptoms include joint stiffness, pain, and swelling.  Home care    When a joint is more sore than usual, rest it for a day or two.    Heat can help relieve stiffness. Take a hot bath or apply a heating pad for up to 30 minutes at a time. If symptoms are worse in the morning, using heat just after awakening can help relax the muscle and soothe the joints.     Ice helps relieve pain and swelling. It is often used after activity. Use a cold pack wrapped in a thin cloth on the joint for 10 to 15 minutes at a time.     Alternating hot and cold can also help relieve pain. Try this for 20 minutes at a time, several times per day.    Exercise helps prevent the muscles and ligaments around the joint from becoming weak. It also helps maintain function in the joint.  Be as active as you can. Talk to your healthcare provider about what activity program is best for you.    Excess weight puts a lot of extra strain on weight-bearing joints of the lower back, hips, knees, feet and  ankles. If you are overweight, talk to your healthcare provider about a safe and effective weight loss program.    Use anti-inflammatory medicines as prescribed for pain. This includes acetaminophen or NSAIDs such as ibuprofen or naproxen. If needed, topical or injected medicines may be recommended. Talk to your healthcare provider if these options are not enough to manage your pain.    Talk with your healthcare provider about devices that might help improve your function and reduce pain.  Follow-up care  Follow up with your healthcare provider as advised by our staff.  When to seek medical advice  Call your healthcare provider right away if any of these occur:    Redness or swelling of a painful joint    Discharge or pus from a painful joint    Fever of 100.4 F (38 C) or higher, or as directed by your healthcare provider    Worsening joint pain    Decreased ability to move the joint or bear weight on the joint  Date Last Reviewed: 3/1/2017    0339-9272 [employer]. [address] [place] [address], [place] [address]. All rights reserved. This information is not intended as a substitute for professional medical care. Always follow your healthcare professional's instructions.

## 2019-05-29 PROCEDURE — 82274 ASSAY TEST FOR BLOOD FECAL: CPT | Performed by: NURSE PRACTITIONER

## 2019-05-31 DIAGNOSIS — Z12.11 SPECIAL SCREENING FOR MALIGNANT NEOPLASMS, COLON: ICD-10-CM

## 2019-05-31 LAB — HEMOCCULT STL QL IA: NEGATIVE

## 2019-06-19 ENCOUNTER — OFFICE VISIT (OUTPATIENT)
Dept: FAMILY MEDICINE | Facility: CLINIC | Age: 54
End: 2019-06-19
Payer: COMMERCIAL

## 2019-06-19 VITALS
DIASTOLIC BLOOD PRESSURE: 80 MMHG | SYSTOLIC BLOOD PRESSURE: 136 MMHG | HEIGHT: 73 IN | TEMPERATURE: 97.4 F | WEIGHT: 309 LBS | HEART RATE: 80 BPM | BODY MASS INDEX: 40.95 KG/M2

## 2019-06-19 DIAGNOSIS — M17.11 PRIMARY OSTEOARTHRITIS OF RIGHT KNEE: ICD-10-CM

## 2019-06-19 DIAGNOSIS — S83.241A TEAR OF MEDIAL MENISCUS OF RIGHT KNEE, CURRENT, UNSPECIFIED TEAR TYPE, INITIAL ENCOUNTER: ICD-10-CM

## 2019-06-19 PROCEDURE — 99214 OFFICE O/P EST MOD 30 MIN: CPT | Performed by: NURSE PRACTITIONER

## 2019-06-19 RX ORDER — HYDROCODONE BITARTRATE AND ACETAMINOPHEN 7.5; 325 MG/1; MG/1
1 TABLET ORAL EVERY 6 HOURS PRN
Qty: 90 TABLET | Refills: 0 | Status: SHIPPED | OUTPATIENT
Start: 2019-06-19 | End: 2019-07-26

## 2019-06-19 ASSESSMENT — MIFFLIN-ST. JEOR: SCORE: 2310.49

## 2019-06-19 NOTE — PROGRESS NOTES
Subjective     Chepe Elkins is a 53 year old male who presents to clinic today for the following health issues:    HPI   Patient is here to follow up with his right knee pain. He states that the pain is bearable with his medication.      Patient Active Problem List   Diagnosis     Type 2 diabetes mellitus without complication, without long-term current use of insulin (H)     Morbid obesity (H)     Acute deep vein thrombosis (DVT) of popliteal vein of right lower extremity (H)     Osteoarthritis of both knees, unspecified osteoarthritis type     Hyperlipidemia LDL goal <100     History reviewed. No pertinent surgical history.    Social History     Tobacco Use     Smoking status: Former Smoker     Packs/day: 1.00     Years: 25.00     Pack years: 25.00     Types: Cigarettes     Last attempt to quit: 2010     Years since quittin.3     Smokeless tobacco: Never Used     Tobacco comment: started at age 19   Substance Use Topics     Alcohol use: Yes     Comment: rarely     Family History   Problem Relation Age of Onset     Diabetes Mother      Prostate Cancer No family hx of      Colon Cancer No family hx of          Current Outpatient Medications   Medication Sig Dispense Refill     atorvastatin (LIPITOR) 20 MG tablet Take 1 tablet (20 mg) by mouth daily 90 tablet 3     blood glucose (NO BRAND SPECIFIED) lancets standard Use to test blood sugar 2-3 times daily or as directed. 100 each 3     blood glucose monitoring (NO BRAND SPECIFIED) meter device kit Use to test blood sugar 2-3 times daily or as directed. 1 kit 0     blood glucose monitoring (NO BRAND SPECIFIED) test strip Use to test blood sugar 2-3 times daily or as directed. 100 strip 11     empagliflozin (JARDIANCE) 25 MG TABS tablet Take 1 tablet (25 mg) by mouth daily 90 tablet 3     HYDROcodone-acetaminophen (NORCO) 5-325 MG tablet Take 1 tablet by mouth every 12 hours as needed for pain 10 tablet 0     HYDROcodone-acetaminophen (NORCO) 7.5-325 MG per  "tablet Take 1 tablet by mouth every 6 hours as needed for severe pain 90 tablet 0     metFORMIN (GLUCOPHAGE) 1000 MG tablet Take 1 tablet (1,000 mg) by mouth 2 times daily (with meals) 60 tablet 11     Multiple Vitamin (MULTI VITAMIN MENS PO) Take 1 tablet by mouth daily       order for DME Equipment being ordered: knee brace true pull 1 Units 0     rivaroxaban ANTICOAGULANT (XARELTO) 20 MG TABS tablet Take 1 tablet (20 mg) by mouth daily (with dinner) Start after completing the 15mg twice daily dose for the first 21 days. 30 tablet 11     sildenafil (REVATIO) 20 MG tablet Sidenafil (Viagra) comes in 20mg strength pills. Take as many pills as needed up to maximum of 5 pills at a time prior to intercourse. 50 tablet 11     No Known Allergies  Recent Labs   Lab Test 02/22/19  1609 10/26/18  1633 03/19/18  1953 02/27/18  0817   A1C 10.5* 10.5* 12.5*  --    LDL Cannot estimate LDL when triglyceride exceeds 400 mg/dL  100*  --   --  93   HDL 31*  --   --  34*   TRIG 482*  --   --  271*   CR 0.74  --   --  0.64*   GFRESTIMATED >90  --   --  >90   GFRESTBLACK >90  --   --  >90   POTASSIUM 4.1  --   --  4.5   TSH  --   --  3.77  --       BP Readings from Last 3 Encounters:   06/19/19 136/80   05/23/19 132/82   05/07/19 136/84    Wt Readings from Last 3 Encounters:   06/19/19 140.2 kg (309 lb)   05/23/19 140.9 kg (310 lb 9.6 oz)   05/07/19 140.6 kg (310 lb)                  Reviewed and updated as needed this visit by Provider         Review of Systems   ROS COMP: Constitutional, HEENT, cardiovascular, pulmonary, gi and gu systems are negative, except as otherwise noted.      Objective    /80 (BP Location: Right arm, Cuff Size: Adult Large)   Pulse 80   Temp 97.4  F (36.3  C) (Tympanic)   Ht 1.854 m (6' 1\")   Wt 140.2 kg (309 lb)   BMI 40.77 kg/m    There is no height or weight on file to calculate BMI.  Physical Exam   GENERAL: healthy, alert and no distress  EYES: Eyes grossly normal to inspection, PERRL and " "conjunctivae and sclerae normal  HENT: ear canals and TM's normal, nose and mouth without ulcers or lesions  NECK: no adenopathy, no asymmetry, masses, or scars and thyroid normal to palpation  RESP: lungs clear to auscultation - no rales, rhonchi or wheezes  CV: regular rate and rhythm, normal S1 S2, no S3 or S4, no murmur, click or rub, no peripheral edema and peripheral pulses strong  ABDOMEN: soft, nontender, no hepatosplenomegaly, no masses and bowel sounds normal  MS: no gross musculoskeletal defects noted, no edema  SKIN: no suspicious lesions or rashes  NEURO: Normal strength and tone, mentation intact and speech normal  PSYCH: mentation appears normal, affect normal/bright  Inspection: No effusion, No quad atrophy  Tender: medial patellar facet, medial joint line, medial tibial plateau, medial femoral condyle MCL  Non-tender: lateral patellar facet, LCL, lateral joint line, lateral tibial plateau, lateral femoral condyle  Active Range of Motion: pain with flexion, pain with extension  Special tests: normal Valgus stress test, positive Valgus stress test, positive Lachman's test, negative pivot shift     Also examined: hip full range of motion      Assessment & Plan     (M17.11) Primary osteoarthritis of right knee  Comment: Patient has a tear of the meniscus is awaiting surgery once he loses weight plan to have surgery in the next 2 to 3 months.  Did refill his pain medications today.  If it goes on longer than 2 more months we will have patient sign pain contract  Plan: HYDROcodone-acetaminophen (NORCO) 7.5-325 MG         per tablet    (S83.241A) Tear of medial meniscus of right knee, current, unspecified tear type, initial encounter  Comment:   Plan: HYDROcodone-acetaminophen (NORCO) 7.5-325 MG         per tablet             BMI:   Estimated body mass index is 40.77 kg/m  as calculated from the following:    Height as of this encounter: 1.854 m (6' 1\").    Weight as of this encounter: 140.2 kg (309 lb). "   Weight management plan: Discussed healthy diet and exercise guidelines        See Patient Instructions    Return in about 4 weeks (around 7/17/2019) for Knee pain.    ENMANUEL Lozoya [facility]

## 2019-06-24 ENCOUNTER — TELEPHONE (OUTPATIENT)
Dept: FAMILY MEDICINE | Facility: CLINIC | Age: 54
End: 2019-06-24

## 2019-06-24 NOTE — TELEPHONE ENCOUNTER
Panel Management Review      Patient has the following on his problem list:     Diabetes    ASA: Passed    Last A1C  Lab Results   Component Value Date    A1C 10.5 02/22/2019    A1C 10.5 10/26/2018    A1C 12.5 03/19/2018     A1C tested: FAILED    Last LDL:    Lab Results   Component Value Date    CHOL 182 02/22/2019     Lab Results   Component Value Date    HDL 31 02/22/2019     Lab Results   Component Value Date    LDL  02/22/2019     Cannot estimate LDL when triglyceride exceeds 400 mg/dL     02/22/2019     Lab Results   Component Value Date    TRIG 482 02/22/2019     No results found for: CHOLHDLRATIO  Lab Results   Component Value Date    NHDL 151 02/22/2019       Is the patient on a Statin? YES             Is the patient on Aspirin? NO    Medications     HMG CoA Reductase Inhibitors     atorvastatin (LIPITOR) 20 MG tablet             Last three blood pressure readings:  BP Readings from Last 3 Encounters:   06/19/19 136/80   05/23/19 132/82   05/07/19 136/84       Date of last diabetes office visit: 02/2019     Tobacco History:     History   Smoking Status     Former Smoker     Packs/day: 1.00     Years: 25.00     Types: Cigarettes     Quit date: 2/26/2010   Smokeless Tobacco     Never Used     Comment: started at age 19           Composite cancer screening  Chart review shows that this patient is due/due soon for the following None  Summary:    Patient is due/failing the following:   A1C    Action needed:   Patient needs office visit for Diabetes.    Type of outreach:    Sent [employer] message.    Questions for provider review:    None                                                                                                                                    Ara Hollis CMA       Chart routed to  .

## 2019-07-10 ENCOUNTER — TELEPHONE (OUTPATIENT)
Dept: FAMILY MEDICINE | Facility: CLINIC | Age: 54
End: 2019-07-10

## 2019-07-10 NOTE — TELEPHONE ENCOUNTER
Patient was told to return for fasting labs, reminder letter was sent to patient on 06/05/2019, patient has still not scheduled an appointment.

## 2019-07-23 ENCOUNTER — TELEPHONE (OUTPATIENT)
Dept: FAMILY MEDICINE | Facility: CLINIC | Age: 54
End: 2019-07-23

## 2019-07-23 NOTE — TELEPHONE ENCOUNTER
Panel Management Review      Patient has the following on his problem list:     Diabetes    ASA: Not Required     Last A1C  Lab Results   Component Value Date    A1C 10.5 02/22/2019    A1C 10.5 10/26/2018    A1C 12.5 03/19/2018     A1C tested: FAILED    Last LDL:    Lab Results   Component Value Date    CHOL 182 02/22/2019     Lab Results   Component Value Date    HDL 31 02/22/2019     Lab Results   Component Value Date    LDL  02/22/2019     Cannot estimate LDL when triglyceride exceeds 400 mg/dL     02/22/2019     Lab Results   Component Value Date    TRIG 482 02/22/2019     No results found for: CHOLHDLRATIO  Lab Results   Component Value Date    NHDL 151 02/22/2019       Is the patient on a Statin? YES             Is the patient on Aspirin? NO    Medications     HMG CoA Reductase Inhibitors     atorvastatin (LIPITOR) 20 MG tablet             Last three blood pressure readings:  BP Readings from Last 3 Encounters:   06/19/19 136/80   05/23/19 132/82   05/07/19 136/84       Date of last diabetes office visit: 2/2019     Tobacco History:     History   Smoking Status     Former Smoker     Packs/day: 1.00     Years: 25.00     Types: Cigarettes     Quit date: 2/26/2010   Smokeless Tobacco     Never Used     Comment: started at age 19           Composite cancer screening  Chart review shows that this patient is due/due soon for the following None  Summary:    Patient is due/failing the following:   lipids and A1C    Action needed:   Patient needs office visit for a diabetes check and fasting labs.    Type of outreach:    Phone, left message for patient to call back.     Questions for provider review:    None                                                                                                                                    Nellie Dickerson MA      Chart routed to Care Team .

## 2019-07-26 ENCOUNTER — MYC REFILL (OUTPATIENT)
Dept: FAMILY MEDICINE | Facility: CLINIC | Age: 54
End: 2019-07-26

## 2019-07-26 DIAGNOSIS — S83.241A TEAR OF MEDIAL MENISCUS OF RIGHT KNEE, CURRENT, UNSPECIFIED TEAR TYPE, INITIAL ENCOUNTER: ICD-10-CM

## 2019-07-26 DIAGNOSIS — M17.11 PRIMARY OSTEOARTHRITIS OF RIGHT KNEE: ICD-10-CM

## 2019-07-29 RX ORDER — HYDROCODONE BITARTRATE AND ACETAMINOPHEN 7.5; 325 MG/1; MG/1
1 TABLET ORAL EVERY 6 HOURS PRN
Qty: 90 TABLET | Refills: 0 | Status: SHIPPED | OUTPATIENT
Start: 2019-07-29 | End: 2019-09-20

## 2019-07-29 NOTE — TELEPHONE ENCOUNTER
Called and scheduled patient to see Dr. Eduardo for his diabetes check on 8/19/19.    Nellie Dickerson MA

## 2019-07-29 NOTE — TELEPHONE ENCOUNTER
Notify patient I have refilled his medication for 1 month if he needs any further refills will need to make an appointment.  Prescription was sent to [facility].    Nataly Pacheco CNP

## 2019-08-19 ENCOUNTER — OFFICE VISIT (OUTPATIENT)
Dept: FAMILY MEDICINE | Facility: CLINIC | Age: 54
End: 2019-08-19
Payer: COMMERCIAL

## 2019-08-19 VITALS
BODY MASS INDEX: 41.22 KG/M2 | TEMPERATURE: 98.2 F | HEART RATE: 87 BPM | HEIGHT: 73 IN | WEIGHT: 311 LBS | RESPIRATION RATE: 16 BRPM | DIASTOLIC BLOOD PRESSURE: 86 MMHG | SYSTOLIC BLOOD PRESSURE: 137 MMHG

## 2019-08-19 DIAGNOSIS — E78.5 HYPERLIPIDEMIA LDL GOAL <100: ICD-10-CM

## 2019-08-19 DIAGNOSIS — E11.9 TYPE 2 DIABETES MELLITUS WITHOUT COMPLICATION, WITHOUT LONG-TERM CURRENT USE OF INSULIN (H): Primary | ICD-10-CM

## 2019-08-19 LAB
CHOLEST SERPL-MCNC: 145 MG/DL
HBA1C MFR BLD: 9.7 % (ref 0–5.6)
HDLC SERPL-MCNC: 46 MG/DL
LDLC SERPL CALC-MCNC: 45 MG/DL
NONHDLC SERPL-MCNC: 99 MG/DL
TRIGL SERPL-MCNC: 271 MG/DL

## 2019-08-19 PROCEDURE — 36415 COLL VENOUS BLD VENIPUNCTURE: CPT | Performed by: FAMILY MEDICINE

## 2019-08-19 PROCEDURE — 80061 LIPID PANEL: CPT | Performed by: FAMILY MEDICINE

## 2019-08-19 PROCEDURE — 99214 OFFICE O/P EST MOD 30 MIN: CPT | Performed by: FAMILY MEDICINE

## 2019-08-19 PROCEDURE — 83036 HEMOGLOBIN GLYCOSYLATED A1C: CPT | Performed by: FAMILY MEDICINE

## 2019-08-19 PROCEDURE — 99207 C FOOT EXAM  NO CHARGE: CPT | Performed by: FAMILY MEDICINE

## 2019-08-19 RX ORDER — LIRAGLUTIDE 6 MG/ML
INJECTION SUBCUTANEOUS
Qty: 3 ML | Refills: 11 | Status: SHIPPED | OUTPATIENT
Start: 2019-08-19 | End: 2019-09-17

## 2019-08-19 ASSESSMENT — MIFFLIN-ST. JEOR: SCORE: 2319.57

## 2019-08-19 NOTE — NURSING NOTE
"Chief Complaint   Patient presents with     Diabetes     Lipids       Initial /86 (BP Location: Right arm, Patient Position: Chair, Cuff Size: Adult Large)   Pulse 87   Temp 98.2  F (36.8  C) (Tympanic)   Resp 16   Ht 1.854 m (6' 1\")   Wt 141.1 kg (311 lb)   BMI 41.03 kg/m   Estimated body mass index is 41.03 kg/m  as calculated from the following:    Height as of this encounter: 1.854 m (6' 1\").    Weight as of this encounter: 141.1 kg (311 lb).    Patient presents to the clinic using No DME    Health Maintenance that is potentially due pending provider review:  NONE        Is there anyone who you would like to be able to receive your results? No  If yes have patient fill out SINDY      "

## 2019-08-19 NOTE — PROGRESS NOTES
SUBJECTIVE: Chepe Elkins is a 53 year old male  Chief Complaint   Patient presents with     Diabetes     Lipids      Diabetes Follow-up      How often are you checking your blood sugar? Two times daily.  Checks after breakfast (15 minutes) Before dinner.      Glucometer range within the past  Month=110-200.    Taking empagliflozin (Jardiance) 25mg and metformin 1000mg twice daily.  No side effects.  Not missing doses.     What time of day are you checking your blood sugars (select all that apply)?  Before and after meals    Have you had any blood sugars above 200?  No, usually around 180-200    Have you had any blood sugars below 70?  No    What symptoms do you notice when your blood sugar is low?  None    What concerns do you have today about your diabetes? None     Do you have any of these symptoms? (Select all that apply)  No numbness or tingling in feet.  No redness, sores or blisters on feet.  No complaints of excessive thirst.  No reports of blurry vision.  No significant changes to weight.     Have you had a diabetic eye exam in the last 12 months? Yes- Date of last eye exam: 3/1/2019    Occupation: installing [employer]    Exercise: some swimming.     BP Readings from Last 2 Encounters:   06/19/19 136/80   05/23/19 132/82     Hemoglobin A1C (%)   Date Value   02/22/2019 10.5 (H)   10/26/2018 10.5 (H)     LDL Cholesterol Calculated (mg/dL)   Date Value   02/22/2019     Cannot estimate LDL when triglyceride exceeds 400 mg/dL   02/27/2018 93     LDL Cholesterol Direct (mg/dL)   Date Value   02/22/2019 100 (H)       Diabetes Management Resources  Hyperlipidemia Follow-Up      Are you having any of the following symptoms? (Select all that apply)  No complaints of shortness of breath, chest pain or pressure.  No increased sweating or nausea with activity.  No left-sided neck or arm pain.  No complaints of pain in calves when walking 1-2 blocks.    Are you regularly taking any medication or supplement to lower your  cholesterol?   Yes- Atorvastatin    Are you having muscle aches or other side effects that you think could be caused by your cholesterol lowering medication?  No  HE has started the atorvastatin.       How many servings of fruits and vegetables do you eat daily?  2-3    On average, how many sweetened beverages do you drink each day (soda, juice, sweet tea, etc)?   0    How many days per week do you miss taking your medication? 0    Last clinic visit:2/22/2019  Medication or other changes at last visit:Increase Jardiance.  Add atorvastatin.   Weight since last visit?  Up 10#  Wt Readings from Last 5 Encounters:   08/19/19 141.1 kg (311 lb)   06/19/19 140.2 kg (309 lb)   05/23/19 140.9 kg (310 lb 9.6 oz)   05/07/19 140.6 kg (310 lb)   05/02/19 142.4 kg (314 lb)      History   Smoking Status     Former Smoker     Packs/day: 1.00     Years: 25.00     Types: Cigarettes     Quit date: 2/26/2010   Smokeless Tobacco     Never Used     Comment: started at age 19       Past medical history reviewed and updated    Patient Active Problem List    Diagnosis Date Noted     Hyperlipidemia LDL goal <100 02/22/2019     Priority: Medium     Acute deep vein thrombosis (DVT) of popliteal vein of right lower extremity (H) 03/28/2018     Priority: Medium     3/28/2018:Unprovoked.  PLAN: indefinite anticoagulation.        Osteoarthritis of both knees, unspecified osteoarthritis type 03/28/2018     Priority: Medium     Type 2 diabetes mellitus without complication, without long-term current use of insulin (H) 03/19/2018     Priority: Medium     Morbid obesity (H) 03/19/2018     Priority: Medium     Current Outpatient Medications   Medication Sig Dispense Refill     atorvastatin (LIPITOR) 20 MG tablet Take 1 tablet (20 mg) by mouth daily 90 tablet 3     blood glucose (NO BRAND SPECIFIED) lancets standard Use to test blood sugar 2-3 times daily or as directed. 100 each 3     blood glucose monitoring (NO BRAND SPECIFIED) meter device kit Use  "to test blood sugar 2-3 times daily or as directed. 1 kit 0     blood glucose monitoring (NO BRAND SPECIFIED) test strip Use to test blood sugar 2-3 times daily or as directed. 100 strip 11     empagliflozin (JARDIANCE) 25 MG TABS tablet Take 1 tablet (25 mg) by mouth daily 90 tablet 3     HYDROcodone-acetaminophen (NORCO) 7.5-325 MG per tablet Take 1 tablet by mouth every 6 hours as needed for severe pain Needs an appointment for further refills 90 tablet 0     metFORMIN (GLUCOPHAGE) 1000 MG tablet Take 1 tablet (1,000 mg) by mouth 2 times daily (with meals) 60 tablet 11     Multiple Vitamin (MULTI VITAMIN MENS PO) Take 1 tablet by mouth daily       order for DME Equipment being ordered: knee brace true pull 1 Units 0     rivaroxaban ANTICOAGULANT (XARELTO) 20 MG TABS tablet Take 1 tablet (20 mg) by mouth daily (with dinner) Start after completing the 15mg twice daily dose for the first 21 days. 30 tablet 11     sildenafil (REVATIO) 20 MG tablet Sidenafil (Viagra) comes in 20mg strength pills. Take as many pills as needed up to maximum of 5 pills at a time prior to intercourse. 50 tablet 11     ROS:General: No change in weight, sleep or appetite.  Normal energy.  No fever or chills  Resp: No coughing, wheezing or shortness of breath  CV: No chest pains or palpitations  GI: No nausea, vomiting,  heartburn, abdominal pain, diarrhea, constipation or change in bowel habits  : No urinary frequency or dysuria, bladder or kidney problems  Psychiatric: No problems with anxiety, depression or mental health  Musculoskeletal: POSITIVE  for:, pain right knee and wearing brace.      OBJECTIVE:Blood pressure 137/86, pulse 87, temperature 98.2  F (36.8  C), temperature source Tympanic, resp. rate 16, height 1.854 m (6' 1\"), weight 141.1 kg (311 lb). BMI=Body mass index is 41.03 kg/m .  GENERAL APPEARANCE ADULT: Alert, no acute distress, morbidly obese  NECK: No adenopathy,masses or thyromegaly  RESP: lungs clear to auscultation "   CV: normal rate, regular rhythm, no murmur or gallop  ABDOMEN: soft, no organomegaly, masses or tenderness.  Diastasis recti  MS: extremities normal, no peripheral edema   Foot exam:normal DP and PT pulses, no trophic changes or ulcerative lesions and normal monofilament exam.    ASSESSMENT/PLAN:                                                    Lifestyle recommendations:regular exercise, at least 150 minutes per week (average 30 minutes 5 times a week)  keep working on losing weight (ideal BMI-body mass index is <25)  Diabetic recommendations:-schedule appointment to see diabetic nurse educator  return for follow-up visit in 3 month(s)    (E11.9) Type 2 diabetes mellitus without complication, without long-term current use of insulin (H)  (primary encounter diagnosis)  Comment: not well controlled.  Likely will need insulin.  Has DOT license.  Would like to improve control without insulin if possible.   Weight loss will offer the biggest improvement if possible to do as you likely have a lot of resistance to the insulin your body is still able to produce.   Seeing dietician or weight loss clinic could be another resource.     Plan: MED THERAPY MANAGE REFERRAL, Lipid panel reflex        to direct LDL Fasting, Hemoglobin A1c, FOOT         EXAM, liraglutide (VICTOZA) 18 MG/3ML solution,        DIABETES EDUCATOR REFERRAL          Schedule an appointment with diabetic educator.   Schedule an appointment with  [facility] pharmacist.   Continue the empagliflozin (Jardiance) ane metformin as you have been.   Add liraglutide starting at injection of 0.6mg daily and increasing to 1.2mg daily after one week.     (E78.5) Hyperlipidemia LDL goal <100  Comment:   Plan: Lipid panel reflex to direct LDL Fasting             Diabetes Type II, A1c Uncontrolled, non-insulin dependent   Associated with the following complications:none

## 2019-08-19 NOTE — PATIENT INSTRUCTIONS
ASSESSMENT/PLAN:                                                    Lifestyle recommendations:regular exercise, at least 150 minutes per week (average 30 minutes 5 times a week)  keep working on losing weight (ideal BMI-body mass index is <25)  Diabetic recommendations:-schedule appointment to see diabetic nurse educator  return for follow-up visit in 3 month(s)    (E11.9) Type 2 diabetes mellitus without complication, without long-term current use of insulin (H)  (primary encounter diagnosis)  Comment: not well controlled.  Likely will need insulin.  Has DOT license.  Would like to improve control without insulin if possible.   Weight loss will offer the biggest improvement if possible to do as you likely have a lot of resistance to the insulin your body is still able to produce.   Seeing dietician or weight loss clinic could be another resource.     Plan: MED THERAPY MANAGE REFERRAL, Lipid panel reflex        to direct LDL Fasting, Hemoglobin A1c, FOOT         EXAM, liraglutide (VICTOZA) 18 MG/3ML solution,        DIABETES EDUCATOR REFERRAL          Schedule an appointment with diabetic educator.   Schedule an appointment with  MT pharmacist.   Continue the empagliflozin (Jardiance) ane metformin as you have been.   Add liraglutide starting at injection of 0.6mg daily and increasing to 1.2mg daily after one week.     (E78.5) Hyperlipidemia LDL goal <100  Comment:   Plan: Lipid panel reflex to direct LDL Fasting

## 2019-08-20 ENCOUNTER — TELEPHONE (OUTPATIENT)
Dept: FAMILY MEDICINE | Facility: CLINIC | Age: 54
End: 2019-08-20

## 2019-08-20 NOTE — RESULT ENCOUNTER NOTE
Alejo Mo,   Triglycerides, a type of fat found in the bloodstream is high.  Other lipids all good.   Hgb A1C is high as we discussed.   ROSIE ORTEGA MD

## 2019-08-20 NOTE — TELEPHONE ENCOUNTER
Diabetes Education Scheduling Outreach #1:    Call to patient to schedule. Left message with phone number to call to schedule.    Plan for 2nd outreach attempt within 1 week.    Dick Nuñez OnCall  Diabetes and Nutrition Scheduling

## 2019-08-21 ENCOUNTER — TELEPHONE (OUTPATIENT)
Dept: FAMILY MEDICINE | Facility: CLINIC | Age: 54
End: 2019-08-21

## 2019-08-21 NOTE — TELEPHONE ENCOUNTER
MTM referral from: [facility] visit (referral by provider)    MTM referral outreach attempt #2 on August 21, 2019 at 9:55 AM      Outcome: Patient not reachable after several attempts, will route to MTM Pharmacist/Provider as an FYI. Thank you for the referral.    Clarissa Johnson, MTM Coordinator

## 2019-08-21 NOTE — LETTER
[facility]  [address] [place] [id number] [phone]      August 26, 2019    Chepe FRANCE Severo                                                                                                                     [address] [facility] [address]      Dear Dr. Alesha Mo has recommended you schedule a Medication Therapy Management (MTM) appointment. MTM is designed to help you get the most of out of your medicines.     During an MTM appointment a specially trained pharmacist will review all of your medicines, both prescription and over-the-counter. They will make sure your medicines are the best choice for you and are safe and convenient for you.  MTM pharmacists work together with you and your doctor to help you understand your medicines, solve any problems related to your medicines and help you get the best results from taking your medicines.     At [facility], we strongly believe in a team approach to health care. We want to help you understand your medicines and health conditions. To learn more about how you might benefit from MTM services, watch the patient video at [email or web address].     To make an appointment, please call the [facility] at [phone].    We look forward to hearing from you!        Jude Sanabria, PharmD, BCACP  Medication Therapy Management Pharmacist  Pager: [phone]

## 2019-08-26 NOTE — TELEPHONE ENCOUNTER
Referral letter sent.    Jude Sanabria, PharmD, [facility]  Medication Therapy Management Pharmacist  Pager: [phone]

## 2019-09-17 ENCOUNTER — TELEPHONE (OUTPATIENT)
Dept: FAMILY MEDICINE | Facility: CLINIC | Age: 54
End: 2019-09-17

## 2019-09-17 ENCOUNTER — ALLIED HEALTH/NURSE VISIT (OUTPATIENT)
Dept: EDUCATION SERVICES | Facility: CLINIC | Age: 54
End: 2019-09-17
Payer: COMMERCIAL

## 2019-09-17 VITALS — WEIGHT: 308.2 LBS | BODY MASS INDEX: 40.66 KG/M2

## 2019-09-17 DIAGNOSIS — I82.431 ACUTE DEEP VEIN THROMBOSIS (DVT) OF POPLITEAL VEIN OF RIGHT LOWER EXTREMITY (H): Primary | ICD-10-CM

## 2019-09-17 DIAGNOSIS — E11.9 TYPE 2 DIABETES MELLITUS WITHOUT COMPLICATION, WITHOUT LONG-TERM CURRENT USE OF INSULIN (H): ICD-10-CM

## 2019-09-17 PROCEDURE — G0108 DIAB MANAGE TRN  PER INDIV: HCPCS | Performed by: DIETITIAN, REGISTERED

## 2019-09-17 RX ORDER — LIRAGLUTIDE 6 MG/ML
INJECTION SUBCUTANEOUS
Qty: 9 ML | Refills: 5 | Status: SHIPPED | OUTPATIENT
Start: 2019-09-17 | End: 2020-03-09

## 2019-09-17 NOTE — PATIENT INSTRUCTIONS
1.  Work on activity the best you can.  Ideas: pool in town, commercial exercises.    2.  Watch your carbohydrate choices 3-4 at meals , 1-2 at snacks.    3.  Increase the victoza to 1.8 mg daily    4. Test your blood sugars daily rotate between in the morning and one other time

## 2019-09-17 NOTE — PROGRESS NOTES
"Diabetes Self-Management Education & Support    Diabetes Education Self Management & Training    SUBJECTIVE/OBJECTIVE:  Presents for: Individual review  Accompanied by: Self  Diabetes education in the past 24mo: (P) No  Focus of Visit: Being Active, Problem Solving, Healthy Eating, Taking Medication, GLP-1 Start  GLP-1 Type: Victoza  Diabetes type: (P) Type 2  Disease course: (P) Improving  How confident are you filling out medical forms by yourself:: Not Assessed  Transportation concerns: No  Other concerns:: None  Cultural Influences/Ethnic Background:  American      Diabetes Symptoms & Complications  Blurred vision: (P) No  Fatigue: (P) No  Foot ulcerations: (P) No  Polydipsia: (P) No  Polyphagia: (P) No  Polyuria: (P) Yes  Visual change: (P) No  Weakness: (P) Yes  Weight loss: (P) No  Slow healing wounds: (P) Yes  Weight trend: (P) Fluctuating minimally  Autonomic neuropathy: (P) No  CVA: (P) No  Heart disease: (P) No  Nephropathy: (P) No  Peripheral neuropathy: (P) No  Peripheral Vascular Disease: (P) No  Retinopathy: (P) No  Sexual dysfunction: (P) Yes    Patient Problem List and Family Medical History reviewed for relevant medical history, current medical status, and diabetes risk factors.    Vitals:  Wt 139.8 kg (308 lb 3.2 oz)   BMI 40.66 kg/m    Estimated body mass index is 40.66 kg/m  as calculated from the following:    Height as of 8/19/19: 1.854 m (6' 1\").    Weight as of this encounter: 139.8 kg (308 lb 3.2 oz).   Last 3 BP:   BP Readings from Last 3 Encounters:   08/19/19 137/86   06/19/19 136/80   05/23/19 132/82       History   Smoking Status     Former Smoker     Packs/day: 1.00     Years: 25.00     Types: Cigarettes     Quit date: 2/26/2010   Smokeless Tobacco     Never Used     Comment: started at age 19       Labs:  Lab Results   Component Value Date    A1C 9.7 08/19/2019     Lab Results   Component Value Date     02/22/2019     Lab Results   Component Value Date    LDL 45 08/19/2019 "     HDL Cholesterol   Date Value Ref Range Status   08/19/2019 46 >39 mg/dL Final   ]  GFR Estimate   Date Value Ref Range Status   02/22/2019 >90 >60 mL/min/[1.73_m2] Final     Comment:     Non  GFR Calc  Starting 12/18/2018, serum creatinine based estimated GFR (eGFR) will be   calculated using the Chronic Kidney Disease Epidemiology Collaboration   (CKD-EPI) equation.       GFR Estimate If Black   Date Value Ref Range Status   02/22/2019 >90 >60 mL/min/[1.73_m2] Final     Comment:      GFR Calc  Starting 12/18/2018, serum creatinine based estimated GFR (eGFR) will be   calculated using the Chronic Kidney Disease Epidemiology Collaboration   (CKD-EPI) equation.       Lab Results   Component Value Date    CR 0.74 02/22/2019     No results found for: MICROALBUMIN    Healthy Eating  Cultural/Episcopalian diet restrictions?: (P) No  Meal planning: (P) Smaller portions  Meals include: (P) Breakfast, Dinner  Breakfast: two eggs, silverio, two toast, glass milk12 oz  Lunch: when working no if not working: three slices of pizza frozen, milk 12 oz  Dinner: quesidilla chicken/steak 1 two shells, milk or bbq pork ribs, corn on cob, baked beans 1 cup, [place] 2, water  Snacks: BBQ weiners, chips adn cheese, fruit out of can,   Beverages: (P) Water, Milk, Coffee drinks  Has patient met with a dietitian in the past?: (P) No    Being Active  Barrier to exercise: (P) Physical limitation    Monitoring  Blood Glucose Meter: (P) ContourNext  Home Glucose (Sugar) Monitoring: (P) 1-2 times per day  Blood glucose trend: (P) Decreasing steadily  High Glucose Range (mg/dL): (P) 180-200  Overall Range (mg/dL): (P) 180-200      Taking Medications  Diabetes Medication(s)     Biguanides       metFORMIN (GLUCOPHAGE) 1000 MG tablet    Take 1 tablet (1,000 mg) by mouth 2 times daily (with meals)    Sodium-Glucose Co-Transporter 2 (SGLT2) Inhibitors       empagliflozin (JARDIANCE) 25 MG TABS tablet    Take 1 tablet (25  mg) by mouth daily    Incretin Mimetic Agents (GLP-1 Receptor Agonists)       liraglutide (VICTOZA) 18 MG/3ML solution    Take 1.8 mg daily in the morning          Current Treatments: (P) Non-insulin Injectables, Oral Agent (dual therapy)    Problem Solving  Hypoglycemia Frequency: (P) Never  Patient carries a carbohydrate source: (P) No  Medical alert: (P) No  Severe weather/disaster plan for diabetes management?: (P) No  DKA prevention plan?: (P) No  Sick day plan for diabetes management?: (P) No              Reducing Risks  Has dilated eye exam at least once a year?: (P) Yes  Sees dentist every 6 months?: (P) Yes  Sees podiatrist (foot doctor)?: (P) No    Healthy Coping  Informal Support system:: (P) Family, Spouse  Difficulty affording diabetes management supplies?: (P) Yes  Patient Activation Measure Survey Score:  No flowsheet data found.    ASSESSMENT:  Working on getting his wt and a1c down so he can have knee replacement surgery this winter.  NUmbers out of 200's since starting victoza and he feels it affects his apptetite some.        Patient's most recent   Lab Results   Component Value Date    A1C 9.7 08/19/2019    is not meeting goal of <7.0    INTERVENTION:   Diabetes knowledge and skills assessment:     Patient is knowledgeable in diabetes management concepts related to: Healthy Eating, Being Active, Monitoring and Taking Medication    Patient needs further education on the following diabetes management concepts: Healthy Eating, Being Active, Monitoring, Taking Medication, Problem Solving, Reducing Risks and Healthy Coping    Based on learning assessment above, most appropriate setting for further diabetes education would be: Individual setting.    Education provided today on:  AADE Self-Care Behaviors:  Healthy Eating: carbohydrate counting, consistency in amount, composition, and timing of food intake and label reading  Being Active: relationship to blood glucose and describe appropriate activity  program    Opportunities for ongoing education and support in diabetes-self management were discussed.    Pt verbalized understanding of concepts discussed and recommendations provided today.       Education Materials Provided:  No new materials provided today    PLAN:  See Patient Instructions for co-developed, patient-stated behavior change goals.  AVS printed and provided to patient today. See Follow-Up section for recommended follow-up.      Time Spent: 60 minutes  Encounter Type: Individual    Any diabetes medication dose changes were made via the CDE Protocol and Collaborative Practice Agreement with the patient's primary care provider. A copy of this encounter was shared with the provider.

## 2019-09-20 ENCOUNTER — OFFICE VISIT (OUTPATIENT)
Dept: FAMILY MEDICINE | Facility: CLINIC | Age: 54
End: 2019-09-20
Payer: COMMERCIAL

## 2019-09-20 VITALS
OXYGEN SATURATION: 96 % | TEMPERATURE: 98 F | RESPIRATION RATE: 17 BRPM | SYSTOLIC BLOOD PRESSURE: 124 MMHG | DIASTOLIC BLOOD PRESSURE: 80 MMHG | HEART RATE: 90 BPM | WEIGHT: 310 LBS | BODY MASS INDEX: 40.9 KG/M2

## 2019-09-20 DIAGNOSIS — S83.241A TEAR OF MEDIAL MENISCUS OF RIGHT KNEE, CURRENT, UNSPECIFIED TEAR TYPE, INITIAL ENCOUNTER: ICD-10-CM

## 2019-09-20 DIAGNOSIS — E11.9 TYPE 2 DIABETES MELLITUS WITHOUT COMPLICATION, WITHOUT LONG-TERM CURRENT USE OF INSULIN (H): ICD-10-CM

## 2019-09-20 DIAGNOSIS — M17.11 PRIMARY OSTEOARTHRITIS OF RIGHT KNEE: Primary | ICD-10-CM

## 2019-09-20 DIAGNOSIS — E66.01 MORBID OBESITY (H): ICD-10-CM

## 2019-09-20 PROCEDURE — 99213 OFFICE O/P EST LOW 20 MIN: CPT | Performed by: FAMILY MEDICINE

## 2019-09-20 RX ORDER — HYDROCODONE BITARTRATE AND ACETAMINOPHEN 7.5; 325 MG/1; MG/1
1 TABLET ORAL EVERY 6 HOURS PRN
Qty: 90 TABLET | Refills: 0 | Status: SHIPPED | OUTPATIENT
Start: 2019-09-20 | End: 2020-03-09

## 2019-09-20 ASSESSMENT — PAIN SCALES - GENERAL: PAINLEVEL: MODERATE PAIN (5)

## 2019-09-20 NOTE — NURSING NOTE
"Chief Complaint   Patient presents with     Knee Pain     Right- refill Hydrocodone     Flu Shot       Initial /80 (BP Location: Right arm, Patient Position: Chair, Cuff Size: Adult Large)   Pulse 90   Temp 98  F (36.7  C) (Tympanic)   Resp 17   Wt 140.6 kg (310 lb)   SpO2 96%   BMI 40.90 kg/m   Estimated body mass index is 40.9 kg/m  as calculated from the following:    Height as of 8/19/19: 1.854 m (6' 1\").    Weight as of this encounter: 140.6 kg (310 lb).    Patient presents to the clinic using No DME    Health Maintenance that is potentially due pending provider review:  Will get HIV screen at next blod draw for diabetes    Pt declines to have today- but would like flu shot.    Is there anyone who you would like to be able to receive your results? Yes  If yes have patient fill out SINDY    "

## 2019-09-20 NOTE — PROGRESS NOTES
SUBJECTIVE: Chepe Elkins is a 54 year old male  Chief Complaint   Patient presents with     Knee Pain     Right- refill Hydrocodone     Flu Shot      Last clinic visit: 8/19/2019 for diabetes mellitus recheck.   HE had liraglutide added to medication regimen.   Musculoskeletal problem/pain      Duration: Osteoarthritis in both knees- MRI showed torn meniscus R knee    Description  Location: see above    Intensity:  moderate, 5/10    Accompanying signs and symptoms: none    History  Previous similar problem: YES  Previous evaluation:  x-ray, MRI and orthopedic evaluation    Precipitating or alleviating factors:  Trauma or overuse: no   Aggravating factors include: walking and climbing stairs    Therapies tried and outcome: heat and support wrap    He has been taking narcotics up to three per day.   Cannot take NSAIDS due to Xarelto.   He has been seeing orthopedic surgeon.  Was told he cannot do surgery until weight under 300# and diabetes mellitus with improved control.     Problem 2: diabetes mellitus.    He has had readings under 200.  Glucometer range within the ifoe=[phone].       Patient Active Problem List   Diagnosis     Type 2 diabetes mellitus without complication, without long-term current use of insulin (H)     Morbid obesity (H)     Acute deep vein thrombosis (DVT) of popliteal vein of right lower extremity (H)     Osteoarthritis of both knees, unspecified osteoarthritis type     Hyperlipidemia LDL goal <100      OBJECTIVE:Blood pressure 124/80, pulse 90, temperature 98  F (36.7  C), temperature source Tympanic, resp. rate 17, weight 140.6 kg (310 lb), SpO2 96 %. BMI=Body mass index is 40.9 kg/m .  GENERAL APPEARANCE ADULT: Alert, no acute distress, morbidly obese  MS: Wearing brace on right knee.      ASSESSMENT:     ICD-10-CM    1. Primary osteoarthritis of right knee M17.11 HYDROcodone-acetaminophen (NORCO) 7.5-325 MG per tablet   2. Tear of medial meniscus of right knee, current, unspecified tear  type, initial encounter S83.241A HYDROcodone-acetaminophen (NORCO) 7.5-325 MG per tablet   3. Type 2 diabetes mellitus without complication, without long-term current use of insulin (H) E11.9    4. Morbid obesity (H) E66.01       Keep working on weight.   Recheck diabetes mellitus in November.    I did refill your narcotic medication today.  You should not drive when using medication.  Use sparingly.   I will not continue to refill.   You need to get your knee treated so you are not taking the narcotics.   OK for acetaminophen as needed.   Caution because there is some acetaminophen in the narcotic (325mg per pill).      Recheck diabetes mellitus in late November.     Acetaminophen (Tylenol) may be taken as needed for pain and fever.  The maximum doses are listed below, around 3000mg a day.  Regular strength pills are 325mg.  The maximum daily dose is two pills (650mg) every 4 to 6 hours up to 3250mg a day.   Extra strength pills are 500mg each.  The maximum dose is two pills (1000mg) every 6 hours as needed up to 3000mg a day.   Extended release pills are 650mg each.  The maximum dose is two pills (1300mg) every 8 hours as needed up to 3900mg a day.     Watch out for acetaminophen in other over the counter or prescription medications.      Too much acetaminophen can lead to serious liver damage.

## 2019-09-20 NOTE — PATIENT INSTRUCTIONS
ASSESSMENT:     ICD-10-CM    1. Primary osteoarthritis of right knee M17.11 HYDROcodone-acetaminophen (NORCO) 7.5-325 MG per tablet   2. Tear of medial meniscus of right knee, current, unspecified tear type, initial encounter S83.241A HYDROcodone-acetaminophen (NORCO) 7.5-325 MG per tablet   3. Type 2 diabetes mellitus without complication, without long-term current use of insulin (H) E11.9    4. Morbid obesity (H) E66.01       Keep working on weight.   Recheck diabetes mellitus in November.    I did refill your narcotic medication today.  You should not drive when using medication.  Use sparingly.   I will not continue to refill.   You need to get your knee treated so you are not taking the narcotics.   OK for acetaminophen as needed.   Caution because there is some acetaminophen in the narcotic (325mg per pill).      Recheck diabetes mellitus in late November.     Acetaminophen (Tylenol) may be taken as needed for pain and fever.  The maximum doses are listed below, around 3000mg a day.  Regular strength pills are 325mg.  The maximum daily dose is two pills (650mg) every 4 to 6 hours up to 3250mg a day.   Extra strength pills are 500mg each.  The maximum dose is two pills (1000mg) every 6 hours as needed up to 3000mg a day.   Extended release pills are 650mg each.  The maximum dose is two pills (1300mg) every 8 hours as needed up to 3900mg a day.     Watch out for acetaminophen in other over the counter or prescription medications.      Too much acetaminophen can lead to serious liver damage.

## 2019-11-19 ENCOUNTER — ALLIED HEALTH/NURSE VISIT (OUTPATIENT)
Dept: EDUCATION SERVICES | Facility: CLINIC | Age: 54
End: 2019-11-19
Payer: COMMERCIAL

## 2019-11-19 VITALS — BODY MASS INDEX: 39.71 KG/M2 | WEIGHT: 301 LBS

## 2019-11-19 DIAGNOSIS — E11.9 TYPE 2 DIABETES MELLITUS WITHOUT COMPLICATION, WITHOUT LONG-TERM CURRENT USE OF INSULIN (H): ICD-10-CM

## 2019-11-19 DIAGNOSIS — Z23 NEED FOR PROPHYLACTIC VACCINATION AND INOCULATION AGAINST INFLUENZA: ICD-10-CM

## 2019-11-19 DIAGNOSIS — E11.9 TYPE 2 DIABETES MELLITUS WITHOUT COMPLICATION, WITHOUT LONG-TERM CURRENT USE OF INSULIN (H): Primary | ICD-10-CM

## 2019-11-19 LAB — HBA1C MFR BLD: 7.3 % (ref 0–5.6)

## 2019-11-19 PROCEDURE — G0108 DIAB MANAGE TRN  PER INDIV: HCPCS | Performed by: DIETITIAN, REGISTERED

## 2019-11-19 PROCEDURE — 90682 RIV4 VACC RECOMBINANT DNA IM: CPT | Performed by: DIETITIAN, REGISTERED

## 2019-11-19 PROCEDURE — 36415 COLL VENOUS BLD VENIPUNCTURE: CPT | Performed by: FAMILY MEDICINE

## 2019-11-19 PROCEDURE — 90471 IMMUNIZATION ADMIN: CPT | Performed by: DIETITIAN, REGISTERED

## 2019-11-19 PROCEDURE — 83036 HEMOGLOBIN GLYCOSYLATED A1C: CPT | Performed by: FAMILY MEDICINE

## 2019-11-19 NOTE — PATIENT INSTRUCTIONS
1.  Continue to watch your portions, more vegetables/salads.    2.  Stay as active as able    3.  Call and make an appointment if you notice your blood sugars are increasing

## 2019-11-19 NOTE — PROGRESS NOTES
"Diabetes Self-Management Education & Support    Diabetes Education Self Management & Training    SUBJECTIVE/OBJECTIVE:  Presents for: Individual review  Accompanied by: Self  Diabetes education in the past 24mo: (P) Yes  Diabetes type: (P) Type 2  Disease course: (P) Improving  Cultural Influences/Ethnic Background:  American      Diabetes Symptoms & Complications  Blurred vision: (P) No  Fatigue: (P) No  Foot ulcerations: (P) No  Polydipsia: (P) No  Polyphagia: (P) No  Polyuria: (P) No  Visual change: (P) No  Weakness: (P) No  Weight loss: (P) Yes  Slow healing wounds: (P) No  Weight trend: (P) Decreasing steadily  Autonomic neuropathy: (P) No  CVA: (P) No  Heart disease: (P) No  Nephropathy: (P) No  Peripheral neuropathy: (P) No  Peripheral Vascular Disease: (P) No  Retinopathy: (P) No  Sexual dysfunction: (P) Yes    Patient Problem List and Family Medical History reviewed for relevant medical history, current medical status, and diabetes risk factors.    Vitals:  Wt 136.5 kg (301 lb)   BMI 39.71 kg/m    Estimated body mass index is 39.71 kg/m  as calculated from the following:    Height as of 8/19/19: 1.854 m (6' 1\").    Weight as of this encounter: 136.5 kg (301 lb).   Last 3 BP:   BP Readings from Last 3 Encounters:   09/20/19 124/80   08/19/19 137/86   06/19/19 136/80       History   Smoking Status     Former Smoker     Packs/day: 1.00     Years: 25.00     Types: Cigarettes     Quit date: 2/26/2010   Smokeless Tobacco     Never Used     Comment: started at age 19       Labs:  Lab Results   Component Value Date    A1C 9.7 08/19/2019     Lab Results   Component Value Date     02/22/2019     Lab Results   Component Value Date    LDL 45 08/19/2019     HDL Cholesterol   Date Value Ref Range Status   08/19/2019 46 >39 mg/dL Final   ]  GFR Estimate   Date Value Ref Range Status   02/22/2019 >90 >60 mL/min/[1.73_m2] Final     Comment:     Non  GFR Calc  Starting 12/18/2018, serum creatinine " based estimated GFR (eGFR) will be   calculated using the Chronic Kidney Disease Epidemiology Collaboration   (CKD-EPI) equation.       GFR Estimate If Black   Date Value Ref Range Status   02/22/2019 >90 >60 mL/min/[1.73_m2] Final     Comment:      GFR Calc  Starting 12/18/2018, serum creatinine based estimated GFR (eGFR) will be   calculated using the Chronic Kidney Disease Epidemiology Collaboration   (CKD-EPI) equation.       Lab Results   Component Value Date    CR 0.74 02/22/2019     No results found for: MICROALBUMIN    Healthy Eating  Cultural/Jew diet restrictions?: (P) No  Meal planning: (P) Avoiding sweets, Carbohydrate counting, Smaller portions  Meals include: (P) Breakfast, Dinner  Breakfast: two eggs, silverio, 1 toast, milk  Lunch: none or atkins bar  Dinner: ebenezer pizza or chicken roxy   Snacks: cheese  Beverages: (P) Water, Coffee, Milk  Has patient met with a dietitian in the past?: (P) No    Being Active  Exercise:: (not much, due to knees)  Barrier to exercise: (P) Physical limitation    Monitoring  Blood Glucose Meter: (P) Accu-check  Home Glucose (Sugar) Monitoring: (P) 1-2 times per day  Blood glucose trend: (P) Decreasing steadily  Low Glucose Range (mg/dL): (P)   High Glucose Range (mg/dL): (P) 180-200   Breakfast units Lunch units Supper  units    17-Oct 166         26-Oct 170         2-Nov       174   4-Nov     120     6-Nov   115       13-Nov 152         15-Nov 156         17-Nov 174    179      #DIV/0! 115 #DIV/0! 150 #DIV/0! 174         Taking Medications  Diabetes Medication(s)     Biguanides       metFORMIN (GLUCOPHAGE) 1000 MG tablet    Take 1 tablet (1,000 mg) by mouth 2 times daily (with meals)    Sodium-Glucose Co-Transporter 2 (SGLT2) Inhibitors       empagliflozin (JARDIANCE) 25 MG TABS tablet    Take 1 tablet (25 mg) by mouth daily    Incretin Mimetic Agents (GLP-1 Receptor Agonists)       liraglutide (VICTOZA) 18 MG/3ML solution    Take  1.8 mg daily in the morning          Current Treatments: (P) Non-insulin Injectables, Oral Agent (triple therapy)    Problem Solving  Hypoglycemia Frequency: (P) Never  Hypoglycemia Treatment: (P) Other food  Patient carries a carbohydrate source: (P) No  Medical alert: (P) No  Severe weather/disaster plan for diabetes management?: (P) No  DKA prevention plan?: (P) No  Sick day plan for diabetes management?: (P) No              Reducing Risks  CAD Risks: (P) Obesity, Stress  Has dilated eye exam at least once a year?: (P) Yes  Sees dentist every 6 months?: (P) Yes  Sees podiatrist (foot doctor)?: (P) No    Healthy Coping  Informal Support system:: (P) Children, Family, Friends, Parent, Spouse  Difficulty affording diabetes management supplies?: (P) No  Patient Activation Measure Survey Score:  No flowsheet data found.    ASSESSMENT:  Pt cutting back on portions, wt is down 9 lbs since last review.  BG 14 day average 155.  Improving.  Not able to be very active with knees right now.        Patient's most recent   Lab Results   Component Value Date    A1C 9.7 08/19/2019    is not meeting goal of <8.0  Pt having a1c drawn today  INTERVENTION:   Diabetes knowledge and skills assessment:     Patient is knowledgeable in diabetes management concepts related to: Healthy Eating, Being Active and Monitoring    Patient needs further education on the following diabetes management concepts: Healthy Eating, Being Active, Monitoring, Taking Medication, Problem Solving, Reducing Risks and Healthy Coping    Based on learning assessment above, most appropriate setting for further diabetes education would be: Individual setting.    Education provided today on:  AADE Self-Care Behaviors:  Healthy Eating: consistency in amount, composition, and timing of food intake and portion control  Being Active: relationship to blood glucose and describe appropriate activity program    Opportunities for ongoing education and support in  diabetes-self management were discussed.    Pt verbalized understanding of concepts discussed and recommendations provided today.       Education Materials Provided:  No new materials provided today    PLAN:  See Patient Instructions for co-developed, patient-stated behavior change goals.  AVS printed and provided to patient today. See Follow-Up section for recommended follow-up.      Time Spent: 30 minutes  Encounter Type: Individual    Any diabetes medication dose changes were made via the CDE Protocol and Collaborative Practice Agreement with the patient's primary care provider. A copy of this encounter was shared with the provider.

## 2019-11-20 NOTE — RESULT ENCOUNTER NOTE
Alejo Mo,   Your Hgb A1C is way better than in August.  It is in acceptable range now (less than 8), the best it has been.   ROSIE ORTEGA MD

## 2020-01-15 DIAGNOSIS — M17.0 OSTEOARTHRITIS OF BOTH KNEES, UNSPECIFIED OSTEOARTHRITIS TYPE: Primary | ICD-10-CM

## 2020-01-24 ENCOUNTER — TRANSFERRED RECORDS (OUTPATIENT)
Dept: HEALTH INFORMATION MANAGEMENT | Facility: CLINIC | Age: 55
End: 2020-01-24

## 2020-02-18 ENCOUNTER — TELEPHONE (OUTPATIENT)
Dept: FAMILY MEDICINE | Facility: CLINIC | Age: 55
End: 2020-02-18

## 2020-02-18 ENCOUNTER — MYC REFILL (OUTPATIENT)
Dept: FAMILY MEDICINE | Facility: CLINIC | Age: 55
End: 2020-02-18

## 2020-02-18 DIAGNOSIS — I82.431 ACUTE DEEP VEIN THROMBOSIS (DVT) OF POPLITEAL VEIN OF RIGHT LOWER EXTREMITY (H): ICD-10-CM

## 2020-02-18 DIAGNOSIS — E78.5 HYPERLIPIDEMIA LDL GOAL <100: ICD-10-CM

## 2020-02-18 DIAGNOSIS — N52.9 ERECTILE DYSFUNCTION, UNSPECIFIED ERECTILE DYSFUNCTION TYPE: ICD-10-CM

## 2020-02-18 DIAGNOSIS — E11.9 TYPE 2 DIABETES MELLITUS WITHOUT COMPLICATION, WITHOUT LONG-TERM CURRENT USE OF INSULIN (H): ICD-10-CM

## 2020-02-18 RX ORDER — ATORVASTATIN CALCIUM 20 MG/1
20 TABLET, FILM COATED ORAL DAILY
Qty: 30 TABLET | Refills: 0 | Status: SHIPPED | OUTPATIENT
Start: 2020-02-18 | End: 2020-03-09

## 2020-02-18 RX ORDER — SILDENAFIL CITRATE 20 MG/1
TABLET ORAL
Qty: 50 TABLET | Refills: 0 | Status: SHIPPED | OUTPATIENT
Start: 2020-02-18 | End: 2020-03-25

## 2020-02-18 NOTE — TELEPHONE ENCOUNTER
Prior Authorization Retail Medication Request    Medication/Dose: sildenafil 20mg   ICD code (if different than what is on RX): Erectile dysfunction, unspecified erectile dysfunction type [N52.9]    Previously Tried and Failed:    Rationale:  Prior auth required    Insurance Name:  [facility] [phone]  Insurance ID:  [id number]      Pharmacy Information (if different than what is on RX)  Name:    Phone:

## 2020-02-18 NOTE — TELEPHONE ENCOUNTER
Medications are being filled for 1 time refill only due to:  Patient needs to be seen because was due to return Nov 2019 for diabetic recheck. .     [employer] message sent to yessica TAMAYO RN

## 2020-02-18 NOTE — TELEPHONE ENCOUNTER
"Requested Prescriptions   Pending Prescriptions Disp Refills     empagliflozin (JARDIANCE) 25 MG TABS tablet 90 tablet 3     Sig: Take 1 tablet (25 mg) by mouth daily       Sodium Glucose Co-Transport Inhibitor Agents Passed - 2/18/2020  6:18 AM        Passed - Blood pressure less than 140/90 in past 6 months     BP Readings from Last 3 Encounters:   09/20/19 124/80   08/19/19 137/86   06/19/19 136/80                 Passed - Patient has documented LDL within the past 12 mos.     Recent Labs   Lab Test 08/19/19  1548   LDL 45             Passed - Patient has had a Microalbumin in the past 15 mos.     Recent Labs   Lab Test 02/22/19  1619   MICROL 30   UMALCR 49.92*             Passed - Patient has documented A1c within the specified period of time.     If HgbA1C is 8 or greater, it needs to be on file within the past 3 months.  If less than 8, must be on file within the past 6 months.     Recent Labs   Lab Test 11/19/19  1423   A1C 7.3*             Passed - No creatinine >1.4 or GFR <45 within the past 12 mos     Recent Labs   Lab Test 02/22/19  1609   GFRESTIMATED >90   GFRESTBLACK >90       Recent Labs   Lab Test 02/22/19  1609   CR 0.74             Passed - Medication is active on med list        Passed - Patient is age 18 or older        Passed - Patient has documented normal Potassium within the last 12 mos.     Recent Labs   Lab Test 02/22/19  1609   POTASSIUM 4.1             Passed - Recent (6 mo) or future (30 days) visit within the authorizing provider's specialty     Patient had office visit in the last 6 months or has a visit in the next 30 days with authorizing provider or within the authorizing provider's specialty.  See \"Patient Info\" tab in inbasket, or \"Choose Columns\" in Meds & Orders section of the refill encounter.            metFORMIN (GLUCOPHAGE) 1000 MG tablet 60 tablet 11     Sig: Take 1 tablet (1,000 mg) by mouth 2 times daily (with meals)       Biguanide Agents Passed - 2/18/2020  6:18 AM    " "    Passed - Blood pressure less than 140/90 in past 6 months     BP Readings from Last 3 Encounters:   09/20/19 124/80   08/19/19 137/86   06/19/19 136/80                 Passed - Patient has documented LDL within the past 12 mos.     Recent Labs   Lab Test 08/19/19  1548   LDL 45             Passed - Patient has had a Microalbumin in the past 15 mos.     Recent Labs   Lab Test 02/22/19  1619   MICROL 30   UMALCR 49.92*             Passed - Patient is age 10 or older        Passed - Patient has documented A1c within the specified period of time.     If HgbA1C is 8 or greater, it needs to be on file within the past 3 months.  If less than 8, must be on file within the past 6 months.     Recent Labs   Lab Test 11/19/19  1423   A1C 7.3*             Passed - Patient's CR is NOT>1.4 OR Patient's EGFR is NOT<45 within past 12 mos.     Recent Labs   Lab Test 02/22/19  1609   GFRESTIMATED >90   GFRESTBLACK >90       Recent Labs   Lab Test 02/22/19  1609   CR 0.74             Passed - Patient does NOT have a diagnosis of CHF.        Passed - Medication is active on med list        Passed - Recent (6 mo) or future (30 days) visit within the authorizing provider's specialty     Patient had office visit in the last 6 months or has a visit in the next 30 days with authorizing provider or within the authorizing provider's specialty.  See \"Patient Info\" tab in inbasket, or \"Choose Columns\" in Meds & Orders section of the refill encounter.            rivaroxaban ANTICOAGULANT (XARELTO) 20 MG TABS tablet 30 tablet 11     Sig: Take 1 tablet (20 mg) by mouth daily (with dinner) Start after completing the 15mg twice daily dose for the first 21 days.       Direct Oral Anticoagulant Agents Failed - 2/18/2020  6:18 AM        Failed - Normal Platelets on file in past 12 months     No lab results found.            Failed - Creatinine Clearance greater than 50 ml/min on file in past 3 mos     No lab results found.          Failed - Serum " "creatinine less than or equal to 1.4 on file in past 3 mos     Recent Labs   Lab Test 02/22/19  1609   CR 0.74             Passed - Medication is active on med list        Passed - Patient is 18 years of age or older        Passed - Recent (6 mo) or future (30 days) visit within the authorizing provider's specialty        sildenafil (REVATIO) 20 MG tablet 50 tablet 11     Sig: Sidenafil (Viagra) comes in 20mg strength pills. Take as many pills as needed up to maximum of 5 pills at a time prior to intercourse.       Erectile Dysfuction Protocol Passed - 2/18/2020  6:18 AM        Passed - Absence of nitrates on medication list        Passed - Absence of Alpha Blockers on Med list        Passed - Recent (12 mo) or future (30 days) visit within the authorizing provider's specialty     Patient has had an office visit with the authorizing provider or a provider within the authorizing providers department within the previous 12 mos or has a future within next 30 days. See \"Patient Info\" tab in inbasket, or \"Choose Columns\" in Meds & Orders section of the refill encounter.              Passed - Medication is active on med list        Passed - Patient is age 18 or older        atorvastatin (LIPITOR) 20 MG tablet 90 tablet 3     Sig: Take 1 tablet (20 mg) by mouth daily       Statins Protocol Passed - 2/18/2020  6:18 AM        Passed - LDL on file in past 12 months     Recent Labs   Lab Test 08/19/19  1548   LDL 45             Passed - No abnormal creatine kinase in past 12 months     No lab results found.             Passed - Recent (12 mo) or future (30 days) visit within the authorizing provider's specialty     Patient has had an office visit with the authorizing provider or a provider within the authorizing providers department within the previous 12 mos or has a future within next 30 days. See \"Patient Info\" tab in inbasket, or \"Choose Columns\" in Meds & Orders section of the refill encounter.              Passed - " Medication is active on med list        Passed - Patient is age 18 or older      ATORVASTATIN 20 MG  Last Written Prescription Date:  2/28/19  Last Fill Quantity: 90,  # refills: 3   Last office visit: 9/20/2019 with prescribing provider:  Leaf   Future Office Visit:      JARDIANCE 25 MG TABS  Last Written Prescription Date:  2/22/19  Last Fill Quantity: 90,  # refills: 3   Last office visit: 9/20/2019 with prescribing provider:  LEAF  Future Office Visit:      METFORMIN 1000 MG  Last Written Prescription Date:  2/22/19  Last Fill Quantity: 60,  # refills: 11   Last office visit: 9/20/2019 with prescribing provider:  LEAF   Future Office Visit:      XARELTO 20 MG TAB  Last Written Prescription Date:  2/22/19  Last Fill Quantity: 30,  # refills: 11   Last office visit: 9/20/2019 with prescribing provider:  LEAF   Future Office Visit:      SILDENAFIL 20 MG  Last Written Prescription Date:  2/22/19  Last Fill Quantity: 50,  # refills: 11   Last office visit: 9/20/2019 with prescribing provider:  LEAF   Future Office Visit:

## 2020-02-20 NOTE — TELEPHONE ENCOUNTER
Central Prior Authorization Team  Phone: [phone]    PA Initiation    Medication: sildenafil 20mg   Insurance Company: [employer] - Phone [phone] Fax [phone]  Pharmacy Filling the Rx: [place], [place] - [address] [place] [address]  Filling Pharmacy Phone: [phone]  Filling Pharmacy Fax:    Start Date: 2/20/2020

## 2020-02-21 NOTE — TELEPHONE ENCOUNTER
PRIOR AUTHORIZATION DENIED    Medication: sildenafil 20mg- DENIED     Denial Date: 2/21/2020    Denial Rational: Patient's diagnosis does meet criteria for approval. Coverage is provided for a diagnosis of Pulmonary arterial Hypertension.      Appeal Information: If provider would like to appeal please provide a letter of medical necessity.

## 2020-02-24 ENCOUNTER — HEALTH MAINTENANCE LETTER (OUTPATIENT)
Age: 55
End: 2020-02-24

## 2020-03-09 ENCOUNTER — OFFICE VISIT (OUTPATIENT)
Dept: FAMILY MEDICINE | Facility: CLINIC | Age: 55
End: 2020-03-09
Payer: COMMERCIAL

## 2020-03-09 VITALS
RESPIRATION RATE: 18 BRPM | HEIGHT: 73 IN | HEART RATE: 88 BPM | DIASTOLIC BLOOD PRESSURE: 84 MMHG | SYSTOLIC BLOOD PRESSURE: 130 MMHG | BODY MASS INDEX: 41.72 KG/M2 | WEIGHT: 314.8 LBS | TEMPERATURE: 97.8 F

## 2020-03-09 DIAGNOSIS — M17.0 OSTEOARTHRITIS OF BOTH KNEES, UNSPECIFIED OSTEOARTHRITIS TYPE: ICD-10-CM

## 2020-03-09 DIAGNOSIS — E78.5 HYPERLIPIDEMIA LDL GOAL <100: ICD-10-CM

## 2020-03-09 DIAGNOSIS — E66.01 MORBID OBESITY (H): ICD-10-CM

## 2020-03-09 DIAGNOSIS — E11.9 TYPE 2 DIABETES MELLITUS WITHOUT COMPLICATION, WITHOUT LONG-TERM CURRENT USE OF INSULIN (H): Primary | ICD-10-CM

## 2020-03-09 LAB
ANION GAP SERPL CALCULATED.3IONS-SCNC: 6 MMOL/L (ref 3–14)
BUN SERPL-MCNC: 18 MG/DL (ref 7–30)
CALCIUM SERPL-MCNC: 9.5 MG/DL (ref 8.5–10.1)
CHLORIDE SERPL-SCNC: 104 MMOL/L (ref 94–109)
CO2 SERPL-SCNC: 29 MMOL/L (ref 20–32)
CREAT SERPL-MCNC: 0.74 MG/DL (ref 0.66–1.25)
CREAT UR-MCNC: 101 MG/DL
GFR SERPL CREATININE-BSD FRML MDRD: >90 ML/MIN/{1.73_M2}
GLUCOSE SERPL-MCNC: 98 MG/DL (ref 70–99)
HBA1C MFR BLD: 7.4 % (ref 0–5.6)
MICROALBUMIN UR-MCNC: 35 MG/L
MICROALBUMIN/CREAT UR: 34.26 MG/G CR (ref 0–17)
POTASSIUM SERPL-SCNC: 4.3 MMOL/L (ref 3.4–5.3)
SODIUM SERPL-SCNC: 139 MMOL/L (ref 133–144)

## 2020-03-09 PROCEDURE — 80048 BASIC METABOLIC PNL TOTAL CA: CPT | Performed by: FAMILY MEDICINE

## 2020-03-09 PROCEDURE — 99214 OFFICE O/P EST MOD 30 MIN: CPT | Performed by: FAMILY MEDICINE

## 2020-03-09 PROCEDURE — 83036 HEMOGLOBIN GLYCOSYLATED A1C: CPT | Performed by: FAMILY MEDICINE

## 2020-03-09 PROCEDURE — 36415 COLL VENOUS BLD VENIPUNCTURE: CPT | Performed by: FAMILY MEDICINE

## 2020-03-09 PROCEDURE — 82043 UR ALBUMIN QUANTITATIVE: CPT | Performed by: FAMILY MEDICINE

## 2020-03-09 RX ORDER — ATORVASTATIN CALCIUM 20 MG/1
20 TABLET, FILM COATED ORAL DAILY
Qty: 30 TABLET | Refills: 11 | Status: SHIPPED | OUTPATIENT
Start: 2020-03-09 | End: 2021-02-19

## 2020-03-09 ASSESSMENT — MIFFLIN-ST. JEOR: SCORE: 2331.8

## 2020-03-09 NOTE — PATIENT INSTRUCTIONS
ASSESSMENT/PLAN:                                                      (E11.9) Type 2 diabetes mellitus without complication, without long-term current use of insulin (H)  (primary encounter diagnosis)  Comment: bing Delaney  Plan: Hemoglobin A1c, Basic metabolic panel, Albumin         Random Urine Quantitative with Creat Ratio,         empagliflozin (JARDIANCE) 25 MG TABS tablet,         liraglutide - Weight Management (SAXENDA) 18         MG/3ML pen, metFORMIN (GLUCOPHAGE) 1000 MG         tablet          Change liraglutide if covered by insurance for higher doses to help with weight loss.    INcrease to 2.4mg daily injections for a week, then 3.0mg daily.   Continue the metformin and empagliflozin (Jardiance) as you have been .   Recheck in 6 months.     (E78.5) Hyperlipidemia LDL goal <100  Comment:   Plan: atorvastatin (LIPITOR) 20 MG tablet        REfills    (E66.01) Morbid obesity (H)  Comment: difficulty losing weight  Plan: If the higher doses of liraglutide not covered, there are other medications we could try.     (M17.0) Osteoarthritis of both knees, unspecified osteoarthritis type  Comment: continuing pain in right knee  Plan: hyaluronic acid injections injections, knee brace, surgery if able to lose more weight.

## 2020-03-09 NOTE — PROGRESS NOTES
"SUBJECTIVE: Chepe Elkins is a 54 year old male  Chief Complaint   Patient presents with     Diabetes      In for diabetes mellitus check.   \"I'm still fighting with my weight\".   He has cut back on carbs.    He has seen Tiffany, diabetic educator.     Diabetes Follow-up    How often are you checking your blood sugar? Two times daily.  Glucometer range within the past month=115-189.  Staying under 200.    Taking metformin 1000mg twice daily.   Taking empagliflozin (Jardiance) 25mg daily.  Takes liraglutide 1.8mg daily.  No side effects noted.   Lab Results   Component Value Date    A1C 7.4 03/09/2020     Blood sugar testing frequency justification:    What time of day are you checking your blood sugars (select all that apply)?  Before meals  Have you had any blood sugars above 200?  No  Have you had any blood sugars below 70?  No    What symptoms do you notice when your blood sugar is low?  None    What concerns do you have today about your diabetes? None     Do you have any of these symptoms? (Select all that apply)  No numbness or tingling in feet.  No redness, sores or blisters on feet.  No complaints of excessive thirst.  No reports of blurry vision.  No significant changes to weight.    Have you had a diabetic eye exam in the last 12 months? No                Hyperlipidemia Follow-Up      Are you regularly taking any medication or supplement to lower your cholesterol?   Yes- taking    Are you having muscle aches or other side effects that you think could be caused by your cholesterol lowering medication?  No    Hypertension Follow-up      Do you check your blood pressure regularly outside of the clinic? No     Are you following a low salt diet? No    Are your blood pressures ever more than 140 on the top number (systolic) OR more   than 90 on the bottom number (diastolic), for example 140/90? No    BP Readings from Last 2 Encounters:   09/20/19 124/80   08/19/19 137/86     Hemoglobin A1C (%)   Date Value "   11/19/2019 7.3 (H)   08/19/2019 9.7 (H)     LDL Cholesterol Calculated (mg/dL)   Date Value   08/19/2019 45   02/22/2019     Cannot estimate LDL when triglyceride exceeds 400 mg/dL     LDL Cholesterol Direct (mg/dL)   Date Value   02/22/2019 100 (H)         How many servings of fruits and vegetables do you eat daily?  2    On average, how many sweetened beverages do you drink each day (Examples: soda, juice, sweet tea, etc.  Do NOT count diet or artificially sweetened beverages)?   0    How many days per week do you exercise enough to make your heart beat faster? none    How many minutes a day do you exercise enough to make your heart beat faster?     How many days per week do you miss taking your medication? 0      Weight since last visit?   Wt Readings from Last 5 Encounters:   03/09/20 142.8 kg (314 lb 12.8 oz)   11/19/19 136.5 kg (301 lb)   09/20/19 140.6 kg (310 lb)   09/17/19 139.8 kg (308 lb 3.2 oz)   08/19/19 141.1 kg (311 lb)      History   Smoking Status     Former Smoker     Packs/day: 1.00     Years: 25.00     Types: Cigarettes     Quit date: 2/26/2010   Smokeless Tobacco     Never Used     Comment: started at age 19       Past medical history reviewed and updated    Patient Active Problem List    Diagnosis Date Noted     Hyperlipidemia LDL goal <100 02/22/2019     Priority: Medium     Acute deep vein thrombosis (DVT) of popliteal vein of right lower extremity (H) 03/28/2018     Priority: Medium     3/28/2018:Unprovoked.  PLAN: indefinite anticoagulation.        Osteoarthritis of both knees, unspecified osteoarthritis type 03/28/2018     Priority: Medium     Type 2 diabetes mellitus without complication, without long-term current use of insulin (H) 03/19/2018     Priority: Medium     Morbid obesity (H) 03/19/2018     Priority: Medium     Current Outpatient Medications   Medication Sig Dispense Refill     ASPIRIN NOT PRESCRIBED (INTENTIONAL) Please choose reason not prescribed, below       atorvastatin  "20 MG PO tablet Take 1 tablet (20 mg) by mouth daily 30 tablet 0     blood glucose (NO BRAND SPECIFIED) lancets standard Use to test blood sugar 2-3 times daily or as directed. 100 each 3     empagliflozin 25 MG PO TABS tablet Take 1 tablet (25 mg) by mouth daily 30 tablet 0     liraglutide (VICTOZA) 18 MG/3ML solution Take 1.8 mg daily in the morning 9 mL 5     metFORMIN 1000 MG PO tablet Take 1 tablet (1,000 mg) by mouth 2 times daily (with meals) 60 tablet 0     Multiple Vitamin (MULTI VITAMIN MENS PO) Take 1 tablet by mouth daily       rivaroxaban ANTICOAGULANT 20 MG PO TABS tablet Take 1 tablet (20 mg) by mouth daily (with dinner) Start after completing the 15mg twice daily dose for the first 21 days. 30 tablet 0     sildenafil 20 MG PO tablet Sidenafil (Viagra) comes in 20mg strength pills. Take as many pills as needed up to maximum of 5 pills at a time prior to intercourse. 50 tablet 0     blood glucose monitoring (NO BRAND SPECIFIED) meter device kit Use to test blood sugar 2-3 times daily or as directed. 1 kit 0     blood glucose monitoring (NO BRAND SPECIFIED) test strip Use to test blood sugar 2-3 times daily or as directed. 100 strip 11     ROS:General: No change in weight, sleep or appetite.  Normal energy.  No fever or chills  Resp: No coughing, wheezing or shortness of breath  CV: No chest pains or palpitations  GI: No nausea, vomiting,  heartburn, abdominal pain, diarrhea, constipation or change in bowel habits  : No urinary frequency or dysuria, bladder or kidney problems  Musculoskeletal: POSITIVE  for:, pain right knee.  Will be trying hyaluronic acid injections.  HE has a new brace.   Some aching in both legs.  Wearing compression stockings.    Psychiatric: No problems with anxiety, depression or mental health  sildenafil not helping.  Taking 100mg per dose.     OBJECTIVE:Blood pressure 130/84, pulse 88, temperature 97.8  F (36.6  C), resp. rate 18, height 1.854 m (6' 1\"), weight 142.8 kg (314 " lb 12.8 oz). BMI=Body mass index is 41.53 kg/m .  GENERAL APPEARANCE ADULT: Alert, no acute distress, morbidly obese   Foot exam:no exam     ASSESSMENT/PLAN:                                                      (E11.9) Type 2 diabetes mellitus without complication, without long-term current use of insulin (H)  (primary encounter diagnosis)  Comment: bing Delaney  Plan: Hemoglobin A1c, Basic metabolic panel, Albumin         Random Urine Quantitative with Creat Ratio,         empagliflozin (JARDIANCE) 25 MG TABS tablet,         liraglutide - Weight Management (SAXENDA) 18         MG/3ML pen, metFORMIN (GLUCOPHAGE) 1000 MG         tablet          Change liraglutide if covered by insurance for higher doses to help with weight loss.    INcrease to 2.4mg daily injections for a week, then 3.0mg daily.   Continue the metformin and empagliflozin (Jardiance) as you have been .   Recheck in 6 months.     (E78.5) Hyperlipidemia LDL goal <100  Comment:   Plan: atorvastatin (LIPITOR) 20 MG tablet        REfills    (E66.01) Morbid obesity (H)  Comment: difficulty losing weight  Plan: If the higher doses of liraglutide not covered, there are other medications we could try.     (M17.0) Osteoarthritis of both knees, unspecified osteoarthritis type  Comment: continuing pain in right knee  Plan: hyaluronic acid injections injections, knee brace, surgery if able to lose more weight.      Diabetes Type II, A1c Controlled, non-insulin dependent   Associated with the following complications:none

## 2020-03-09 NOTE — NURSING NOTE
"Chief Complaint   Patient presents with     Diabetes       Initial /84   Pulse 88   Temp 97.8  F (36.6  C)   Resp 18   Ht 1.854 m (6' 1\")   Wt 142.8 kg (314 lb 12.8 oz)   BMI 41.53 kg/m   Estimated body mass index is 41.53 kg/m  as calculated from the following:    Height as of this encounter: 1.854 m (6' 1\").    Weight as of this encounter: 142.8 kg (314 lb 12.8 oz).    Patient presents to the clinic using     Health Maintenance that is potentially due pending provider review:          Is there anyone who you would like to be able to receive your results?   If yes have patient fill out SINDY    "

## 2020-03-10 NOTE — RESULT ENCOUNTER NOTE
Alejo Mo,   Urine tests show positive MAC (microalbumin/creatinine ratio) meaning protein leaking from the kidneys into the urine. This is a sign of some kidney damage from the diabetes mellitus. It has been present in the past.   The blood chemistries (Basic metabolic panel) are all normal including electrolytes (salt balances in the blood), blood glucose and kidney tests.   HgbA1C is OK indicating reasonable diabetic control.   ROSIE ORTEGA MD

## 2020-03-19 ENCOUNTER — TELEPHONE (OUTPATIENT)
Dept: FAMILY MEDICINE | Facility: CLINIC | Age: 55
End: 2020-03-19

## 2020-03-19 DIAGNOSIS — E11.9 TYPE 2 DIABETES MELLITUS WITHOUT COMPLICATION, WITHOUT LONG-TERM CURRENT USE OF INSULIN (H): ICD-10-CM

## 2020-03-19 DIAGNOSIS — I82.431 ACUTE DEEP VEIN THROMBOSIS (DVT) OF POPLITEAL VEIN OF RIGHT LOWER EXTREMITY (H): ICD-10-CM

## 2020-03-19 RX ORDER — PEN NEEDLE, DIABETIC 32GX 5/32"
NEEDLE, DISPOSABLE MISCELLANEOUS
Qty: 100 EACH | Refills: 4 | Status: SHIPPED | OUTPATIENT
Start: 2020-03-19 | End: 2021-04-02

## 2020-03-19 RX ORDER — LIRAGLUTIDE 6 MG/ML
INJECTION SUBCUTANEOUS
Qty: 9 ML | Refills: 5 | Status: SHIPPED | OUTPATIENT
Start: 2020-03-19 | End: 2020-09-11

## 2020-03-19 RX ORDER — LIRAGLUTIDE 6 MG/ML
INJECTION SUBCUTANEOUS
Qty: 9 ML | Refills: 5 | OUTPATIENT
Start: 2020-03-19

## 2020-03-19 RX ORDER — RIVAROXABAN 20 MG/1
TABLET, FILM COATED ORAL
Qty: 30 TABLET | Refills: 11 | Status: SHIPPED | OUTPATIENT
Start: 2020-03-19 | End: 2021-03-22

## 2020-03-19 NOTE — TELEPHONE ENCOUNTER
"Requested Prescriptions   Pending Prescriptions Disp Refills     liraglutide (VICTOZA PEN) 18 MG/3ML solution [Pharmacy Med Name: VICTOZA 18MG/3ML SOPN] 9 mL 5     Sig: TAKE 1.8 MG DAILY IN THE MORNING       GLP-1 Agonists Protocol Failed - 3/19/2020 12:39 PM        Failed - Medication is active on med list        Passed - Blood pressure less than 140/90 in past 6 months     BP Readings from Last 3 Encounters:   03/09/20 130/84   09/20/19 124/80   08/19/19 137/86                 Passed - LDL on file in past 12 months     Recent Labs   Lab Test 08/19/19  1548   LDL 45             Passed - Microalbumin on file in past 12 months     Recent Labs   Lab Test 03/09/20  1808   MICROL 35   UMALCR 34.26*             Passed - HgbA1C in past 3 or 6 months     If HgbA1C is 8 or greater, it needs to be on file within the past 3 months.  If less than 8, must be on file within the past 6 months.     Recent Labs   Lab Test 03/09/20  1802   A1C 7.4*             Passed - Patient is age 18 or older        Passed - Normal serum creatinine on file in past 12 months     Recent Labs   Lab Test 03/09/20  1802   CR 0.74       Ok to refill medication if creatinine is low          Passed - Recent (6 mo) or future (30 days) visit within the authorizing provider's specialty     Patient had office visit in the last 6 months or has a visit in the next 30 days with authorizing provider.  See \"Patient Info\" tab in inbasket, or \"Choose Columns\" in Meds & Orders section of the refill encounter.      Last Written Prescription Date:  3/9/20  Last Fill Quantity: 5,  # refills: 11   Last office visit: 3/9/2020 with prescribing provider:     Future Office Visit:                 BD JAVI U/F 32G X 4 MM insulin pen needle [Pharmacy Med Name: BD PEN NEEDLE JAVI  32G X 4 MM MISC] 100 each 4     Sig: USE DAILY WITH VICTOZA       Diabetic Supplies Protocol Failed - 3/19/2020 12:39 PM        Failed - Medication is active on med list        Passed - Patient is 18 " "years of age or older        Passed - Recent (6 mo) or future (30 days) visit within the authorizing provider's specialty     Patient had office visit in the last 6 months or has a visit in the next 30 days with authorizing provider.  See \"Patient Info\" tab in inbasket, or \"Choose Columns\" in Meds & Orders section of the refill encounter.    This patient wants a new prescription              XARELTO ANTICOAGULANT 20 MG TABS tablet [Pharmacy Med Name: XARELTO 20MG TABS] 30 tablet 0     Sig: TAKE ONE TABLET BY MOUTH ONCE DAILY WITH DINNER       Direct Oral Anticoagulant Agents Failed - 3/19/2020 12:39 PM        Failed - Normal Platelets on file in past 12 months     No lab results found.            Passed - Medication is active on med list        Passed - Creatinine Clearance greater than 50 ml/min on file in past 3 mos     No lab results found.          Passed - Serum creatinine less than or equal to 1.4 on file in past 3 mos     Recent Labs   Lab Test 03/09/20  1802   CR 0.74       Ok to refill medication if creatinine is low          Passed - Patient is 18 years of age or older        Passed - Recent (6 mo) or future (30 days) visit within the authorizing provider's specialty           Last Written Prescription Date:  2/18/20  Last Fill Quantity: 30,  # refills: 0   Last office visit: 3/9/2020 with prescribing provider:     Future Office Visit:      "

## 2020-03-19 NOTE — TELEPHONE ENCOUNTER
Pt at pharmacy and would like to Picl up today  Note      [facility] is requesting a new prescription for Victoza.  Patient has been taking Victoza in the past but the most recent prescription sent over was written for Saxenda.  His Insurance does not cover Saxenda. Please sned new RX for Victoza.        Thanks,

## 2020-03-19 NOTE — TELEPHONE ENCOUNTER
[facility] is requesting a new prescription for Victoza.  Patient has been taking Victoza in the past but the most recent prescription sent over was written for Saxenda.  His Insurance does not cover Saxenda. Please sned new RX for Victoza.      Thanks,  Kim Hodgkins  Float Technician

## 2020-03-19 NOTE — TELEPHONE ENCOUNTER
Routing refill request for rivaroxaban to provider for review/approval because: Labs not current:  No PLT on file    Routing refill request for BD JAVI U/F 32G X 4 MM insulin pen needle to provider for review/approval because: Supply not active on patient's medication list    Sophia TAMAYO RN

## 2020-03-24 DIAGNOSIS — N52.9 ERECTILE DYSFUNCTION, UNSPECIFIED ERECTILE DYSFUNCTION TYPE: ICD-10-CM

## 2020-03-24 NOTE — TELEPHONE ENCOUNTER
" sildenafil (REVATIO) 20 MG tablet      Last Written Prescription Date:  2/18/20  Last Fill Quantity: 50,  # refills: 0   Last office visit: 3/9/2020 with prescribing provider:  rosemary   Future Office Visit:    Requested Prescriptions   Pending Prescriptions Disp Refills     sildenafil (REVATIO) 20 MG tablet 50 tablet 0     Sig: Sidenafil (Viagra) comes in 20mg strength pills. Take as many pills as needed up to maximum of 5 pills at a time prior to intercourse.       Erectile Dysfuction Protocol Passed - 3/24/2020  4:51 PM        Passed - Absence of nitrates on medication list        Passed - Absence of Alpha Blockers on Med list        Passed - Recent (12 mo) or future (30 days) visit within the authorizing provider's specialty     Patient has had an office visit with the authorizing provider or a provider within the authorizing providers department within the previous 12 mos or has a future within next 30 days. See \"Patient Info\" tab in inbasket, or \"Choose Columns\" in Meds & Orders section of the refill encounter.              Passed - Medication is active on med list        Passed - Patient is age 18 or older             "

## 2020-03-25 RX ORDER — SILDENAFIL CITRATE 20 MG/1
TABLET ORAL
Qty: 50 TABLET | Refills: 0 | Status: SHIPPED | OUTPATIENT
Start: 2020-03-25 | End: 2020-09-28

## 2020-07-25 ENCOUNTER — MYC REFILL (OUTPATIENT)
Dept: FAMILY MEDICINE | Facility: CLINIC | Age: 55
End: 2020-07-25

## 2020-07-25 DIAGNOSIS — E11.9 TYPE 2 DIABETES MELLITUS WITHOUT COMPLICATION, WITHOUT LONG-TERM CURRENT USE OF INSULIN (H): ICD-10-CM

## 2020-09-28 ENCOUNTER — OFFICE VISIT (OUTPATIENT)
Dept: FAMILY MEDICINE | Facility: CLINIC | Age: 55
End: 2020-09-28
Payer: COMMERCIAL

## 2020-09-28 VITALS
SYSTOLIC BLOOD PRESSURE: 120 MMHG | TEMPERATURE: 97.5 F | HEIGHT: 73 IN | BODY MASS INDEX: 40.95 KG/M2 | HEART RATE: 80 BPM | RESPIRATION RATE: 16 BRPM | WEIGHT: 309 LBS | DIASTOLIC BLOOD PRESSURE: 80 MMHG

## 2020-09-28 DIAGNOSIS — E66.01 MORBID OBESITY (H): ICD-10-CM

## 2020-09-28 DIAGNOSIS — E11.9 TYPE 2 DIABETES MELLITUS WITHOUT COMPLICATION, WITHOUT LONG-TERM CURRENT USE OF INSULIN (H): ICD-10-CM

## 2020-09-28 DIAGNOSIS — N18.1 CKD (CHRONIC KIDNEY DISEASE) STAGE 1, GFR 90 ML/MIN OR GREATER: ICD-10-CM

## 2020-09-28 DIAGNOSIS — I82.431 ACUTE DEEP VEIN THROMBOSIS (DVT) OF POPLITEAL VEIN OF RIGHT LOWER EXTREMITY (H): ICD-10-CM

## 2020-09-28 DIAGNOSIS — Z00.00 ROUTINE GENERAL MEDICAL EXAMINATION AT A HEALTH CARE FACILITY: Primary | ICD-10-CM

## 2020-09-28 DIAGNOSIS — E78.5 HYPERLIPIDEMIA LDL GOAL <100: ICD-10-CM

## 2020-09-28 DIAGNOSIS — Z12.5 SCREENING FOR PROSTATE CANCER: ICD-10-CM

## 2020-09-28 DIAGNOSIS — Z12.11 COLON CANCER SCREENING: ICD-10-CM

## 2020-09-28 DIAGNOSIS — N52.9 ERECTILE DYSFUNCTION, UNSPECIFIED ERECTILE DYSFUNCTION TYPE: ICD-10-CM

## 2020-09-28 LAB
CHOLEST SERPL-MCNC: 146 MG/DL
HBA1C MFR BLD: 7.9 % (ref 0–5.6)
HDLC SERPL-MCNC: 48 MG/DL
LDLC SERPL CALC-MCNC: 51 MG/DL
NONHDLC SERPL-MCNC: 98 MG/DL
PSA SERPL-ACNC: 0.71 UG/L (ref 0–4)
TRIGL SERPL-MCNC: 233 MG/DL

## 2020-09-28 PROCEDURE — 99207 C FOOT EXAM  NO CHARGE: CPT | Mod: 25 | Performed by: FAMILY MEDICINE

## 2020-09-28 PROCEDURE — 80061 LIPID PANEL: CPT | Performed by: FAMILY MEDICINE

## 2020-09-28 PROCEDURE — 83036 HEMOGLOBIN GLYCOSYLATED A1C: CPT | Performed by: FAMILY MEDICINE

## 2020-09-28 PROCEDURE — 99213 OFFICE O/P EST LOW 20 MIN: CPT | Mod: 25 | Performed by: FAMILY MEDICINE

## 2020-09-28 PROCEDURE — G0103 PSA SCREENING: HCPCS | Performed by: FAMILY MEDICINE

## 2020-09-28 PROCEDURE — 36415 COLL VENOUS BLD VENIPUNCTURE: CPT | Performed by: FAMILY MEDICINE

## 2020-09-28 PROCEDURE — 90682 RIV4 VACC RECOMBINANT DNA IM: CPT | Performed by: FAMILY MEDICINE

## 2020-09-28 PROCEDURE — 99396 PREV VISIT EST AGE 40-64: CPT | Mod: 25 | Performed by: FAMILY MEDICINE

## 2020-09-28 PROCEDURE — 90471 IMMUNIZATION ADMIN: CPT | Performed by: FAMILY MEDICINE

## 2020-09-28 RX ORDER — SILDENAFIL CITRATE 20 MG/1
TABLET ORAL
Qty: 50 TABLET | Refills: 11 | Status: SHIPPED | OUTPATIENT
Start: 2020-09-28

## 2020-09-28 RX ORDER — LISINOPRIL 10 MG/1
10 TABLET ORAL DAILY
Qty: 30 TABLET | Refills: 0 | Status: SHIPPED | OUTPATIENT
Start: 2020-09-28 | End: 2020-10-30

## 2020-09-28 RX ORDER — LIRAGLUTIDE 6 MG/ML
INJECTION SUBCUTANEOUS
Qty: 3 ML | Refills: 11 | Status: SHIPPED | OUTPATIENT
Start: 2020-09-28 | End: 2021-10-07

## 2020-09-28 ASSESSMENT — ENCOUNTER SYMPTOMS
DIARRHEA: 0
CONSTIPATION: 0
COUGH: 0
ABDOMINAL PAIN: 0
CHILLS: 0
HEMATOCHEZIA: 0
DIZZINESS: 0
HEMATURIA: 0

## 2020-09-28 ASSESSMENT — PATIENT HEALTH QUESTIONNAIRE - PHQ9
SUM OF ALL RESPONSES TO PHQ QUESTIONS 1-9: 1
SUM OF ALL RESPONSES TO PHQ QUESTIONS 1-9: 1
10. IF YOU CHECKED OFF ANY PROBLEMS, HOW DIFFICULT HAVE THESE PROBLEMS MADE IT FOR YOU TO DO YOUR WORK, TAKE CARE OF THINGS AT HOME, OR GET ALONG WITH OTHER PEOPLE: NOT DIFFICULT AT ALL

## 2020-09-28 ASSESSMENT — MIFFLIN-ST. JEOR: SCORE: 2292.55

## 2020-09-28 NOTE — PROGRESS NOTES
SUBJECTIVE:   CC: Chepe Elkins is an 55 year old male who presents for preventative health visit.   Chief Complaint   Patient presents with     Physical      Last clinic visit: 3/9 for diabetes mellitus.   Due for follow-up.   Checks glucometers once daily varying times.  Usually 110-180.  Glucometer range within the past month=109-210.  Lab Results   Component Value Date    A1C 7.4 03/09/2020   Taking liraglutide 1.8mg daily.  metformin 1000mg twice daily.  empagliflozin (Jardiance) 25mg daily.  No side effects from medication.   Weight down 5#  Wt Readings from Last 5 Encounters:   09/28/20 140.2 kg (309 lb)   03/09/20 142.8 kg (314 lb 12.8 oz)   11/19/19 136.5 kg (301 lb)   09/20/19 140.6 kg (310 lb)   09/17/19 139.8 kg (308 lb 3.2 oz)      Taking rivaroxaban (Xarelto) daily.   sildenafil was helpful for Erectile dysfunction.  Ran out.   Takes atorvastatin for cholesterol.    Regular taking medications.     No specific questions.       Patient has been advised of split billing requirements and indicates understanding: Yes  Healthy Habits:     Getting at least 3 servings of Calcium per day:  Yes    Bi-annual eye exam:  Yes    Dental care twice a year:  Yes    Sleep apnea or symptoms of sleep apnea:  None    Diet:  Diabetic    Frequency of exercise:  None    Taking medications regularly:  Yes    Medication side effects:  None    PHQ-2 Total Score: 3    Additional concerns today:  No  Exercise:none  Occupation: construction.      Today's PHQ-2 Score:   PHQ-2 ( 1999 Pf[employer]) 9/28/2020   Q1: Little interest or pleasure in doing things 2   Q2: Feeling down, depressed or hopeless 1   PHQ-2 Score 3   Q1: Little interest or pleasure in doing things More than half the days   Q2: Feeling down, depressed or hopeless Several days   PHQ-2 Score 3   Gets down with decline in physical health.  Pain gets him down.  Pain in both legs.  Aches in legs.    He has seen ortho in the past.  Has not yet tried injections.   PHQ9 score  today= 1    Abuse: Current or Past(Physical, Sexual or Emotional)- No  Do you feel safe in your environment? Yes  Social History     Tobacco Use     Smoking status: Former Smoker     Packs/day: 1.00     Years: 25.00     Pack years: 25.00     Types: Cigarettes     Last attempt to quit: 2/26/2010     Years since quitting: 10.5     Smokeless tobacco: Never Used     Tobacco comment: started at age 19   Substance Use Topics     Alcohol use: Yes     Comment: rarely     If you drink alcohol do you typically have >3 drinks per day or >7 drinks per week? No    Alcohol Use 9/28/2020   Prescreen: >3 drinks/day or >7 drinks/week? Not Applicable       Last PSA:   PSA   Date Value Ref Range Status   02/27/2018 0.99 0 - 4 ug/L Final     Comment:     Assay Method:  Chemiluminescence using Siemens Vista analyzer     Patient Active Problem List    Diagnosis Date Noted     Hyperlipidemia LDL goal <100 02/22/2019     Priority: Medium     Acute deep vein thrombosis (DVT) of popliteal vein of right lower extremity (H) 03/28/2018     Priority: Medium     3/28/2018:Unprovoked.  PLAN: indefinite anticoagulation.        Osteoarthritis of both knees, unspecified osteoarthritis type 03/28/2018     Priority: Medium     Type 2 diabetes mellitus without complication, without long-term current use of insulin (H) 03/19/2018     Priority: Medium     Morbid obesity (H) 03/19/2018     Priority: Medium        Family history:  CV disease: no  Prostate cancer: no  Colon cancer: no    Multivitamin or Vit D use: MVI    Vaccines:currenet tetanus.  Flu shot today.      Past Colon cancer screening:May, 2019    Review of Systems   Constitutional: Negative for chills.   HENT: Negative for congestion.    Respiratory: Negative for cough.    Cardiovascular: Negative for chest pain.   Gastrointestinal: Negative for abdominal pain, constipation, diarrhea and hematochezia.   Genitourinary: Negative for hematuria.   Neurological: Negative for dizziness.     OBJECTIVE:  "                                                   OBJECTIVE:Blood pressure 120/80, pulse 80, temperature 97.5  F (36.4  C), temperature source Tympanic, resp. rate 16, height 1.842 m (6' 0.5\"), weight 140.2 kg (309 lb). BMI=Body mass index is 41.33 kg/m .  GENERAL APPEARANCE ADULT: Alert, no acute distress, morbidly obese  EYES: PERRL, EOM normal, conjunctiva and lids normal  HENT: Ears and TMs normal, oral mucosa and posterior oropharynx normal  NECK: No adenopathy,masses or thyromegaly  RESP: lungs clear to auscultation   CV: normal rate, regular rhythm, no murmur or gallop  ABDOMEN: soft, no organomegaly, masses or tenderness, hernia periumbilical small, diastasis recti   (male): declined   MS: extremities normal, no peripheral edema  MS: extremities normal, no peripheral edema  foot exam normal DP and PT pulses, no trophic changes or ulcerative lesions and normal monofilament exam     ASSESSMENT/PLAN:                                                    Lifestyle recommendations:good job on losing weight!  keep working on losing weight (ideal BMI-body mass index is <25)  The following exams/tests were recommended and discussed for health maintenance:  FIT testing to check for hidden blood in the stool is a colon cancer screening test which should be done once yearly if normal.  If abnormal, a colonoscopy is recommended.    Prostate cancer screening is optional starting at age 50 if normal risk, age 40 or 45 for high risk men (strong family history or blacks.)  Screening can include digital rectal exam and PSA blood test.     (Z00.00) Routine general medical examination at a health care facility  (primary encounter diagnosis)    (E11.9) Type 2 diabetes mellitus without complication, without long-term current use of insulin (H)  Comment: due for follow-up   Plan: C FOOT EXAM  NO CHARGE, Hemoglobin A1c,         liraglutide (VICTOZA PEN) 18 MG/3ML solution        If Hgb A1C is OK, recheck in 6 months.  If Hgb A1C " "is high ow I make changes, then follow-up in 3 months.     (Z12.11) Colon cancer screening  Comment:   Plan: Fecal colorectal cancer screen FIT        Complete FIT colon cancer screening test and send in the mail.      (N18.1) CKD (chronic kidney disease) stage 1, GFR 90 ml/min or greater  Comment: YOu have signs of early damage to kidneys from the diabetes mellitus.   Plan: lisinopril (ZESTRIL) 10 MG tablet        Good control of diabetes mellitus, blood pressure <140/90 and the ACE category drugs like lisinopril can all help prevent further kidney damage.   I recommend adding lisinopril 10mg daily to prevent further kidney damage.   Rechcek blood tests-./pot and creatinine in 2 weeks and blood pressure recheck to make sure blood pressure not too low and no side effects from taking medication.     (I82.431) Acute deep vein thrombosis (DVT) of popliteal vein of right lower extremity (H)  Comment:   Plan: No change in current treatment plan.     (N52.9) Erectile dysfunction, unspecified erectile dysfunction type  Comment:   Plan: sildenafil (REVATIO) 20 MG tablet        Refills as needed    (E66.01) Morbid obesity (H)    (E78.5) Hyperlipidemia LDL goal <100  Comment: due for recheck  Plan: Lipid panel reflex to direct LDL Fasting        Fasting blood tests today.     (Z12.5) Screening for prostate cancer  Comment:   Plan: PSA, screen             Patient has been advised of split billing requirements and indicates understanding: Yes  COUNSELING:   Reviewed preventive health counseling, as reflected in patient instructions  Special attention given to:        HIV screeninx in teen years, 1x in adult years, and at intervals if high risk       Colon cancer screening       Prostate cancer screening    Estimated body mass index is 41.33 kg/m  as calculated from the following:    Height as of this encounter: 1.842 m (6' 0.5\").    Weight as of this encounter: 140.2 kg (309 lb).     Weight management plan: Discussed " healthy diet and exercise guidelines    He reports that he quit smoking about 10 years ago. His smoking use included cigarettes. He has a 25.00 pack-year smoking history. He has never used smokeless tobacco.      Counseling Resources:  ATP IV Guidelines  Pooled Cohorts Equation Calculator  FRAX Risk Assessment  ICSI Preventive Guidelines  Dietary Guidelines for Americans, 2010  USDA's MyPlate  ASA Prophylaxis  Lung CA Screening    Bean Eduardo MD  [facility]    Answers for HPI/ROS submitted by the patient on 9/28/2020   Annual Exam:  If you checked off any problems, how difficult have these problems made it for you to do your work, take care of things at home, or get along with other people?: Not difficult at all  PHQ9 TOTAL SCORE: 1

## 2020-09-28 NOTE — PATIENT INSTRUCTIONS
Preventive Health Recommendations  Male Ages 50 - 64    Yearly exam:             See your health care provider every year in order to  o   Review health changes.   o   Discuss preventive care.    o   Review your medicines if your doctor has prescribed any.     Have a cholesterol test every 5 years, or more frequently if you are at risk for high cholesterol/heart disease.     Have a diabetes test (fasting glucose) every three years. If you are at risk for diabetes, you should have this test more often.     Have a colonoscopy at age 50, or have a yearly FIT test (stool test). These exams will check for colon cancer.      Talk with your health care provider about whether or not a prostate cancer screening test (PSA) is right for you.    You should be tested each year for STDs (sexually transmitted diseases), if you re at risk.     Shots: Get a flu shot each year. Get a tetanus shot every 10 years.     Nutrition:    Eat at least 5 servings of fruits and vegetables daily.     Eat whole-grain bread, whole-wheat pasta and brown rice instead of white grains and rice.     Get adequate Calcium and Vitamin D.     Lifestyle    Exercise for at least 150 minutes a week (30 minutes a day, 5 days a week). This will help you control your weight and prevent disease.     Limit alcohol to one drink per day.     No smoking.     Wear sunscreen to prevent skin cancer.     See your dentist every six months for an exam and cleaning.     See your eye doctor every 1 to 2 years.    ASSESSMENT/PLAN:                                                    Lifestyle recommendations:good job on losing weight!  keep working on losing weight (ideal BMI-body mass index is <25)  The following exams/tests were recommended and discussed for health maintenance:  FIT testing to check for hidden blood in the stool is a colon cancer screening test which should be done once yearly if normal.  If abnormal, a colonoscopy is recommended.    Prostate cancer  screening is optional starting at age 50 if normal risk, age 40 or 45 for high risk men (strong family history or blacks.)  Screening can include digital rectal exam and PSA blood test.     (Z00.00) Routine general medical examination at a health care facility  (primary encounter diagnosis)    (E11.9) Type 2 diabetes mellitus without complication, without long-term current use of insulin (H)  Comment: due for follow-up   Plan: C FOOT EXAM  NO CHARGE, Hemoglobin A1c,         liraglutide (VICTOZA PEN) 18 MG/3ML solution        If Hgb A1C is OK, recheck in 6 months.  If Hgb A1C is high ow I make changes, then follow-up in 3 months.     (Z12.11) Colon cancer screening  Comment:   Plan: Fecal colorectal cancer screen FIT        Complete FIT colon cancer screening test and send in the mail.      (N18.1) CKD (chronic kidney disease) stage 1, GFR 90 ml/min or greater  Comment: YOu have signs of early damage to kidneys from the diabetes mellitus.   Plan: lisinopril (ZESTRIL) 10 MG tablet        Good control of diabetes mellitus, blood pressure <140/90 and the ACE category drugs like lisinopril can all help prevent further kidney damage.   I recommend adding lisinopril 10mg daily to prevent further kidney damage.   Rechcek blood tests-./pot and creatinine in 2 weeks and blood pressure recheck to make sure blood pressure not too low and no side effects from taking medication.     (I82.431) Acute deep vein thrombosis (DVT) of popliteal vein of right lower extremity (H)  Comment:   Plan: No change in current treatment plan.     (N52.9) Erectile dysfunction, unspecified erectile dysfunction type  Comment:   Plan: sildenafil (REVATIO) 20 MG tablet        Refills as needed    (E66.01) Morbid obesity (H)    (E78.5) Hyperlipidemia LDL goal <100  Comment: due for recheck  Plan: Lipid panel reflex to direct LDL Fasting        Fasting blood tests today.     (Z12.5) Screening for prostate cancer  Comment:   Plan: PSA, screen

## 2020-09-29 ASSESSMENT — PATIENT HEALTH QUESTIONNAIRE - PHQ9: SUM OF ALL RESPONSES TO PHQ QUESTIONS 1-9: 1

## 2020-09-29 NOTE — RESULT ENCOUNTER NOTE
Alejo Mo,   Triglycerides, a type of fat found in the bloodstream is high.  Other lipids all good.   PSA test (for prostate cancer screening) is normal.   Hgb A1C is a little higher and just squeaking under the goal of <8.  PLAN:No new changes in treatment recommended.   Recheck in 3-6 months.   ROSIE ORTEGA MD

## 2020-09-30 DIAGNOSIS — Z12.11 COLON CANCER SCREENING: ICD-10-CM

## 2020-09-30 PROCEDURE — 82274 ASSAY TEST FOR BLOOD FECAL: CPT | Performed by: FAMILY MEDICINE

## 2020-10-03 LAB — HEMOCCULT STL QL IA: NEGATIVE

## 2020-10-04 NOTE — RESULT ENCOUNTER NOTE
Alejo Mo,   The colon cancer screening test (FIT test) is negative for sign of colon cancer.    PLAN: Repeat once every year.    ROSIE ORTEGA MD

## 2020-10-30 ENCOUNTER — MYC REFILL (OUTPATIENT)
Dept: FAMILY MEDICINE | Facility: CLINIC | Age: 55
End: 2020-10-30

## 2020-10-30 DIAGNOSIS — N18.1 CKD (CHRONIC KIDNEY DISEASE) STAGE 1, GFR 90 ML/MIN OR GREATER: ICD-10-CM

## 2020-10-30 RX ORDER — LISINOPRIL 10 MG/1
10 TABLET ORAL DAILY
Qty: 30 TABLET | Refills: 0 | Status: SHIPPED | OUTPATIENT
Start: 2020-10-30 | End: 2020-12-02

## 2020-12-02 ENCOUNTER — MYC REFILL (OUTPATIENT)
Dept: FAMILY MEDICINE | Facility: CLINIC | Age: 55
End: 2020-12-02

## 2020-12-02 DIAGNOSIS — N18.1 CKD (CHRONIC KIDNEY DISEASE) STAGE 1, GFR 90 ML/MIN OR GREATER: ICD-10-CM

## 2020-12-02 RX ORDER — LISINOPRIL 10 MG/1
10 TABLET ORAL DAILY
Qty: 90 TABLET | Refills: 1 | Status: SHIPPED | OUTPATIENT
Start: 2020-12-02 | End: 2021-04-02

## 2021-02-19 DIAGNOSIS — E78.5 HYPERLIPIDEMIA LDL GOAL <100: ICD-10-CM

## 2021-02-19 RX ORDER — ATORVASTATIN CALCIUM 20 MG/1
TABLET, FILM COATED ORAL
Qty: 30 TABLET | Refills: 6 | Status: SHIPPED | OUTPATIENT
Start: 2021-02-19 | End: 2021-09-23

## 2021-02-19 NOTE — TELEPHONE ENCOUNTER
"Prescription approved per [facility] Refill Protocol.    Requested Prescriptions   Pending Prescriptions Disp Refills     atorvastatin (LIPITOR) 20 MG tablet [Pharmacy Med Name: ATORVASTATIN CALCIUM 20MG TABS] 30 tablet 11     Sig: TAKE ONE TABLET BY MOUTH ONCE DAILY       Statins Protocol Passed - 2/19/2021  9:20 AM        Passed - LDL on file in past 12 months     Recent Labs   Lab Test 09/28/20  0958   LDL 51             Passed - No abnormal creatine kinase in past 12 months     No lab results found.             Passed - Recent (12 mo) or future (30 days) visit within the authorizing provider's specialty     Patient has had an office visit with the authorizing provider or a provider within the authorizing providers department within the previous 12 mos or has a future within next 30 days. See \"Patient Info\" tab in inbasket, or \"Choose Columns\" in Meds & Orders section of the refill encounter.              Passed - Medication is active on med list        Passed - Patient is age 18 or older           Sophia TAMAYO RN, BSN      "

## 2021-03-19 ENCOUNTER — IMMUNIZATION (OUTPATIENT)
Dept: FAMILY MEDICINE | Facility: CLINIC | Age: 56
End: 2021-03-19
Payer: COMMERCIAL

## 2021-03-19 PROCEDURE — 0011A PR COVID VAC MODERNA 100 MCG/0.5 ML IM: CPT

## 2021-03-19 PROCEDURE — 91301 PR COVID VAC MODERNA 100 MCG/0.5 ML IM: CPT

## 2021-04-02 ENCOUNTER — OFFICE VISIT (OUTPATIENT)
Dept: FAMILY MEDICINE | Facility: CLINIC | Age: 56
End: 2021-04-02
Payer: COMMERCIAL

## 2021-04-02 VITALS
DIASTOLIC BLOOD PRESSURE: 80 MMHG | RESPIRATION RATE: 14 BRPM | BODY MASS INDEX: 41.75 KG/M2 | OXYGEN SATURATION: 97 % | HEART RATE: 81 BPM | TEMPERATURE: 97.3 F | WEIGHT: 315 LBS | HEIGHT: 73 IN | SYSTOLIC BLOOD PRESSURE: 130 MMHG

## 2021-04-02 DIAGNOSIS — I82.431 ACUTE DEEP VEIN THROMBOSIS (DVT) OF POPLITEAL VEIN OF RIGHT LOWER EXTREMITY (H): ICD-10-CM

## 2021-04-02 DIAGNOSIS — Z11.59 NEED FOR HEPATITIS C SCREENING TEST: ICD-10-CM

## 2021-04-02 DIAGNOSIS — N18.1 CKD (CHRONIC KIDNEY DISEASE) STAGE 1, GFR 90 ML/MIN OR GREATER: ICD-10-CM

## 2021-04-02 DIAGNOSIS — E11.9 TYPE 2 DIABETES MELLITUS WITHOUT COMPLICATION, WITHOUT LONG-TERM CURRENT USE OF INSULIN (H): Primary | ICD-10-CM

## 2021-04-02 LAB
ANION GAP SERPL CALCULATED.3IONS-SCNC: 3 MMOL/L (ref 3–14)
BUN SERPL-MCNC: 20 MG/DL (ref 7–30)
CALCIUM SERPL-MCNC: 9.2 MG/DL (ref 8.5–10.1)
CHLORIDE SERPL-SCNC: 103 MMOL/L (ref 94–109)
CO2 SERPL-SCNC: 29 MMOL/L (ref 20–32)
CREAT SERPL-MCNC: 0.76 MG/DL (ref 0.66–1.25)
CREAT UR-MCNC: 58 MG/DL
GFR SERPL CREATININE-BSD FRML MDRD: >90 ML/MIN/{1.73_M2}
GLUCOSE SERPL-MCNC: 138 MG/DL (ref 70–99)
HBA1C MFR BLD: 9 % (ref 0–5.6)
HCV AB SERPL QL IA: NONREACTIVE
MICROALBUMIN UR-MCNC: 11 MG/L
MICROALBUMIN/CREAT UR: 18.73 MG/G CR (ref 0–17)
POTASSIUM SERPL-SCNC: 4.8 MMOL/L (ref 3.4–5.3)
SODIUM SERPL-SCNC: 135 MMOL/L (ref 133–144)

## 2021-04-02 PROCEDURE — 82043 UR ALBUMIN QUANTITATIVE: CPT | Performed by: FAMILY MEDICINE

## 2021-04-02 PROCEDURE — 80048 BASIC METABOLIC PNL TOTAL CA: CPT | Performed by: FAMILY MEDICINE

## 2021-04-02 PROCEDURE — 86803 HEPATITIS C AB TEST: CPT | Performed by: FAMILY MEDICINE

## 2021-04-02 PROCEDURE — 83036 HEMOGLOBIN GLYCOSYLATED A1C: CPT | Performed by: FAMILY MEDICINE

## 2021-04-02 PROCEDURE — 99214 OFFICE O/P EST MOD 30 MIN: CPT | Performed by: FAMILY MEDICINE

## 2021-04-02 PROCEDURE — 36415 COLL VENOUS BLD VENIPUNCTURE: CPT | Performed by: FAMILY MEDICINE

## 2021-04-02 RX ORDER — LISINOPRIL 10 MG/1
10 TABLET ORAL DAILY
Qty: 30 TABLET | Refills: 11 | Status: SHIPPED | OUTPATIENT
Start: 2021-04-02 | End: 2022-04-21

## 2021-04-02 RX ORDER — PEN NEEDLE, DIABETIC 32GX 5/32"
NEEDLE, DISPOSABLE MISCELLANEOUS
Qty: 100 EACH | Refills: 3 | Status: SHIPPED | OUTPATIENT
Start: 2021-04-02 | End: 2022-04-07

## 2021-04-02 ASSESSMENT — MIFFLIN-ST. JEOR: SCORE: 2324.31

## 2021-04-02 NOTE — PROGRESS NOTES
ASSESSMENT:   (E11.9) Type 2 diabetes mellitus without complication, without long-term current use of insulin (H)  (primary encounter diagnosis)  Comment: Hgb A1C has increased.  Above goal of <8  Plan: Hemoglobin A1c, Albumin Random Urine         Quantitative with Creat Ratio, blood glucose         (NO BRAND SPECIFIED) lancets standard, blood         glucose (NO BRAND SPECIFIED) test strip,         metFORMIN (GLUCOPHAGE) 1000 MG tablet, insulin         pen needle (BD JAVI U/F) 32G X 4 MM         miscellaneous          You think you can improve diabetes mellitus with lifestyle changes.    No medication changes at this time.   REcheck in 3 months.   Eye exam needed yearly to check for changes/damage from diabetes mellitus.     (N18.1) CKD (chronic kidney disease) stage 1, GFR 90 ml/min or greater  Comment:   Plan: Albumin Random Urine Quantitative with Creat         Ratio, Basic metabolic panel  (Ca, Cl, CO2,         Creat, Gluc, K, Na, BUN), lisinopril (ZESTRIL)         10 MG tablet        Continue the lisinopril   Good control of diabetes mellitus, blood pressure <140/90 and the ACE category drugs like lisinopril can all help prevent further kidney damage.     (Z11.59) Need for hepatitis C screening test  Comment:   Plan: Hepatitis C Screen Reflex to HCV RNA Quant and         Genotype          (I82.431) Acute deep vein thrombosis (DVT) of popliteal vein of right lower extremity (H)  Comment:   Plan: rivaroxaban ANTICOAGULANT (XARELTO         ANTICOAGULANT) 20 MG TABS tablet        REfills.            Subjective   Chepe is a 55 year old who presents for the following health issues   Chief Complaint   Patient presents with     Diabetes      He did start the lisinopril 10mg at last visit.      Diabetes Follow-up    How often are you checking your blood sugar? One time daily-Checks at varying times.  Glucometer range within the past month=118-180.  Often 120s  Diabetes medications currently taking: empagliflozin  (Jardiance) 25mg daily, liraglutide 1.8mg daily.  metformin 1000mg twice daily   Lab Results   Component Value Date    A1C 9.0 04/02/2021     Lifestyle changes with pandemic.  Easier to snack at home.    What time of day are you checking your blood sugars (select all that apply)?  Before and after meals  Have you had any blood sugars above 200?  No  Have you had any blood sugars below 70?  No    What symptoms do you notice when your blood sugar is low?  None    What concerns do you have today about your diabetes? None     Do you have any of these symptoms? (Select all that apply)  No numbness or tingling in feet.  No redness, sores or blisters on feet.  No complaints of excessive thirst.  No reports of blurry vision.  No significant changes to weight.    Have you had a diabetic eye exam in the last 12 months? No-knows he has to schedule          Hyperlipidemia Follow-Up      Are you regularly taking any medication or supplement to lower your cholesterol?   Yes- atorvastatin    Are you having muscle aches or other side effects that you think could be caused by your cholesterol lowering medication?  No    Hypertension Follow-up      Do you check your blood pressure regularly outside of the clinic? No     Are you following a low salt diet? No    Are your blood pressures ever more than 140 on the top number (systolic) OR more   than 90 on the bottom number (diastolic), for example 140/90? Unknown due to not checking    BP Readings from Last 2 Encounters:   09/28/20 120/80   03/09/20 130/84     Hemoglobin A1C (%)   Date Value   09/28/2020 7.9 (H)   03/09/2020 7.4 (H)     LDL Cholesterol Calculated (mg/dL)   Date Value   09/28/2020 51   08/19/2019 45         How many servings of fruits and vegetables do you eat daily?  2-3    On average, how many sweetened beverages do you drink each day (Examples: soda, juice, sweet tea, etc.  Do NOT count diet or artificially sweetened beverages)?   0    How many days per week do you  exercise enough to make your heart beat faster? 3 or less    How many minutes a day do you exercise enough to make your heart beat faster? 10 - 19    How many days per week do you miss taking your medication? 0    Last clinic visit:9/28/2020  Medication or other changes at last visit:added lisinopril   Weight since last visit? Up 7#  Wt Readings from Last 5 Encounters:   04/02/21 143.3 kg (316 lb)   09/28/20 140.2 kg (309 lb)   03/09/20 142.8 kg (314 lb 12.8 oz)   11/19/19 136.5 kg (301 lb)   09/20/19 140.6 kg (310 lb)      History   Smoking Status     Former Smoker     Packs/day: 1.00     Years: 25.00     Types: Cigarettes     Quit date: 2/26/2010   Smokeless Tobacco     Never Used     Comment: started at age 19       Past medical history reviewed and updated    Patient Active Problem List    Diagnosis Date Noted     CKD (chronic kidney disease) stage 1, GFR 90 ml/min or greater 09/28/2020     Priority: Medium     Hyperlipidemia LDL goal <100 02/22/2019     Priority: Medium     Acute deep vein thrombosis (DVT) of popliteal vein of right lower extremity (H) 03/28/2018     Priority: Medium     3/28/2018:Unprovoked.  PLAN: indefinite anticoagulation.        Osteoarthritis of both knees, unspecified osteoarthritis type 03/28/2018     Priority: Medium     Type 2 diabetes mellitus without complication, without long-term current use of insulin (H) 03/19/2018     Priority: Medium     Morbid obesity (H) 03/19/2018     Priority: Medium     Current Outpatient Medications   Medication Sig Dispense Refill     atorvastatin (LIPITOR) 20 MG tablet TAKE ONE TABLET BY MOUTH ONCE DAILY 30 tablet 6     BD JAVI U/F 32G X 4 MM insulin pen needle USE DAILY WITH VICTOZA 100 each 4     blood glucose (NO BRAND SPECIFIED) lancets standard Use to test blood sugar 2-3 times daily or as directed. 100 each 3     blood glucose monitoring (NO BRAND SPECIFIED) meter device kit Use to test blood sugar 2-3 times daily or as directed. 1 kit 0      "blood glucose monitoring (NO BRAND SPECIFIED) test strip Use to test blood sugar 2-3 times daily or as directed. 100 strip 11     empagliflozin (JARDIANCE) 25 MG TABS tablet Take 1 tablet (25 mg) by mouth daily 30 tablet 11     liraglutide (VICTOZA PEN) 18 MG/3ML solution TAKE 1.8 MG DAILY IN THE MORNING 3 mL 11     lisinopril (ZESTRIL) 10 MG tablet Take 1 tablet (10 mg) by mouth daily 90 tablet 1     metFORMIN (GLUCOPHAGE) 1000 MG tablet TAKE ONE TABLET BY MOUTH TWICE A DAY WITH MEALS 60 tablet 0     Multiple Vitamin (MULTI VITAMIN MENS PO) Take 1 tablet by mouth daily       sildenafil (REVATIO) 20 MG tablet Sidenafil (Viagra) comes in 20mg strength pills. Take as many pills as needed up to maximum of 5 pills at a time prior to intercourse. 50 tablet 11     XARELTO ANTICOAGULANT 20 MG TABS tablet TAKE ONE TABLET BY MOUTH ONCE DAILY WITH DINNER 30 tablet 0     ASPIRIN NOT PRESCRIBED (INTENTIONAL) Please choose reason not prescribed, below       ROS:General: No change in weight, sleep or appetite.  Normal energy.  No fever or chills  Resp: No coughing, wheezing or shortness of breath  CV: No chest pains or palpitations  GI: No nausea, vomiting,  heartburn, abdominal pain, diarrhea, constipation or change in bowel habits  : No urinary frequency or dysuria, bladder or kidney problems  Musculoskeletal: POSITIVE  for:, pain in legs.   Varicose veins in his family. Legs achy all the time.  Compression stockings help.    Psychiatric: No problems with anxiety, depression or mental health    OBJECTIVE:Blood pressure 130/80, pulse 81, temperature 97.3  F (36.3  C), temperature source Tympanic, resp. rate 14, height 1.842 m (6' 0.5\"), weight 143.3 kg (316 lb), SpO2 97 %. BMI=Body mass index is 42.27 kg/m .  GENERAL APPEARANCE ADULT: Alert, no acute distress, morbidly obese   Foot exam:      "

## 2021-04-02 NOTE — NURSING NOTE
"Chief Complaint   Patient presents with     Diabetes       Initial /80 (BP Location: Right arm, Patient Position: Chair, Cuff Size: Adult Large)   Pulse 81   Temp 97.3  F (36.3  C) (Tympanic)   Resp 14   Ht 1.842 m (6' 0.5\")   Wt 143.3 kg (316 lb)   SpO2 97%   BMI 42.27 kg/m   Estimated body mass index is 42.27 kg/m  as calculated from the following:    Height as of this encounter: 1.842 m (6' 0.5\").    Weight as of this encounter: 143.3 kg (316 lb).    Patient presents to the clinic using No DME    Health Maintenance that is potentially due pending provider review:  NONE        Is there anyone who you would like to be able to receive your results? No  If yes have patient fill out SINDY      "

## 2021-04-02 NOTE — PATIENT INSTRUCTIONS
ASSESSMENT:   (E11.9) Type 2 diabetes mellitus without complication, without long-term current use of insulin (H)  (primary encounter diagnosis)  Comment: Hgb A1C has increased.  Above goal of <8  Plan: Hemoglobin A1c, Albumin Random Urine         Quantitative with Creat Ratio, blood glucose         (NO BRAND SPECIFIED) lancets standard, blood         glucose (NO BRAND SPECIFIED) test strip,         metFORMIN (GLUCOPHAGE) 1000 MG tablet, insulin         pen needle (BD JAVI U/F) 32G X 4 MM         miscellaneous          You think you can improve diabetes mellitus with lifestyle changes.    No medication changes at this time.   REcheck in 3 months.   Eye exam needed yearly to check for changes/damage from diabetes mellitus.     (N18.1) CKD (chronic kidney disease) stage 1, GFR 90 ml/min or greater  Comment:   Plan: Albumin Random Urine Quantitative with Creat         Ratio, Basic metabolic panel  (Ca, Cl, CO2,         Creat, Gluc, K, Na, BUN), lisinopril (ZESTRIL)         10 MG tablet        Continue the lisinopril   Good control of diabetes mellitus, blood pressure <140/90 and the ACE category drugs like lisinopril can all help prevent further kidney damage.     (Z11.59) Need for hepatitis C screening test  Comment:   Plan: Hepatitis C Screen Reflex to HCV RNA Quant and         Genotype          (I82.431) Acute deep vein thrombosis (DVT) of popliteal vein of right lower extremity (H)  Comment:   Plan: rivaroxaban ANTICOAGULANT (XARELTO         ANTICOAGULANT) 20 MG TABS tablet        REfills.

## 2021-04-05 NOTE — RESULT ENCOUNTER NOTE
Alejo Mo,   Urine tests show positive MAC (microalbumin/creatinine ratio) meaning protein leaking from the kidneys into the urine. This is a sign of some kidney damage from the diabetes mellitus.  This has been present in the past and consistent with chronic kidney disease Stage 1.  Hepatitis C test is negative.    The blood chemistries (Basic metabolic panel) are all normal including electrolytes (salt balances in the blood) and kidney tests with the exception of glucose which is high.     HgbA1C is a test for diabetes mellitus that gives us an estimate of blood sugar over the past three months.   The HbgA1C is high indicating that the diabetes is not well controlled. We discussed improving with lifestyle changes.   PLAN: recheck in 3 months.   ROSIE ORTEGA MD

## 2021-04-16 ENCOUNTER — IMMUNIZATION (OUTPATIENT)
Dept: FAMILY MEDICINE | Facility: CLINIC | Age: 56
End: 2021-04-16
Attending: FAMILY MEDICINE
Payer: COMMERCIAL

## 2021-04-16 PROCEDURE — 91301 PR COVID VAC MODERNA 100 MCG/0.5 ML IM: CPT

## 2021-04-16 PROCEDURE — 0012A PR COVID VAC MODERNA 100 MCG/0.5 ML IM: CPT

## 2021-07-21 DIAGNOSIS — E11.9 TYPE 2 DIABETES MELLITUS WITHOUT COMPLICATION, WITHOUT LONG-TERM CURRENT USE OF INSULIN (H): ICD-10-CM

## 2021-07-22 RX ORDER — EMPAGLIFLOZIN 25 MG/1
TABLET, FILM COATED ORAL
Qty: 30 TABLET | Refills: 1 | Status: SHIPPED | OUTPATIENT
Start: 2021-07-22 | End: 2021-09-23

## 2021-09-21 DIAGNOSIS — E78.5 HYPERLIPIDEMIA LDL GOAL <100: ICD-10-CM

## 2021-09-21 DIAGNOSIS — E11.9 TYPE 2 DIABETES MELLITUS WITHOUT COMPLICATION, WITHOUT LONG-TERM CURRENT USE OF INSULIN (H): ICD-10-CM

## 2021-09-23 RX ORDER — EMPAGLIFLOZIN 25 MG/1
TABLET, FILM COATED ORAL
Qty: 30 TABLET | Refills: 0 | Status: SHIPPED | OUTPATIENT
Start: 2021-09-23 | End: 2021-10-27

## 2021-09-23 RX ORDER — ATORVASTATIN CALCIUM 20 MG/1
TABLET, FILM COATED ORAL
Qty: 30 TABLET | Refills: 6 | Status: SHIPPED | OUTPATIENT
Start: 2021-09-23 | End: 2022-04-21

## 2021-09-26 ENCOUNTER — HEALTH MAINTENANCE LETTER (OUTPATIENT)
Age: 56
End: 2021-09-26

## 2021-10-24 DIAGNOSIS — E11.9 TYPE 2 DIABETES MELLITUS WITHOUT COMPLICATION, WITHOUT LONG-TERM CURRENT USE OF INSULIN (H): ICD-10-CM

## 2021-11-08 ENCOUNTER — OFFICE VISIT (OUTPATIENT)
Dept: FAMILY MEDICINE | Facility: CLINIC | Age: 56
End: 2021-11-08
Payer: COMMERCIAL

## 2021-11-08 VITALS
HEIGHT: 72 IN | RESPIRATION RATE: 18 BRPM | WEIGHT: 305 LBS | BODY MASS INDEX: 41.31 KG/M2 | SYSTOLIC BLOOD PRESSURE: 120 MMHG | HEART RATE: 80 BPM | TEMPERATURE: 97.1 F | DIASTOLIC BLOOD PRESSURE: 70 MMHG

## 2021-11-08 DIAGNOSIS — Z12.11 SPECIAL SCREENING FOR MALIGNANT NEOPLASMS, COLON: ICD-10-CM

## 2021-11-08 DIAGNOSIS — E11.9 TYPE 2 DIABETES MELLITUS WITHOUT COMPLICATION, WITHOUT LONG-TERM CURRENT USE OF INSULIN (H): Primary | ICD-10-CM

## 2021-11-08 DIAGNOSIS — E78.5 HYPERLIPIDEMIA LDL GOAL <100: ICD-10-CM

## 2021-11-08 LAB
CHOLEST SERPL-MCNC: 123 MG/DL
HBA1C MFR BLD: 8.1 % (ref 0–5.6)
HDLC SERPL-MCNC: 38 MG/DL
HOLD SPECIMEN: NORMAL
HOLD SPECIMEN: NORMAL
LDLC SERPL CALC-MCNC: 37 MG/DL
NONHDLC SERPL-MCNC: 85 MG/DL
TRIGL SERPL-MCNC: 242 MG/DL

## 2021-11-08 PROCEDURE — 80061 LIPID PANEL: CPT | Performed by: FAMILY MEDICINE

## 2021-11-08 PROCEDURE — 99214 OFFICE O/P EST MOD 30 MIN: CPT | Mod: 25 | Performed by: FAMILY MEDICINE

## 2021-11-08 PROCEDURE — 36415 COLL VENOUS BLD VENIPUNCTURE: CPT | Performed by: FAMILY MEDICINE

## 2021-11-08 PROCEDURE — 90682 RIV4 VACC RECOMBINANT DNA IM: CPT | Performed by: FAMILY MEDICINE

## 2021-11-08 PROCEDURE — 90471 IMMUNIZATION ADMIN: CPT | Performed by: FAMILY MEDICINE

## 2021-11-08 PROCEDURE — 83036 HEMOGLOBIN GLYCOSYLATED A1C: CPT | Performed by: FAMILY MEDICINE

## 2021-11-08 RX ORDER — LIRAGLUTIDE 6 MG/ML
INJECTION SUBCUTANEOUS
Qty: 27 ML | Refills: 3 | Status: SHIPPED | OUTPATIENT
Start: 2021-11-08 | End: 2022-06-16

## 2021-11-08 ASSESSMENT — MIFFLIN-ST. JEOR: SCORE: 2262.26

## 2021-11-08 NOTE — NURSING NOTE
"Chief Complaint   Patient presents with     Diabetes       Initial /70   Pulse 80   Temp 97.1  F (36.2  C) (Tympanic)   Resp 18   Ht 1.83 m (6' 0.05\")   Wt 138.3 kg (305 lb)   BMI 41.31 kg/m   Estimated body mass index is 41.31 kg/m  as calculated from the following:    Height as of this encounter: 1.83 m (6' 0.05\").    Weight as of this encounter: 138.3 kg (305 lb).    Patient presents to the clinic using     Health Maintenance that is potentially due pending provider review:          Is there anyone who you would like to be able to receive your results?   If yes have patient fill out SINDY    "

## 2021-11-08 NOTE — PROGRESS NOTES
ASSESSMENT/PLAN:                                                    Lifestyle recommendations:regular exercise, at least 150 minutes per week (average 30 minutes 5 times a week)  good job on losing weight!  keep working on losing weight (ideal BMI-body mass index is <25)  Diabetic recommendations:-eye exam is needed yearly  make appointment for eye exam  return for follow-up visit in 3 month(s)-you could do blood test only in 3 months.      (E11.9) Type 2 diabetes mellitus without complication, without long-term current use of insulin (H)  (primary encounter diagnosis)  Comment: improved and Hgb A1C is just above goal.  ON good doses of three medications.    Plan: Hemoglobin A1c, empagliflozin (JARDIANCE) 25 MG        TABS tablet, liraglutide (VICTOZA PEN) 18         MG/3ML solution        No change in current treatment plan.   Hgb A1C in three months blood test for diabetes mellitus.     (Z12.11) Special screening for malignant neoplasms, colon  Comment:   Plan: Fecal colorectal cancer screen (FIT)        Complete FIT colon cancer screening test and send in the mail.      (E78.5) Hyperlipidemia LDL goal <100  Comment:   Plan: Lipid panel reflex to direct LDL Fasting        Non-fasting blood tests today.      Diabetes complications/Comorbid conditions:  Nephropathy:ckd    Retinopathy: no  Neuropathy: no  Vascular disease: no  High/low problems: no  Tobacco use: no    Treatments:  Statin: atorvastatin   Exercise: walks daily a few blocks.  Limited by knee pain.   ACE: lisinopril   ASA: no rivaroxaban (Xarelto)       Subjective   Chepe is a 56 year old who presents for the following health issues  Chief Complaint   Patient presents with     Diabetes      Health doing OK.     Diabetes Follow-up    How often are you checking your blood sugar? Two times daily.  AM, before dinner and after dinner.   Glucometer range within the past month=  Usually around 120.   He has been working on diet-smaller portions.   Sometimes skips lunch and does snack instead.    Diabetes medications currently taking: metformin 1000mg twice daily, .lilra 1.8mg daily and empagliflozin (Jardiance) 25mg daily.      Lab Results   Component Value Date    A1C 8.1 11/08/2021    A1C 9.0 04/02/2021        Blood sugar testing frequency justification:  Uncontrolled diabetes  What time of day are you checking your blood sugars (select all that apply)?  Before and after meals  Have you had any blood sugars above 200?  No  Have you had any blood sugars below 70?  No    What symptoms do you notice when your blood sugar is low?  None    What concerns do you have today about your diabetes? None     Do you have any of these symptoms? (Select all that apply)  No numbness or tingling in feet.  No redness, sores or blisters on feet.  No complaints of excessive thirst.  No reports of blurry vision.  No significant changes to weight.    Have you had a diabetic eye exam in the last 12 months? No      Hyperlipidemia Follow-Up      Are you regularly taking any medication or supplement to lower your cholesterol?   Yes- atorvastatin    Are you having muscle aches or other side effects that you think could be caused by your cholesterol lowering medication?  No    Hypertension Follow-up      Do you check your blood pressure regularly outside of the clinic? No     Are you following a low salt diet? Yes    Are your blood pressures ever more than 140 on the top number (systolic) OR more   than 90 on the bottom number (diastolic), for example 140/90? No    BP Readings from Last 2 Encounters:   11/08/21 120/70   04/02/21 130/80     Hemoglobin A1C (%)   Date Value   04/02/2021 9.0 (H)   09/28/2020 7.9 (H)     LDL Cholesterol Calculated (mg/dL)   Date Value   09/28/2020 51   08/19/2019 45       How many days per week do you miss taking your medication? 0    Last clinic visit:4/2/21  Medication or other changes at last visit:Hgb A1C high but he felt he could alter lifestyle to  control.   Weight since last visit? Down 11#  Wt Readings from Last 5 Encounters:   11/08/21 138.3 kg (305 lb)   04/02/21 143.3 kg (316 lb)   09/28/20 140.2 kg (309 lb)   03/09/20 142.8 kg (314 lb 12.8 oz)   11/19/19 136.5 kg (301 lb)      History   Smoking Status     Former Smoker     Packs/day: 1.00     Years: 25.00     Types: Cigarettes     Quit date: 2/26/2010   Smokeless Tobacco     Never Used     Comment: started at age 19       Past medical history reviewed and updated    Patient Active Problem List    Diagnosis Date Noted     CKD (chronic kidney disease) stage 1, GFR 90 ml/min or greater 09/28/2020     Priority: Medium     Hyperlipidemia LDL goal <100 02/22/2019     Priority: Medium     Acute deep vein thrombosis (DVT) of popliteal vein of right lower extremity (H) 03/28/2018     Priority: Medium     3/28/2018:Unprovoked.  PLAN: indefinite anticoagulation.        Osteoarthritis of both knees, unspecified osteoarthritis type 03/28/2018     Priority: Medium     Type 2 diabetes mellitus without complication, without long-term current use of insulin (H) 03/19/2018     Priority: Medium     Morbid obesity (H) 03/19/2018     Priority: Medium     Current Outpatient Medications   Medication Sig Dispense Refill     atorvastatin (LIPITOR) 20 MG tablet TAKE ONE TABLET BY MOUTH ONCE DAILY 30 tablet 6     empagliflozin (JARDIANCE) 25 MG TABS tablet Take 1 tablet (25 mg) by mouth daily 30 tablet 0     liraglutide (VICTOZA PEN) 18 MG/3ML solution TAKE 1.8 MG UNDER THE SKIN DAILY IN THE MORNING 9 mL 0     lisinopril (ZESTRIL) 10 MG tablet Take 1 tablet (10 mg) by mouth daily 30 tablet 11     metFORMIN (GLUCOPHAGE) 1000 MG tablet Take 1 tablet (1,000 mg) by mouth 2 times daily (with meals) 60 tablet 11     Multiple Vitamin (MULTI VITAMIN MENS PO) Take 1 tablet by mouth daily       rivaroxaban ANTICOAGULANT (XARELTO ANTICOAGULANT) 20 MG TABS tablet TAKE ONE TABLET BY MOUTH ONCE DAILY WITH DINNER 30 tablet 11     sildenafil  "(REVATIO) 20 MG tablet Sidenafil (Viagra) comes in 20mg strength pills. Take as many pills as needed up to maximum of 5 pills at a time prior to intercourse. 50 tablet 11     ASPIRIN NOT PRESCRIBED (INTENTIONAL) Please choose reason not prescribed, below       blood glucose (NO BRAND SPECIFIED) lancets standard Use to test blood sugar 1 time daily. 100 each 3     blood glucose (NO BRAND SPECIFIED) test strip Use to test blood sugar 1 time daily. 100 strip 3     blood glucose monitoring (NO BRAND SPECIFIED) meter device kit Use to test blood sugar 2-3 times daily or as directed. 1 kit 0     insulin pen needle (BD JAVI U/F) 32G X 4 MM miscellaneous USE DAILY WITH VICTOZA 100 each 3     ROS:General: No change in weight, sleep or appetite.  Normal energy.  No fever or chills  Resp: No coughing, wheezing or shortness of breath  CV: No chest pains or palpitations  GI: No nausea, vomiting,  heartburn, abdominal pain, diarrhea, constipation or change in bowel habits  : No urinary frequency or dysuria, bladder or kidney problems  Musculoskeletal: POSITIVE  for:, pain knees and hands   Psychiatric: No problems with anxiety, depression or mental health     OBJECTIVE:Blood pressure 120/70, pulse 80, temperature 97.1  F (36.2  C), temperature source Tympanic, resp. rate 18, height 1.83 m (6' 0.05\"), weight 138.3 kg (305 lb). BMI=Body mass index is 41.31 kg/m .  GENERAL APPEARANCE ADULT: Alert, no acute distress, morbidly obese  NECK: No adenopathy,masses or thyromegaly  RESP: lungs clear to auscultation   CV: normal rate, regular rhythm, no murmur or gallop  ABDOMEN: soft, no organomegaly, masses or tenderness  MS: extremities normal, no peripheral edema   Foot exam:declined today.  Wearing compression stockings.         "

## 2021-11-08 NOTE — PATIENT INSTRUCTIONS
ASSESSMENT/PLAN:                                                    Lifestyle recommendations:regular exercise, at least 150 minutes per week (average 30 minutes 5 times a week)  good job on losing weight!  keep working on losing weight (ideal BMI-body mass index is <25)  Diabetic recommendations:-eye exam is needed yearly  make appointment for eye exam  return for follow-up visit in 3 month(s)-you could do blood test only in 3 months.      (E11.9) Type 2 diabetes mellitus without complication, without long-term current use of insulin (H)  (primary encounter diagnosis)  Comment: improved and Hgb A1C is just above goal.  ON good doses of three medications.    Plan: Hemoglobin A1c, empagliflozin (JARDIANCE) 25 MG        TABS tablet, liraglutide (VICTOZA PEN) 18         MG/3ML solution        No change in current treatment plan.   Hgb A1C in three months blood test for diabetes mellitus.     (Z12.11) Special screening for malignant neoplasms, colon  Comment:   Plan: Fecal colorectal cancer screen (FIT)        Complete FIT colon cancer screening test and send in the mail.      (E78.5) Hyperlipidemia LDL goal <100  Comment:   Plan: Lipid panel reflex to direct LDL Fasting        Non-fasting blood tests today.      Diabetes complications/Comorbid conditions:  Nephropathy:ckd    Retinopathy: no  Neuropathy: no  Vascular disease: no  High/low problems: no  Tobacco use: no    Treatments:  Statin: atorvastatin   Exercise: walks daily a few blocks.  Limited by knee pain.   ACE: lisinopril   ASA: no rivaroxaban (Xarelto)

## 2021-11-09 NOTE — RESULT ENCOUNTER NOTE
"Alejo Mo,   Triglycerides, a type of fat found in the bloodstream is high.  HDL (\"good cholesterol\") is low.  .  The LDL \"bad cholesterol\" is at goal level.   Your Hgb A1C test for diabetes mellitus has improved.  Still slightly above goal as we discussed.    PLAN: No new changes in treatment recommended.   follow-up Hgb A1C  in 3 months.   ROSIE ORTEGA MD "

## 2021-11-10 ENCOUNTER — LAB (OUTPATIENT)
Dept: LAB | Facility: CLINIC | Age: 56
End: 2021-11-10
Payer: COMMERCIAL

## 2021-11-10 DIAGNOSIS — Z12.11 SPECIAL SCREENING FOR MALIGNANT NEOPLASMS, COLON: ICD-10-CM

## 2021-11-10 LAB — HEMOCCULT STL QL IA: NEGATIVE

## 2021-11-10 PROCEDURE — 82274 ASSAY TEST FOR BLOOD FECAL: CPT

## 2021-11-10 NOTE — RESULT ENCOUNTER NOTE
FELICIA Mo,   The colon cancer screening test (FIT test) is negative for sign of colon cancer.   PLAN: Repeat once every year.    ROSIE ORTEGA MD

## 2021-11-21 ENCOUNTER — HEALTH MAINTENANCE LETTER (OUTPATIENT)
Age: 56
End: 2021-11-21

## 2022-02-04 ENCOUNTER — LAB (OUTPATIENT)
Dept: LAB | Facility: CLINIC | Age: 57
End: 2022-02-04
Attending: FAMILY MEDICINE
Payer: COMMERCIAL

## 2022-02-04 DIAGNOSIS — Z20.822 ENCOUNTER FOR LABORATORY TESTING FOR COVID-19 VIRUS: ICD-10-CM

## 2022-02-04 PROCEDURE — U0005 INFEC AGEN DETEC AMPLI PROBE: HCPCS

## 2022-02-04 PROCEDURE — U0003 INFECTIOUS AGENT DETECTION BY NUCLEIC ACID (DNA OR RNA); SEVERE ACUTE RESPIRATORY SYNDROME CORONAVIRUS 2 (SARS-COV-2) (CORONAVIRUS DISEASE [COVID-19]), AMPLIFIED PROBE TECHNIQUE, MAKING USE OF HIGH THROUGHPUT TECHNOLOGIES AS DESCRIBED BY CMS-2020-01-R: HCPCS

## 2022-02-05 LAB — SARS-COV-2 RNA RESP QL NAA+PROBE: POSITIVE

## 2022-02-06 ENCOUNTER — NURSE TRIAGE (OUTPATIENT)
Dept: NURSING | Facility: CLINIC | Age: 57
End: 2022-02-06
Payer: COMMERCIAL

## 2022-02-06 ENCOUNTER — E-VISIT (OUTPATIENT)
Dept: URGENT CARE | Facility: CLINIC | Age: 57
End: 2022-02-06
Payer: COMMERCIAL

## 2022-02-06 DIAGNOSIS — U07.1 INFECTION DUE TO 2019 NOVEL CORONAVIRUS: Primary | ICD-10-CM

## 2022-02-06 PROCEDURE — 99207 PR NO CHARGE LOS: CPT | Performed by: FAMILY MEDICINE

## 2022-02-06 NOTE — TELEPHONE ENCOUNTER
Pt travelling today and tested positive for COVID.    He states he tested positive on 1/5/22 using an at home test, as he had COVID symptoms last month. FNA advised that he can continue to test positive for 3 months after initial infection, but can be out in public as soon as he meets criteria for ending home isolation.    FNA offered an e-visit as an option to get a note from provider. This e-visit only works if a patient is in [place].    Pt verbalized understanding.    Jess Grey RN/[place] Nurse Advisor

## 2022-02-06 NOTE — TELEPHONE ENCOUNTER
"Coronavirus (COVID-19) Notification    Caller Name (Patient, parent, daughter/son, grandparent, etc)  Chepe Severo    Reason for call  Notify of Positive Coronavirus (COVID-19) lab results, assess symptoms,  review  [place]  [facility] recommendations    Lab Result    Lab test:  2019-nCoV rRt-PCR or SARS-CoV-2 PCR    Oropharyngeal AND/OR nasopharyngeal swabs is POSITIVE for 2019-nCoV RNA/SARS-COV-2 PCR (COVID-19 virus)    RN Recommendations/Instructions per [facility] Coronavirus COVID-19 recommendations    Brief introduction script  Introduce self then review script:  \"I am calling on behalf of [facility].  We were notified that your Coronavirus test (COVID-19) for was POSITIVE for the virus.  I have some information to relay to you but first I wanted to mention that the [place] Dept of Health will be contacting you shortly [it's possible MD already called Patient] to talk to you more about how you are feeling and other people you have had contact with who might now also have the virus.  Also,  [place]  [place] is Partnering with the [facility] for Covid-19 research, you may be contacted directly by research staff.\"    Assessment (Inquire about Patient's current symptoms)   Assessment   Current Symptoms at time of phone call: (if no symptoms, document No symptoms] Sick in January, symptoms not specified during the call   Symptoms onset (if applicable) 1/5/22     BELOW INFORMATION NOT DISCUSSED WITH PATIENT    If at time of call, Patients symptoms hare worsened, the Patient should contact 911 or have someone drive them to Emergency Dept promptly:      If Patient calling 911, inform 911 personal that you have tested positive for the Coronavirus (COVID-19).  Place mask on and await 911 to arrive.    If Emergency Dept, If possible, please have another adult drive you to the Emergency Dept but you need to wear mask when in contact with other people.      Monoclonal Antibody Administration    Is the patient " symptomatic at the time of result notification? No.  Does the patient fit the criteria: No    Review information with Patient    Your result was positive. This means you have COVID-19 (coronavirus).  We have sent you a letter that reviews the information that I'll be reviewing with you now.    How can I protect others?    If you have symptoms: stay home and away from others (self-isolate) until:    You've had no fever--and no medicine that reduces fever--for 1 full day (24 hours). And       Your other symptoms have gotten better. For example, your cough or breathing has improved. And     At least 10 days have passed since your symptoms started. (If you've been told by a doctor that you have a weak immune system, wait 20 days.)     If you don't have symptoms: Stay home and away from others (self-isolate) until at least 10 days have passed since your first positive COVID-19 test. (Date test collected)    During this time:    Stay in your own room, including for meals. Use your own bathroom if you can.    Stay away from others in your home. No hugging, kissing or shaking hands. No visitors.     Don't go to work, school or anywhere else.     Clean  high touch  surfaces often (doorknobs, counters, handles, etc.). Use a household cleaning spray or wipes. You'll find a full list on the EPA website at www.epa.gov/pesticide-registration/list-n-disinfectants-use-against-sars-cov-2.     Cover your mouth and nose with a mask, tissue or other face covering to avoid spreading germs.    Wash your hands and face often with soap and water.    Make a list of people you have been in close contact with recently, even if either of you wore a face covering.   - Start your list from 2 days before you became ill or had a positive test.  - Include anyone that was within 6 feet of you for a cumulative total of 15 minutes or more in 24 hours. (Example: if you sat next to Hilario for 5 minutes in the morning and 10 minutes in the afternoon, then  you were in close contact for 15 minutes total that day. Hilario would be added to your list.)    A public health worker will call or text you. It is important that you answer. They will ask you questions about possible exposures to COVID-19, such as people you have been in direct contact with and places you have visited.    Tell the people on your list that you have COVID-19; they should stay away from others for 14 days starting from the last time they were in contact with you (unless you are told something different from a public health worker).     Caregivers in these groups are at risk for severe illness due to COVID-19:  o People 65 years and older  o People who live in a nursing home or long-term care facility  o People with chronic disease (lung, heart, cancer, diabetes, kidney, liver, immunologic)  o People who have a weakened immune system, including those who:  - Are in cancer treatment  - Take medicine that weakens the immune system, such as corticosteroids  - Had a bone marrow or organ transplant  - Have an immune deficiency  - Have poorly controlled HIV or AIDS  - Are obese (body mass index of 40 or higher)  - Smoke regularly    Caregivers should wear gloves while washing dishes, handling laundry and cleaning bedrooms and bathrooms.    Wash and dry laundry with special caution. Don't shake dirty laundry, and use the warmest water setting you can.    If you have a weakened immune system, ask your doctor about other actions you should take.    For more tips, go to www.cdc.gov/coronavirus/2019-ncov/downloads/10Things.pdf.    You should not go back to work until you meet the guidelines above for ending your home isolation. You don't need to be retested for COVID-19 before going back to work--studies show that you won't spread the virus if it's been at least 10 days since your symptoms started (or 20 days, if you have a weak immune system).    Employers: This document serves as formal notice of your employee's  medical guidelines for going back to work. They must meet the above guidelines before going back to work in person.    How can I take care of myself?    1. Get lots of rest. Drink extra fluids (unless a doctor has told you not to).    2. Take Tylenol (acetaminophen) for fever or pain. If you have liver or kidney problems, ask your family doctor if it's okay to take Tylenol.     Take either:     650 mg (two 325 mg pills) every 4 to 6 hours, or     1,000 mg (two 500 mg pills) every 8 hours as needed.     Note: Don't take more than 3,000 mg in one day. Acetaminophen is found in many medicines (both prescribed and over-the-counter medicines). Read all labels to be sure you don't take too much.    For children, check the Tylenol bottle for the right dose (based on their age or weight).    3. If you have other health problems (like cancer, heart failure, an organ transplant or severe kidney disease): Call your specialty clinic if you don't feel better in the next 2 days.    4. Know when to call 911: Emergency warning signs include:    Trouble breathing or shortness of breath    Pain or pressure in the chest that doesn't go away    Feeling confused like you haven't felt before, or not being able to wake up    Bluish-colored lips or face    5. Sign up for [employer]. We know it's scary to hear that you have COVID-19. We want to track your symptoms to make sure you're okay over the next 2 weeks. Please look for an email from [employer]--this is a free, online program that we'll use to keep in touch. To sign up, follow the link in the email. Learn more at [email or web address].    Where can I get more information?     [facility]: [email or web address]    Coronavirus Basics: www.health.[facility].[place]/diseases/coronavirus/basics.html    What to Do If You're Sick: www.cdc.gov/coronavirus/2019-ncov/about/steps-when-sick.html    Ending Home Isolation:  www.cdc.gov/coronavirus/2019-ncov/hcp/disposition-in-home-patients.html     Caring for Someone with COVID-19: www.cdc.gov/coronavirus/2019-ncov/if-you-are-sick/care-for-someone.html     [facility] clinical trials (COVID-19 research studies): [email or web address]-clinical-trials     A Positive COVID-19 letter will be sent via [employer] or the mail. (Exception, no letters sent to Presurgerical/Preprocedure Patients)    Jess Grey RN    Reason for Disposition    Health Information question, no triage required and triager able to answer question    Protocols used: INFORMATION ONLY CALL - NO TRIAGE-A-

## 2022-02-06 NOTE — LETTER
DOCUMENTATION OF RECOVERY    Date: 2/6/2022      Name: Chepe Elkins                       YOB: 1965    Medical Record Number: [id number]    The patient was seen at: [facility]    Patient tested + for COVID on 2-4-2022 via PCR testing.    Per CDC guidelines- no further testing is needed x 90 days for travel purposes.   He may travel without repeat testing through 5-4-2022.        _________________________  Taya Carson MD  
None

## 2022-02-06 NOTE — PATIENT INSTRUCTIONS
Please contact your primary care clinic for this letter.  This is not something we can provide in an evisit based on your request today.  No charge for evisit.

## 2022-03-29 ENCOUNTER — TELEPHONE (OUTPATIENT)
Dept: FAMILY MEDICINE | Facility: CLINIC | Age: 57
End: 2022-03-29
Payer: COMMERCIAL

## 2022-03-29 NOTE — TELEPHONE ENCOUNTER
Patient Quality Outreach    Patient is due for the following:   Diabetes -  A1C and Microalbumin    NEXT STEPS:   Schedule a office visit for diabetes    Type of outreach:    Sent [employer] message.      Questions for provider review:         Ara Hollis CMA

## 2022-04-07 DIAGNOSIS — E11.9 TYPE 2 DIABETES MELLITUS WITHOUT COMPLICATION, WITHOUT LONG-TERM CURRENT USE OF INSULIN (H): ICD-10-CM

## 2022-04-21 DIAGNOSIS — N18.1 CKD (CHRONIC KIDNEY DISEASE) STAGE 1, GFR 90 ML/MIN OR GREATER: ICD-10-CM

## 2022-04-21 DIAGNOSIS — E78.5 HYPERLIPIDEMIA LDL GOAL <100: ICD-10-CM

## 2022-04-21 RX ORDER — LISINOPRIL 10 MG/1
10 TABLET ORAL DAILY
Qty: 90 TABLET | Refills: 1 | Status: SHIPPED | OUTPATIENT
Start: 2022-04-21 | End: 2022-10-25

## 2022-04-21 RX ORDER — ATORVASTATIN CALCIUM 20 MG/1
TABLET, FILM COATED ORAL
Qty: 90 TABLET | Refills: 1 | Status: SHIPPED | OUTPATIENT
Start: 2022-04-21 | End: 2022-10-25

## 2022-05-06 DIAGNOSIS — E11.9 TYPE 2 DIABETES MELLITUS WITHOUT COMPLICATION, WITHOUT LONG-TERM CURRENT USE OF INSULIN (H): ICD-10-CM

## 2022-06-12 DIAGNOSIS — E11.9 TYPE 2 DIABETES MELLITUS WITHOUT COMPLICATION, WITHOUT LONG-TERM CURRENT USE OF INSULIN (H): ICD-10-CM

## 2022-06-16 ENCOUNTER — OFFICE VISIT (OUTPATIENT)
Dept: FAMILY MEDICINE | Facility: CLINIC | Age: 57
End: 2022-06-16
Payer: COMMERCIAL

## 2022-06-16 ENCOUNTER — TELEPHONE (OUTPATIENT)
Dept: OTHER | Facility: CLINIC | Age: 57
End: 2022-06-16

## 2022-06-16 VITALS
BODY MASS INDEX: 40.77 KG/M2 | RESPIRATION RATE: 16 BRPM | OXYGEN SATURATION: 95 % | HEIGHT: 72 IN | WEIGHT: 301 LBS | SYSTOLIC BLOOD PRESSURE: 120 MMHG | DIASTOLIC BLOOD PRESSURE: 70 MMHG | TEMPERATURE: 97.5 F | HEART RATE: 92 BPM

## 2022-06-16 DIAGNOSIS — R20.0 RIGHT ARM NUMBNESS: ICD-10-CM

## 2022-06-16 DIAGNOSIS — M79.605 PAIN IN BOTH LOWER EXTREMITIES: ICD-10-CM

## 2022-06-16 DIAGNOSIS — E11.9 TYPE 2 DIABETES MELLITUS WITHOUT COMPLICATION, WITHOUT LONG-TERM CURRENT USE OF INSULIN (H): Primary | ICD-10-CM

## 2022-06-16 DIAGNOSIS — E66.01 MORBID OBESITY (H): ICD-10-CM

## 2022-06-16 DIAGNOSIS — I73.9 PAD (PERIPHERAL ARTERY DISEASE) (H): Primary | ICD-10-CM

## 2022-06-16 DIAGNOSIS — N18.1 CKD (CHRONIC KIDNEY DISEASE) STAGE 1, GFR 90 ML/MIN OR GREATER: ICD-10-CM

## 2022-06-16 DIAGNOSIS — M79.604 PAIN IN BOTH LOWER EXTREMITIES: ICD-10-CM

## 2022-06-16 DIAGNOSIS — E78.5 HYPERLIPIDEMIA LDL GOAL <100: ICD-10-CM

## 2022-06-16 LAB
ANION GAP SERPL CALCULATED.3IONS-SCNC: 7 MMOL/L (ref 3–14)
BUN SERPL-MCNC: 25 MG/DL (ref 7–30)
CALCIUM SERPL-MCNC: 9.2 MG/DL (ref 8.5–10.1)
CHLORIDE BLD-SCNC: 99 MMOL/L (ref 94–109)
CO2 SERPL-SCNC: 26 MMOL/L (ref 20–32)
CREAT SERPL-MCNC: 0.77 MG/DL (ref 0.66–1.25)
CREAT UR-MCNC: 51 MG/DL
GFR SERPL CREATININE-BSD FRML MDRD: >90 ML/MIN/1.73M2
GLUCOSE BLD-MCNC: 244 MG/DL (ref 70–99)
HBA1C MFR BLD: 8.1 % (ref 0–5.6)
HOLD SPECIMEN: NORMAL
MICROALBUMIN UR-MCNC: 7 MG/L
MICROALBUMIN/CREAT UR: 13.73 MG/G CR (ref 0–17)
POTASSIUM BLD-SCNC: 4.5 MMOL/L (ref 3.4–5.3)
SODIUM SERPL-SCNC: 132 MMOL/L (ref 133–144)

## 2022-06-16 PROCEDURE — 36415 COLL VENOUS BLD VENIPUNCTURE: CPT | Performed by: FAMILY MEDICINE

## 2022-06-16 PROCEDURE — 99207 PR FOOT EXAM NO CHARGE: CPT | Performed by: FAMILY MEDICINE

## 2022-06-16 PROCEDURE — 82043 UR ALBUMIN QUANTITATIVE: CPT | Performed by: FAMILY MEDICINE

## 2022-06-16 PROCEDURE — 83036 HEMOGLOBIN GLYCOSYLATED A1C: CPT | Performed by: FAMILY MEDICINE

## 2022-06-16 PROCEDURE — 80048 BASIC METABOLIC PNL TOTAL CA: CPT | Performed by: FAMILY MEDICINE

## 2022-06-16 PROCEDURE — 99214 OFFICE O/P EST MOD 30 MIN: CPT | Performed by: FAMILY MEDICINE

## 2022-06-16 ASSESSMENT — PATIENT HEALTH QUESTIONNAIRE - PHQ9
10. IF YOU CHECKED OFF ANY PROBLEMS, HOW DIFFICULT HAVE THESE PROBLEMS MADE IT FOR YOU TO DO YOUR WORK, TAKE CARE OF THINGS AT HOME, OR GET ALONG WITH OTHER PEOPLE: NOT DIFFICULT AT ALL
SUM OF ALL RESPONSES TO PHQ QUESTIONS 1-9: 6
SUM OF ALL RESPONSES TO PHQ QUESTIONS 1-9: 6

## 2022-06-16 ASSESSMENT — PAIN SCALES - GENERAL: PAINLEVEL: SEVERE PAIN (7)

## 2022-06-16 NOTE — PROGRESS NOTES
ASSESSMENT/PLAN:                                                    Lifestyle recommendations:regular exercise, at least 150 minutes per week (average 30 minutes 5 times a week)  good job on losing weight!  keep working on losing weight (ideal BMI-body mass index is <25)  Diabetic recommendations:-return for follow-up visit in 3 month(s)    (E11.9) Type 2 diabetes mellitus without complication, without long-term current use of insulin (H)  (primary encounter diagnosis)  Comment: Hgb A1C is borderline.   Plan: BASIC METABOLIC PANEL, Albumin Random Urine         Quantitative with Creat Ratio, HEMOGLOBIN A1C,         metFORMIN (GLUCOPHAGE) 1000 MG tablet, FOOT         EXAM, semaglutide (OZEMPIC) 2 MG/1.5ML SOPN pen          Stop the liraglutide-Victoza.  Take semaglutide (Ozempic) instead if insurance covers.    Start with injection once weekly 0.25mg for 4 weeks, then 0.5mg once weekly.    If glucometers are often high, we can increase the dose further.  This medication often helps for weight.     (E66.01) Morbid obesity (H)  Comment: working on weight.     (N18.1) CKD (chronic kidney disease) stage 1, GFR 90 ml/min or greater  Comment: consequence of diabetes mellitus.   Plan: Good control of diabetes mellitus, blood pressure <140/90 and the ACE/ARB category drugs like lisinopril can all help prevent further kidney damage.     (E78.5) Hyperlipidemia LDL goal <100  Comment: due for follow-up   Plan: Non-fasting blood tests today.     (M79.604,  M79.605) Pain in both lower extremities  Comment: R/O arterioal insufficiency as cause of pain.  Could also be related to varicose veins.   Plan: Vascular Surgery Referral        Schedule an appointment with vascular specialists.     (R20.0) Right arm numbness  Comment: uncertain cause  Plan: Pay attention to what triggers the numbness.        Diabetes complications/Comorbid conditions:  Nephropathy:ckd    Retinopathy: no  Neuropathy: no  Vascular disease: ?  High/low problems:  no  Tobacco use: no    Treatments:  Statin: atorvastatin   Exercise: some weights.   ACE: lisinopril   ASA: no on rivaroxaban (Xarelto)         Page Mo is a 56 year old  Chief Complaint   Patient presents with     Diabetes         Leg pain, bilateral.   Onset of symptoms: years.  Course of symptoms over time: worse.   Alleviating factors: compression stockings.   Location: below knees, calves and above ankles.   Constant feeling of twisting.     Can hurt when walking.  Has to sit down sometimes when walking.   Seems better in the AM.  Worse later in the day.   Sore lying in bed as well.   Can have leg cramps if gets dehydrated.   No leg numbness or pain in feet.   Some pain in knees.    Alleviating factors: acetaminophen 650mg about 4 pills a day.    Some leg swelling if no compression stockings.     right arm falls asleep. Tingly.  More upper arm.  Little pain.  No numbness in hand.   No shoulder pain.  No neck pain.     Diabetes Follow-up      How often are you checking your blood sugar? A few x a month.  Glucometer range within the past month=105-155.      Diabetes medications currently taking: empagliflozin (Jardiance), liraglutide 1.8, metformin 1000mg twice daily.   Lab Results   Component Value Date    A1C 8.1 06/16/2022    A1C 9.0 04/02/2021            What concerns do you have today about your diabetes?      Do you have any of these symptoms? (Select all that apply)  No numbness or tingling in feet.  No redness, sores or blisters on feet.  No complaints of excessive thirst.  No reports of blurry vision.  No significant changes to weight.    Have you had a diabetic eye exam in the last 12 months? No        Hyperlipidemia Follow-Up      Are you regularly taking any medication or supplement to lower your cholesterol?   Yes- Lipitor    Are you having muscle aches or other side effects that you think could be caused by your cholesterol lowering medication?  No    Hypertension Follow-up      Do you  check your blood pressure regularly outside of the clinic? No     Are you following a low salt diet? No    Are your blood pressures ever more than 140 on the top number (systolic) OR more   than 90 on the bottom number (diastolic), for example 140/90? No    BP Readings from Last 2 Encounters:   06/16/22 120/70   11/08/21 120/70     Hemoglobin A1C POCT (%)   Date Value   04/02/2021 9.0 (H)   09/28/2020 7.9 (H)     Hemoglobin A1C (%)   Date Value   11/08/2021 8.1 (H)     LDL Cholesterol Calculated (mg/dL)   Date Value   11/08/2021 37   09/28/2020 51   08/19/2019 45     Last clinic visit:11/8/21  Medication or other changes at last visit:none  Weight since last visit?  Down 4#  Wt Readings from Last 5 Encounters:   06/16/22 136.5 kg (301 lb)   11/08/21 138.3 kg (305 lb)   04/02/21 143.3 kg (316 lb)   09/28/20 140.2 kg (309 lb)   03/09/20 142.8 kg (314 lb 12.8 oz)      History   Smoking Status     Former Smoker     Packs/day: 1.00     Years: 25.00     Types: Cigarettes     Quit date: 2/26/2010   Smokeless Tobacco     Never Used     Comment: started at age 19       Past medical history reviewed and updated    Patient Active Problem List    Diagnosis Date Noted     CKD (chronic kidney disease) stage 1, GFR 90 ml/min or greater 09/28/2020     Priority: Medium     Hyperlipidemia LDL goal <100 02/22/2019     Priority: Medium     Acute deep vein thrombosis (DVT) of popliteal vein of right lower extremity (H) 03/28/2018     Priority: Medium     3/28/2018:Unprovoked.  PLAN: indefinite anticoagulation.        Osteoarthritis of both knees, unspecified osteoarthritis type 03/28/2018     Priority: Medium     Type 2 diabetes mellitus without complication, without long-term current use of insulin (H) 03/19/2018     Priority: Medium     Morbid obesity (H) 03/19/2018     Priority: Medium     Current Outpatient Medications   Medication Sig Dispense Refill     empagliflozin (JARDIANCE) 25 MG TABS tablet Take 1 tablet (25 mg) by mouth  daily 90 tablet 3     liraglutide (VICTOZA PEN) 18 MG/3ML solution TAKE 1.8 MG UNDER THE SKIN DAILY IN THE MORNING 27 mL 3     lisinopril (ZESTRIL) 10 MG tablet TAKE 1 TABLET (10 MG) BY MOUTH DAILY 90 tablet 1     metFORMIN (GLUCOPHAGE) 1000 MG tablet TAKE 1 TABLET (1,000 MG) BY MOUTH 2 TIMES DAILY WITH MEALS DUE FOR DIABETES APPOINTMENT 60 tablet 0     Multiple Vitamin (MULTI VITAMIN MENS PO) Take 1 tablet by mouth daily       sildenafil (REVATIO) 20 MG tablet Sidenafil (Viagra) comes in 20mg strength pills. Take as many pills as needed up to maximum of 5 pills at a time prior to intercourse. 50 tablet 11     XARELTO ANTICOAGULANT 20 MG TABS tablet TAKE ONE TABLET BY MOUTH ONCE DAILY WITH DINNER 30 tablet 2     ASPIRIN NOT PRESCRIBED (INTENTIONAL) Please choose reason not prescribed, below       atorvastatin (LIPITOR) 20 MG tablet TAKE ONE TABLET BY MOUTH ONCE DAILY 90 tablet 1     BD JAVI U/F 32G X 4 MM insulin pen needle USE DAILY WITH VICTOZA 100 each 2     blood glucose (NO BRAND SPECIFIED) lancets standard Use to test blood sugar 1 time daily. 100 each 3     blood glucose (NO BRAND SPECIFIED) test strip Use to test blood sugar 1 time daily. 100 strip 3     blood glucose monitoring (NO BRAND SPECIFIED) meter device kit Use to test blood sugar 2-3 times daily or as directed. 1 kit 0     ROS:General: No change in weight, sleep or appetite.  Normal energy.  No fever or chills  Resp: No coughing, wheezing or shortness of breath  CV: No chest pains or palpitations  GI: No nausea, vomiting,  heartburn, abdominal pain, diarrhea, constipation or change in bowel habits  : No urinary frequency or dysuria, bladder or kidney problems  Musculoskeletal: No significant muscle or joint pains  Psychiatric: No problems with anxiety, depression or mental health    OBJECTIVE:Blood pressure 120/70, pulse 92, temperature 97.5  F (36.4  C), temperature source Tympanic, resp. rate 16, height 1.829 m (6'), weight 136.5 kg (301 lb),  SpO2 95 %. BMI=Body mass index is 40.82 kg/m .  GENERAL APPEARANCE ADULT: Alert, no acute distress  MS: neck exam: normal C-spine, no tenderness, full range of motion without pain  knee exam normal appearance, tenderness to palpation-none, range of motion mild decrease in flexion bilateral.  Full extension bilateral.   leg He has varicosities both legs to mild to moderate degree.   No edema today.  No erythema, non-tender.      Foot exam:no trophic changes or ulcerative lesions, normal monofilament exam and DP pulse-OK left foot.  I am unable to palpate other pulses PT or right DP

## 2022-06-16 NOTE — NURSING NOTE
Chief Complaint   Patient presents with     Diabetes       Initial /70   Pulse 92   Temp 97.5  F (36.4  C) (Tympanic)   Resp 16   Ht 1.829 m (6')   Wt 136.5 kg (301 lb)   SpO2 95%   BMI 40.82 kg/m   Estimated body mass index is 40.82 kg/m  as calculated from the following:    Height as of this encounter: 1.829 m (6').    Weight as of this encounter: 136.5 kg (301 lb).    Patient presents to the clinic using     Health Maintenance that is potentially due pending provider review:          Is there anyone who you would like to be able to receive your results?   If yes have patient fill out SINDY

## 2022-06-16 NOTE — TELEPHONE ENCOUNTER
Scheduled as follows:    Future Appointments   Date Time Provider Department Center   7/15/2022  9:00 AM [id number]   7/15/2022 10:10 AM Jermaine Mcdaniels MD [facility]         Radha Subramanian    [facility]   [phone]

## 2022-06-16 NOTE — RESULT ENCOUNTER NOTE
Alejo Mo,   Glucose higoh and Hgb A1C above goal as we discussed.   Sodium a little low which has been present in the past and is mild.   PLAN: Change to the semaglutide (Ozempic) and stop liraglutide if insurance covers.   follow-up in three months as planned.   ROSIE ORTEGA MD

## 2022-06-16 NOTE — TELEPHONE ENCOUNTER
Pt referred to [facility] by Bean Eduardo MD for leg pain    Patient has no recent imaging.    Pt needs to be scheduled for RONA US and consult with vascular medicine.  Will route to scheduling to coordinate an appointment at next available.    Appointment note: Referred to [facility] by Bean Eduardo MD for leg pain. RONA prior      Leilani WERNER, RN    [facility]  Office: [phone]  Fax: [phone]

## 2022-07-12 DIAGNOSIS — I82.431 ACUTE DEEP VEIN THROMBOSIS (DVT) OF POPLITEAL VEIN OF RIGHT LOWER EXTREMITY (H): ICD-10-CM

## 2022-07-13 ENCOUNTER — TRANSFERRED RECORDS (OUTPATIENT)
Dept: FAMILY MEDICINE | Facility: CLINIC | Age: 57
End: 2022-07-13

## 2022-07-13 LAB — RETINOPATHY: NEGATIVE

## 2022-07-13 RX ORDER — RIVAROXABAN 20 MG/1
TABLET, FILM COATED ORAL
Qty: 30 TABLET | Refills: 11 | Status: SHIPPED | OUTPATIENT
Start: 2022-07-13 | End: 2023-04-17

## 2022-07-13 NOTE — TELEPHONE ENCOUNTER
Last Written Prescription Date:  04/07/22  Last Fill Quantity: 30,  # refills: 2   Last office visit: 6/16/2022 with prescribing provider:       Requested Prescriptions   Pending Prescriptions Disp Refills    XARELTO ANTICOAGULANT 20 MG TABS tablet [Pharmacy Med Name: XARELTO 20MG TABS] 30 tablet 2     Sig: TAKE ONE TABLET BY MOUTH ONCE DAILY WITH DINNER        Direct Oral Anticoagulant Agents Failed - 7/12/2022  6:22 PM        Failed - Normal Platelets on file in past 12 months     No lab results found.            Failed - Creatinine Clearance greater than 50 ml/min on file in past 3 mos     No lab results found.            Passed - Medication is active on med list        Passed - Serum creatinine less than or equal to 1.4 on file in past 3 mos     Recent Labs   Lab Test 06/16/22  0709   CR 0.77       Ok to refill medication if creatinine is low          Passed - Patient is 18 years of age or older        Passed - Recent (6 mo) or future (30 days) visit within the authorizing provider's specialty            Lesa TAMAYO RN

## 2022-07-15 ENCOUNTER — HOSPITAL ENCOUNTER (OUTPATIENT)
Dept: ULTRASOUND IMAGING | Facility: CLINIC | Age: 57
Discharge: HOME | End: 2022-07-15
Attending: INTERNAL MEDICINE
Payer: COMMERCIAL

## 2022-07-15 ENCOUNTER — OFFICE VISIT (OUTPATIENT)
Dept: OTHER | Facility: CLINIC | Age: 57
End: 2022-07-15
Attending: FAMILY MEDICINE
Payer: COMMERCIAL

## 2022-07-15 VITALS
HEART RATE: 83 BPM | HEIGHT: 73 IN | DIASTOLIC BLOOD PRESSURE: 82 MMHG | OXYGEN SATURATION: 95 % | WEIGHT: 301.2 LBS | SYSTOLIC BLOOD PRESSURE: 136 MMHG | BODY MASS INDEX: 39.92 KG/M2

## 2022-07-15 DIAGNOSIS — M79.604 PAIN IN BOTH LOWER EXTREMITIES: ICD-10-CM

## 2022-07-15 DIAGNOSIS — M79.605 PAIN IN BOTH LOWER EXTREMITIES: ICD-10-CM

## 2022-07-15 DIAGNOSIS — I73.9 PAD (PERIPHERAL ARTERY DISEASE) (H): ICD-10-CM

## 2022-07-15 PROCEDURE — G0463 HOSPITAL OUTPT CLINIC VISIT: HCPCS

## 2022-07-15 PROCEDURE — 93924 LWR XTR VASC STDY BILAT: CPT

## 2022-07-15 PROCEDURE — 99204 OFFICE O/P NEW MOD 45 MIN: CPT | Performed by: INTERNAL MEDICINE

## 2022-07-15 NOTE — PROGRESS NOTES
"Patient is here to discuss consult.    /82 (BP Location: Left arm, Patient Position: Chair, Cuff Size: Adult Regular)   Pulse 83   Ht 6' 1\" (1.854 m)   Wt 301 lb 3.2 oz (136.6 kg)   SpO2 95%   BMI 39.74 kg/m      Questions patient would like addressed today are: N/A.    Refills are needed: N/A    Has homecare services and agency name:  Eugenia Daly  "

## 2022-07-15 NOTE — PROGRESS NOTES
INITIAL VASCULAR MEDICINE ASSESSMENT  REFERRAL SOURCE: Bean Eduardo MD   REASON FOR CONSULT: for leg pain    HPI: Chepe Elkins is a hypertensive hyperlipidemic type 2 diabetic  with a 4-5 year h/o RLE > LLE pain . He has OA. He notes he has no true claudication. He notes the pain is better when he wears compression hosiery . He was referred by his PCP to r/o PAD. ABIs are normal. He has easily palpable distal pulses.      Review Of Systems  Skin: negative  Eyes: negative  Ears/Nose/Throat: negative  Respiratory: No shortness of breath, dyspnea on exertion, cough, or hemoptysis  Cardiovascular: negative  Gastrointestinal: negative  Genitourinary: negative  Musculoskeletal: as above  Neurologic: negative  Psychiatric: negative  Hematologic/Lymphatic/Immunologic: negative  Endocrine: negative      PAST MEDICAL HISTORY:                  Past Medical History:   Diagnosis Date     Arthritis same     Diabetes (H) on file       PAST SURGICAL HISTORY:                No past surgical history on file.    CURRENT MEDICATIONS:                  Current Outpatient Medications   Medication Sig Dispense Refill     ASPIRIN NOT PRESCRIBED (INTENTIONAL) Please choose reason not prescribed, below       atorvastatin (LIPITOR) 20 MG tablet TAKE ONE TABLET BY MOUTH ONCE DAILY 90 tablet 1     BD JAVI U/F 32G X 4 MM insulin pen needle USE DAILY WITH VICTOZA 100 each 2     blood glucose (NO BRAND SPECIFIED) lancets standard Use to test blood sugar 1 time daily. 100 each 3     blood glucose (NO BRAND SPECIFIED) test strip Use to test blood sugar 1 time daily. 100 strip 3     blood glucose monitoring (NO BRAND SPECIFIED) meter device kit Use to test blood sugar 2-3 times daily or as directed. 1 kit 0     empagliflozin (JARDIANCE) 25 MG TABS tablet Take 1 tablet (25 mg) by mouth daily 90 tablet 3     lisinopril (ZESTRIL) 10 MG tablet TAKE 1 TABLET (10 MG) BY MOUTH DAILY 90 tablet 1     metFORMIN (GLUCOPHAGE) 1000 MG  tablet TAKE 1 TABLET (1,000 MG) BY MOUTH 2 TIMES DAILY WITH MEALS DUE FOR DIABETES APPOINTMENT 180 tablet 3     Multiple Vitamin (MULTI VITAMIN MENS PO) Take 1 tablet by mouth daily       semaglutide (OZEMPIC) 2 MG/1.5ML SOPN pen 0.25mg once weekly for 4 weeks, then 0.5mg weekly. 1.5 mL 1     sildenafil (REVATIO) 20 MG tablet Sidenafil (Viagra) comes in 20mg strength pills. Take as many pills as needed up to maximum of 5 pills at a time prior to intercourse. 50 tablet 11     XARELTO ANTICOAGULANT 20 MG TABS tablet TAKE ONE TABLET BY MOUTH ONCE DAILY WITH DINNER 30 tablet 11       ALLERGIES:                No Known Allergies    SOCIAL HISTORY:                  Social History     Socioeconomic History     Marital status:      Spouse name: Not on file     Number of children: Not on file     Years of education: Not on file     Highest education level: Not on file   Occupational History     Not on file   Tobacco Use     Smoking status: Former Smoker     Packs/day: 1.00     Years: 25.00     Pack years: 25.00     Types: Cigarettes     Quit date: 2010     Years since quittin.3     Smokeless tobacco: Never Used     Tobacco comment: started at age 19   Substance and Sexual Activity     Alcohol use: Yes     Comment: rarely     Drug use: No     Sexual activity: Yes     Partners: Female   Other Topics Concern     Parent/sibling w/ CABG, MI or angioplasty before 65F 55M? No   Social History Narrative     Not on file     Social Determinants of Health     Financial Resource Strain: Not on file   Food Insecurity: Not on file   Transportation Needs: Not on file   Physical Activity: Not on file   Stress: Not on file   Social Connections: Not on file   Intimate Partner Violence: Not on file   Housing Stability: Not on file       FAMILY HISTORY:                   Family History   Problem Relation Age of Onset     Diabetes Mother      Prostate Cancer No family hx of      Colon Cancer No family hx of          Physical  exam Reveals:    O/P: WNL  HEENT: WNL  NECK: No JVD, thyromegaly, or lymphadenopathy  HEART: RRR, no murmurs, gallops, or rubs  LUNGS: CTA bilaterally without rales, wheezes, or rhonchi  GI: NABS, nondistended, nontender, soft  EXT:without cyanosis, clubbing, or edema  NEURO: nonfocal  : no flank tenderness      Rt femoral: 3 plus  Rt popliteal: 3 plus  Rt DP:  3 plus  Rt PT:  3 plus    Lt femoral: 3 plus  Lt popliteal: 3 plus  Lt DP:  3 plus  Lt PT:  3 plus        Component      Latest Ref Rng & Units 11/8/2021   Sodium      133 - 144 mmol/L    Potassium      3.4 - 5.3 mmol/L    Chloride      94 - 109 mmol/L    Carbon Dioxide      20 - 32 mmol/L    Anion Gap      3 - 14 mmol/L    Urea Nitrogen      7 - 30 mg/dL    Creatinine      0.66 - 1.25 mg/dL    Calcium      8.5 - 10.1 mg/dL    Glucose      70 - 99 mg/dL    GFR Estimate      >60 mL/min/1.73m2    Cholesterol      <200 mg/dL 123   Triglycerides      <150 mg/dL 242 (H)   HDL Cholesterol      >=40 mg/dL 38 (L)   LDL Cholesterol Calculated      <=100 mg/dL 37   Non HDL Cholesterol      <130 mg/dL 85   Creatinine Urine      mg/dL    Albumin Urine mg/L      mg/L    Albumin Urine mg/g Cr      0.00 - 17.00 mg/g Cr    Hemoglobin A1C      0.0 - 5.6 % 8.1 (H)   SARS CoV2 PCR      Negative, Testing sent to reference lab. Results will be returned via unsolicited result        Component      Latest Ref Rng & Units 2/4/2022 6/16/2022   Sodium      133 - 144 mmol/L  132 (L)   Potassium      3.4 - 5.3 mmol/L  4.5   Chloride      94 - 109 mmol/L  99   Carbon Dioxide      20 - 32 mmol/L  26   Anion Gap      3 - 14 mmol/L  7   Urea Nitrogen      7 - 30 mg/dL  25   Creatinine      0.66 - 1.25 mg/dL  0.77   Calcium      8.5 - 10.1 mg/dL  9.2   Glucose      70 - 99 mg/dL  244 (H)   GFR Estimate      >60 mL/min/1.73m2  >90   Cholesterol      <200 mg/dL     Triglycerides      <150 mg/dL     HDL Cholesterol      >=40 mg/dL     LDL Cholesterol Calculated      <=100 mg/dL     Non HDL  Cholesterol      <130 mg/dL     Creatinine Urine      mg/dL  51   Albumin Urine mg/L      mg/L  7   Albumin Urine mg/g Cr      0.00 - 17.00 mg/g Cr  13.73   Hemoglobin A1C      0.0 - 5.6 %  8.1 (H)   SARS CoV2 PCR      Negative, Testing sent to reference lab. Results will be returned via unsolicited result Positive (A)      US RONA DOPPLER WITH EXERCISE BILATERAL   7/15/2022 8:55 AM      HISTORY: PAD (peripheral artery disease) (H).     COMPARISON: None.     FINDINGS:  Right RONA:   PT: 1.29  DP: 1.25     Left RONA:   PT: 1.18  DP: 1.17      Right Digital brachial index: 0.72  Left Digital Brachial index: 0.76     Waveforms: Triphasic bilaterally     Exercise: The patient was exercised on a treadmill at 1.5 mph at a 10%  incline for 5 minutes total. General bilateral leg pain.     Right exercise RONA: 1.35  Left exercise RONA: 1.24                                                                      IMPRESSION: Normal.     RONA CRITERIA:  >1.4 NC  0.95-1.4 Normal  0.90 - 0.94 Mild  0.5 - 0.89 Moderate  0.2 - 0.49 Severe  <0.2 Critical    This result has not been signed. Information might be incomplete.       A/P:    (M79.604,  M79.605) Pain in both lower extremities  Comment: This is not due to PAD. It is due to venous insufficiency.  Plan: Wear compression hosiery, lose weight, RTC prn    45 minutes total medical care on today's date.

## 2022-07-29 ENCOUNTER — HOSPITAL ENCOUNTER (OUTPATIENT)
Dept: OUTPATIENT PROCEDURES | Facility: CLINIC | Age: 57
Discharge: HOME | End: 2022-07-29
Attending: OPHTHALMOLOGY | Admitting: OPHTHALMOLOGY
Payer: COMMERCIAL

## 2022-07-29 PROCEDURE — 66821 AFTER CATARACT LASER SURGERY: CPT | Mod: LT

## 2022-08-27 DIAGNOSIS — E11.9 TYPE 2 DIABETES MELLITUS WITHOUT COMPLICATION, WITHOUT LONG-TERM CURRENT USE OF INSULIN (H): ICD-10-CM

## 2022-08-29 RX ORDER — SEMAGLUTIDE 1.34 MG/ML
INJECTION, SOLUTION SUBCUTANEOUS
Qty: 1.5 ML | Refills: 1 | Status: SHIPPED | OUTPATIENT
Start: 2022-08-29 | End: 2022-10-18

## 2022-10-17 DIAGNOSIS — E11.9 TYPE 2 DIABETES MELLITUS WITHOUT COMPLICATION, WITHOUT LONG-TERM CURRENT USE OF INSULIN (H): ICD-10-CM

## 2022-10-18 RX ORDER — SEMAGLUTIDE 1.34 MG/ML
INJECTION, SOLUTION SUBCUTANEOUS
Qty: 1.5 ML | Refills: 1 | Status: SHIPPED | OUTPATIENT
Start: 2022-10-18 | End: 2022-12-14

## 2022-10-18 NOTE — TELEPHONE ENCOUNTER
Routing to a covering provider for consideration pt had A1c in June just days out of the 6 mo range  Hemoglobin A1C   8.1 High

## 2022-10-25 DIAGNOSIS — N18.1 CKD (CHRONIC KIDNEY DISEASE) STAGE 1, GFR 90 ML/MIN OR GREATER: ICD-10-CM

## 2022-10-25 DIAGNOSIS — E78.5 HYPERLIPIDEMIA LDL GOAL <100: ICD-10-CM

## 2022-10-25 RX ORDER — ATORVASTATIN CALCIUM 20 MG/1
TABLET, FILM COATED ORAL
Qty: 90 TABLET | Refills: 1 | Status: SHIPPED | OUTPATIENT
Start: 2022-10-25 | End: 2023-01-18

## 2022-10-25 RX ORDER — LISINOPRIL 10 MG/1
10 TABLET ORAL DAILY
Qty: 90 TABLET | Refills: 1 | Status: SHIPPED | OUTPATIENT
Start: 2022-10-25 | End: 2023-01-18

## 2022-11-18 ENCOUNTER — TELEPHONE (OUTPATIENT)
Dept: FAMILY MEDICINE | Facility: CLINIC | Age: 57
End: 2022-11-18

## 2022-11-18 NOTE — TELEPHONE ENCOUNTER
Reason for Call:  Other    Detailed comments: Pt says he was due to give himself an Ozempic injection today but the pharmacy says it is currently on back order and they don't know when they will be able to get this. He wonders what he is to do.     Phone Number Patient can be reached at: Home number on file [phone] (home)    Best Time: anytime    Can we leave a detailed message on this number? YES    Call taken on 11/18/2022 at 9:53 AM by Cassandra Arias

## 2022-11-18 NOTE — TELEPHONE ENCOUNTER
Ozempic will be available pharmacy will have it available on 11/21/22  left message for patient to return call.  Dionne Barbosa RN    He is going to wait until 11/21/22  Dionne Barbosa RN

## 2022-11-20 DIAGNOSIS — E11.9 TYPE 2 DIABETES MELLITUS WITHOUT COMPLICATION, WITHOUT LONG-TERM CURRENT USE OF INSULIN (H): ICD-10-CM

## 2022-11-25 RX ORDER — EMPAGLIFLOZIN 25 MG/1
TABLET, FILM COATED ORAL
Qty: 90 TABLET | Refills: 3 | Status: SHIPPED | OUTPATIENT
Start: 2022-11-25 | End: 2023-09-08

## 2022-11-25 NOTE — TELEPHONE ENCOUNTER
"Requested Prescriptions   Pending Prescriptions Disp Refills    JARDIANCE 25 MG TABS tablet [Pharmacy Med Name: JARDIANCE 25MG TABS] 90 tablet 3     Sig: TAKE 1 TABLET (25 MG) BY MOUTH DAILY       Sodium Glucose Co-Transport Inhibitor Agents Failed - 11/20/2022  6:05 PM        Failed - Patient has documented A1c within the specified period of time.     If HgbA1C is 8 or greater, it needs to be on file within the past 3 months.  If less than 8, must be on file within the past 6 months.     Recent Labs   Lab Test 06/16/22  0709   A1C 8.1*             Passed - No creatinine >1.4 or GFR <45 within the past 12 mos     Recent Labs   Lab Test 06/16/22  0709 04/02/21  0812   GFRESTIMATED >90 >90   GFRESTBLACK  --  >90       Recent Labs   Lab Test 06/16/22  0709   CR 0.77             Passed - Medication is active on med list        Passed - Patient is age 18 or older        Passed - Patient has documented normal Potassium within the last 12 mos.     Recent Labs   Lab Test 06/16/22  0709   POTASSIUM 4.5             Passed - Recent (6 mo) or future (30 days) visit within the authorizing provider's specialty     Patient had office visit in the last 6 months or has a visit in the next 30 days with authorizing provider or within the authorizing provider's specialty.  See \"Patient Info\" tab in inbasket, or \"Choose Columns\" in Meds & Orders section of the refill encounter.               Ghazala Ledezma RN     "

## 2022-11-25 NOTE — TELEPHONE ENCOUNTER
Message left on pt identified VM asking him to call and make an appt with his provider as he is overdue for diabetes follow-up.  Ghazala Ledezma RN

## 2022-12-14 DIAGNOSIS — E11.9 TYPE 2 DIABETES MELLITUS WITHOUT COMPLICATION, WITHOUT LONG-TERM CURRENT USE OF INSULIN (H): ICD-10-CM

## 2022-12-14 RX ORDER — SEMAGLUTIDE 1.34 MG/ML
INJECTION, SOLUTION SUBCUTANEOUS
Qty: 1.5 ML | Refills: 1 | Status: SHIPPED | OUTPATIENT
Start: 2022-12-14 | End: 2023-01-18 | Stop reason: DRUGHIGH

## 2022-12-14 NOTE — TELEPHONE ENCOUNTER
Routing refill request to provider for review/approval because:  Labs not current:    Hemoglobin A1C   Date Value Ref Range Status   06/16/2022 8.1 (H) 0.0 - 5.6 % Final     Comment:     Normal <5.7%   Prediabetes 5.7-6.4%    Diabetes 6.5% or higher     Note: Adopted from ADA consensus guidelines.   11/08/2021 8.1 (H) 0.0 - 5.6 % Final     Comment:     Normal <5.7%   Prediabetes 5.7-6.4%    Diabetes 6.5% or higher     Note: Adopted from ADA consensus guidelines.   04/02/2021 9.0 (H) 0 - 5.6 % Final     Comment:     Normal <5.7% Prediabetes 5.7-6.4%  Diabetes 6.5% or higher - adopted from ADA   consensus guidelines.     09/28/2020 7.9 (H) 0 - 5.6 % Final     Comment:     Normal <5.7% Prediabetes 5.7-6.4%  Diabetes 6.5% or higher - adopted from ADA   consensus guidelines.     03/09/2020 7.4 (H) 0 - 5.6 % Final     Comment:     Normal <5.7% Prediabetes 5.7-6.4%  Diabetes 6.5% or higher - adopted from ADA   consensus guidelines.        Patient needs to be seen because:  Has not seen provider for > 6 months.  Julie Behrendt RN

## 2023-01-03 ENCOUNTER — VIRTUAL VISIT (OUTPATIENT)
Dept: FAMILY MEDICINE | Facility: CLINIC | Age: 58
End: 2023-01-03
Payer: COMMERCIAL

## 2023-01-03 DIAGNOSIS — E78.5 HYPERLIPIDEMIA LDL GOAL <100: ICD-10-CM

## 2023-01-03 DIAGNOSIS — U07.1 INFECTION DUE TO 2019 NOVEL CORONAVIRUS: Primary | ICD-10-CM

## 2023-01-03 DIAGNOSIS — I82.431 ACUTE DEEP VEIN THROMBOSIS (DVT) OF POPLITEAL VEIN OF RIGHT LOWER EXTREMITY (H): ICD-10-CM

## 2023-01-03 DIAGNOSIS — N18.1 CKD (CHRONIC KIDNEY DISEASE) STAGE 1, GFR 90 ML/MIN OR GREATER: ICD-10-CM

## 2023-01-03 DIAGNOSIS — E11.9 TYPE 2 DIABETES MELLITUS WITHOUT COMPLICATION, WITHOUT LONG-TERM CURRENT USE OF INSULIN (H): ICD-10-CM

## 2023-01-03 DIAGNOSIS — E66.01 MORBID OBESITY (H): ICD-10-CM

## 2023-01-03 PROCEDURE — 99214 OFFICE O/P EST MOD 30 MIN: CPT | Mod: 93 | Performed by: FAMILY MEDICINE

## 2023-01-03 NOTE — PROGRESS NOTES
"Chepe is a 57 year old who is being evaluated via a billable telephone visit.      What phone number would you like to be contacted at? [phone]  How would you like to obtain your AVS? Tammy    Distant Location (provider location):  On-site    Assessment & Plan     Infection due to 2019 novel coronavirus  We did discussion about goals of antiviral treatment as well as risks and benefits.  He certainly needs risk factor criteria and timeframe.  Interested in therapy.  Discussed medication interactions, primarily a atorvastatin and Xarelto.  He should hold off on these during the 5 days of therapy and then should be able to resume, especially the Xarelto.  - nirmatrelvir and ritonavir (PAXLOVID) therapy pack; Take 3 tablets by mouth 2 times daily for 5 days (Take 2 Nirmatrelvir tablets and 1 Ritonavir tablet twice daily for 5 days)    Type 2 diabetes mellitus without complication, without long-term current use of insulin (H)  Risk factor for more significant disease    Acute deep vein thrombosis (DVT) of popliteal vein of right lower extremity (H)  Using Xarelto as a prophylactic, but not a treatment.  We discussed that there is an inherent risk in stopping anticoagulation therapy, especially in light of a viral illness, but we need to stop it if he is going to use paxlovid.  Okay to resume after completion of treatment    Morbid obesity (H)  Risk factor for more significant disease    CKD (chronic kidney disease) stage 1, GFR 90 ml/min or greater  Carries a diagnosis but has normal renal function    Hyperlipidemia LDL goal <100  Hold off on atorvastatin while taking therapy      My first visit with patient and previous history reviewed with him and in his chart     BMI:   Estimated body mass index is 39.74 kg/m  as calculated from the following:    Height as of 7/15/22: 1.854 m (6' 1\").    Weight as of 7/15/22: 136.6 kg (301 lb 3.2 oz).       See Patient Instructions    Return in about 1 week (around 1/10/2023) " for If not improving as expected, [employer] message.    Destinee Pa MD  [facility] JUAN Mo is a 57 year old, presenting for the following health issues:  Covid Concern      HPI       COVID-19 Symptom Review  How many days ago did these symptoms start? 1/2/23  Tested positive 1/3/23  Are any of the following symptoms significant for you?    New or worsening difficulty breathing? No    Worsening cough? Yes, it's a dry cough.     Fever or chills? No    Headache: No    Sore throat: No    Chest pain: No    Diarrhea: No    Body aches? YES    What treatments has patient tried? zicam  Does patient live in a nursing home, group home, or shelter? No  Does patient have a way to get food/medications during quarantined? Yes, I have a friend or family member who can help me.              Visit with patient today to discuss COVID therapy as above.    Review of Systems   Constitutional, HEENT, cardiovascular, pulmonary, gi and gu systems are negative, except as otherwise noted.      Objective    Vitals - Patient Reported  Temperature (Patient Reported): 98.4  F (36.9  C)  Pain Score: No Pain (0)      Vitals:  No vitals were obtained today due to virtual visit.    Physical Exam   healthy, alert and no distress  PSYCH: Alert and oriented times 3; coherent speech, normal   rate and volume, able to articulate logical thoughts, able   to abstract reason, no tangential thoughts, no hallucinations   or delusions  His affect is normal  RESP: No cough, no audible wheezing, able to talk in full sentences  Remainder of exam unable to be completed due to telephone visits    Past labs reviewed with the patient.             Phone call duration: 12 minutes

## 2023-01-18 ENCOUNTER — ANCILLARY PROCEDURE (OUTPATIENT)
Dept: GENERAL RADIOLOGY | Facility: CLINIC | Age: 58
End: 2023-01-18
Attending: PHYSICIAN ASSISTANT
Payer: COMMERCIAL

## 2023-01-18 ENCOUNTER — OFFICE VISIT (OUTPATIENT)
Dept: FAMILY MEDICINE | Facility: CLINIC | Age: 58
End: 2023-01-18
Payer: COMMERCIAL

## 2023-01-18 VITALS
WEIGHT: 306.2 LBS | TEMPERATURE: 99.1 F | HEART RATE: 99 BPM | SYSTOLIC BLOOD PRESSURE: 130 MMHG | HEIGHT: 72 IN | BODY MASS INDEX: 41.47 KG/M2 | OXYGEN SATURATION: 95 % | DIASTOLIC BLOOD PRESSURE: 82 MMHG | RESPIRATION RATE: 20 BRPM

## 2023-01-18 DIAGNOSIS — E11.9 TYPE 2 DIABETES MELLITUS WITHOUT COMPLICATION, WITHOUT LONG-TERM CURRENT USE OF INSULIN (H): ICD-10-CM

## 2023-01-18 DIAGNOSIS — E66.01 MORBID OBESITY (H): ICD-10-CM

## 2023-01-18 DIAGNOSIS — N18.1 CKD (CHRONIC KIDNEY DISEASE) STAGE 1, GFR 90 ML/MIN OR GREATER: ICD-10-CM

## 2023-01-18 DIAGNOSIS — E78.5 HYPERLIPIDEMIA LDL GOAL <100: ICD-10-CM

## 2023-01-18 DIAGNOSIS — Z00.00 ROUTINE GENERAL MEDICAL EXAMINATION AT A HEALTH CARE FACILITY: Primary | ICD-10-CM

## 2023-01-18 DIAGNOSIS — Z12.11 SCREEN FOR COLON CANCER: ICD-10-CM

## 2023-01-18 DIAGNOSIS — I73.9 CLAUDICATION OF CALF MUSCLES (H): ICD-10-CM

## 2023-01-18 LAB
ALBUMIN SERPL BCG-MCNC: 4.8 G/DL (ref 3.5–5.2)
ALP SERPL-CCNC: 72 U/L (ref 40–129)
ALT SERPL W P-5'-P-CCNC: 70 U/L (ref 10–50)
ANION GAP SERPL CALCULATED.3IONS-SCNC: 12 MMOL/L (ref 7–15)
AST SERPL W P-5'-P-CCNC: 35 U/L (ref 10–50)
BILIRUB SERPL-MCNC: 0.2 MG/DL
BUN SERPL-MCNC: 26.4 MG/DL (ref 6–20)
CALCIUM SERPL-MCNC: 9.9 MG/DL (ref 8.6–10)
CHLORIDE SERPL-SCNC: 100 MMOL/L (ref 98–107)
CHOLEST SERPL-MCNC: 172 MG/DL
CREAT SERPL-MCNC: 0.8 MG/DL (ref 0.67–1.17)
DEPRECATED HCO3 PLAS-SCNC: 26 MMOL/L (ref 22–29)
GFR SERPL CREATININE-BSD FRML MDRD: >90 ML/MIN/1.73M2
GLUCOSE SERPL-MCNC: 177 MG/DL (ref 70–99)
HBA1C MFR BLD: 9.1 % (ref 0–5.6)
HDLC SERPL-MCNC: 35 MG/DL
LDLC SERPL CALC-MCNC: ABNORMAL MG/DL
NONHDLC SERPL-MCNC: 137 MG/DL
POTASSIUM SERPL-SCNC: 4.2 MMOL/L (ref 3.4–5.3)
PROT SERPL-MCNC: 8.3 G/DL (ref 6.4–8.3)
SODIUM SERPL-SCNC: 138 MMOL/L (ref 136–145)
TRIGL SERPL-MCNC: 769 MG/DL

## 2023-01-18 PROCEDURE — 90682 RIV4 VACC RECOMBINANT DNA IM: CPT | Performed by: PHYSICIAN ASSISTANT

## 2023-01-18 PROCEDURE — 80061 LIPID PANEL: CPT | Performed by: PHYSICIAN ASSISTANT

## 2023-01-18 PROCEDURE — 83036 HEMOGLOBIN GLYCOSYLATED A1C: CPT | Performed by: PHYSICIAN ASSISTANT

## 2023-01-18 PROCEDURE — 83721 ASSAY OF BLOOD LIPOPROTEIN: CPT | Mod: 59 | Performed by: PHYSICIAN ASSISTANT

## 2023-01-18 PROCEDURE — 90677 PCV20 VACCINE IM: CPT | Performed by: PHYSICIAN ASSISTANT

## 2023-01-18 PROCEDURE — 80053 COMPREHEN METABOLIC PANEL: CPT | Performed by: PHYSICIAN ASSISTANT

## 2023-01-18 PROCEDURE — 99214 OFFICE O/P EST MOD 30 MIN: CPT | Mod: 25 | Performed by: PHYSICIAN ASSISTANT

## 2023-01-18 PROCEDURE — 99396 PREV VISIT EST AGE 40-64: CPT | Mod: 25 | Performed by: PHYSICIAN ASSISTANT

## 2023-01-18 PROCEDURE — 90472 IMMUNIZATION ADMIN EACH ADD: CPT | Performed by: PHYSICIAN ASSISTANT

## 2023-01-18 PROCEDURE — 90471 IMMUNIZATION ADMIN: CPT | Performed by: PHYSICIAN ASSISTANT

## 2023-01-18 PROCEDURE — 72100 X-RAY EXAM L-S SPINE 2/3 VWS: CPT | Mod: TC | Performed by: RADIOLOGY

## 2023-01-18 PROCEDURE — 36415 COLL VENOUS BLD VENIPUNCTURE: CPT | Performed by: PHYSICIAN ASSISTANT

## 2023-01-18 RX ORDER — LISINOPRIL 10 MG/1
10 TABLET ORAL DAILY
Qty: 90 TABLET | Refills: 3 | Status: SHIPPED | OUTPATIENT
Start: 2023-01-18 | End: 2024-01-03

## 2023-01-18 RX ORDER — SEMAGLUTIDE 1.34 MG/ML
1 INJECTION, SOLUTION SUBCUTANEOUS WEEKLY
Qty: 9 ML | Refills: 3 | Status: SHIPPED | OUTPATIENT
Start: 2023-01-18 | End: 2023-01-19

## 2023-01-18 RX ORDER — ATORVASTATIN CALCIUM 20 MG/1
20 TABLET, FILM COATED ORAL DAILY
Qty: 90 TABLET | Refills: 3 | Status: SHIPPED | OUTPATIENT
Start: 2023-01-18 | End: 2024-01-03

## 2023-01-18 ASSESSMENT — ENCOUNTER SYMPTOMS
CHILLS: 0
DIARRHEA: 0
HEARTBURN: 0
ABDOMINAL PAIN: 0
SORE THROAT: 0
WEAKNESS: 1
PALPITATIONS: 0
CONSTIPATION: 0
DIZZINESS: 0
JOINT SWELLING: 1
HEMATOCHEZIA: 0
HEMATURIA: 0
SHORTNESS OF BREATH: 0
PARESTHESIAS: 0
EYE PAIN: 0
MYALGIAS: 1
FEVER: 0
ARTHRALGIAS: 1
DYSURIA: 0
FREQUENCY: 1
NAUSEA: 0
HEADACHES: 0
NERVOUS/ANXIOUS: 0
COUGH: 0

## 2023-01-18 ASSESSMENT — PATIENT HEALTH QUESTIONNAIRE - PHQ9
10. IF YOU CHECKED OFF ANY PROBLEMS, HOW DIFFICULT HAVE THESE PROBLEMS MADE IT FOR YOU TO DO YOUR WORK, TAKE CARE OF THINGS AT HOME, OR GET ALONG WITH OTHER PEOPLE: NOT DIFFICULT AT ALL
SUM OF ALL RESPONSES TO PHQ QUESTIONS 1-9: 8
SUM OF ALL RESPONSES TO PHQ QUESTIONS 1-9: 8

## 2023-01-18 ASSESSMENT — PAIN SCALES - GENERAL: PAINLEVEL: SEVERE PAIN (6)

## 2023-01-18 NOTE — PROGRESS NOTES
SUBJECTIVE:   CC: Chepe is an 57 year old who presents for preventative health visit.     Patient has been advised of split billing requirements and indicates understanding: Yes     Healthy Habits:     Getting at least 3 servings of Calcium per day:  Yes    Bi-annual eye exam:  Yes    Dental care twice a year:  NO    Sleep apnea or symptoms of sleep apnea:  None    Diet:  Diabetic and Other    Frequency of exercise:  None    Taking medications regularly:  Yes    Medication side effects:  None    PHQ-2 Total Score: 3    Additional concerns today:  No    Musculoskeletal problem/pain    Duration: 7 years     Description  Location: Pain in both legs - wake up and is good. Starts hurting around 9 or 10.     Intensity:  Can be severe     Accompanying signs and symptoms: numbness and weakness     History  Previous similar problem: YES  Previous evaluation:  x-ray, ultrasound and MRI    Precipitating or alleviating factors:  Trauma or overuse: YES-overuse   Aggravating factors include: walking and climbing stairs    Therapies tried and outcome: acetaminophen    Diabetes Follow-up    How often are you checking your blood sugar? Not at all    What concerns do you have today about your diabetes? None     Do you have any of these symptoms? (Select all that apply)  No numbness or tingling in feet.  No redness, sores or blisters on feet.  No complaints of excessive thirst.  No reports of blurry vision.  No significant changes to weight.    Hyperlipidemia Follow-Up    Are you regularly taking any medication or supplement to lower your cholesterol?   Yes- Atorvastatin     Are you having muscle aches or other side effects that you think could be caused by your cholesterol lowering medication?  No    Hypertension Follow-up    Do you check your blood pressure regularly outside of the clinic? No     Are you following a low salt diet? Yes    Are your blood pressures ever more than 140 on the top number (systolic) OR more   than 90 on  the bottom number (diastolic), for example 140/90? No    BP Readings from Last 2 Encounters:   23 130/82   07/15/22 136/82     Hemoglobin A1C (%)   Date Value   2023 9.1 (H)   2022 8.1 (H)   2021 9.0 (H)   2020 7.9 (H)     LDL Cholesterol Calculated   Date Value   2023      Comment:     Cannot estimate LDL when triglyceride exceeds 400 mg/dL   2021 37 mg/dL   2020 51 mg/dL   2019 45 mg/dL         Today's PHQ-2 Score:   PHQ-2 (  [employer]) 2023   Q1: Little interest or pleasure in doing things 3   Q2: Feeling down, depressed or hopeless 0   PHQ-2 Score 3   PHQ-2 Total Score (12-17 Years)- Positive if 3 or more points; Administer PHQ-A if positive -   Q1: Little interest or pleasure in doing things Nearly every day   Q2: Feeling down, depressed or hopeless Not at all   PHQ-2 Score 3       Have you ever done Advance Care Planning? (For example, a Health Directive, POLST, or a discussion with a medical provider or your loved ones about your wishes): No, advance care planning information given to patient to review.  Patient plans to discuss their wishes with loved ones or provider.      Social History     Tobacco Use     Smoking status: Former     Packs/day: 1.00     Years: 25.00     Pack years: 25.00     Types: Cigarettes     Quit date: 2010     Years since quittin.9     Smokeless tobacco: Never     Tobacco comments:     started at age 19   Substance Use Topics     Alcohol use: Yes     Comment: rarely     If you drink alcohol do you typically have >3 drinks per day or >7 drinks per week? No    Alcohol Use 2023   Prescreen: >3 drinks/day or >7 drinks/week? No   Prescreen: >3 drinks/day or >7 drinks/week? -   No flowsheet data found.    Last PSA:   PSA   Date Value Ref Range Status   2020 0.71 0 - 4 ug/L Final     Comment:     Assay Method:  Chemiluminescence using Siemens Vista analyzer       Reviewed orders with patient. Reviewed health  maintenance and updated orders accordingly - Yes  Lab work is in process    Reviewed and updated as needed this visit by clinical staff   Tobacco  Allergies  Meds  Problems  Med Hx  Surg Hx  Fam Hx          Reviewed and updated as needed this visit by Provider   Tobacco  Allergies  Meds  Problems  Med Hx  Surg Hx  Fam Hx         Review of Systems   Constitutional: Negative for chills and fever.   HENT: Negative for congestion, ear pain, hearing loss and sore throat.    Eyes: Negative for pain and visual disturbance.   Respiratory: Negative for cough and shortness of breath.    Cardiovascular: Positive for peripheral edema. Negative for chest pain and palpitations.   Gastrointestinal: Negative for abdominal pain, constipation, diarrhea, heartburn, hematochezia and nausea.   Genitourinary: Positive for frequency, impotence and urgency. Negative for dysuria, genital sores, hematuria and penile discharge.   Musculoskeletal: Positive for arthralgias, joint swelling and myalgias.   Skin: Negative for rash.   Neurological: Positive for weakness. Negative for dizziness, headaches and paresthesias.   Psychiatric/Behavioral: Negative for mood changes. The patient is not nervous/anxious.      OBJECTIVE:   /82 (BP Location: Right arm, Patient Position: Sitting, Cuff Size: Adult Large)   Pulse 99   Temp 99.1  F (37.3  C) (Tympanic)   Resp 20   Ht 1.829 m (6')   Wt 138.9 kg (306 lb 3.2 oz)   SpO2 95%   BMI 41.53 kg/m      Physical Exam  GENERAL: healthy, alert and no distress  NECK: no adenopathy, no asymmetry, masses, or scars and thyroid normal to palpation  RESP: lungs clear to auscultation - no rales, rhonchi or wheezes  CV: regular rate and rhythm, normal S1 S2, no S3 or S4, no murmur, click or rub, no peripheral edema and peripheral pulses strong  Back: Non tender L spine or paraspinal muscles. No step offs or other abnormalities.   Neuro: 5/5 strength in hip flexion, knee flexion and extension,  ankle dorsi/plantar flexion. Sensation grossly intact.   MS: no gross musculoskeletal defects noted, no edema    Diagnostic Test Results:  Labs reviewed in Epic    ASSESSMENT/PLAN:   Routine general medical examination at a health care facility  57 yr old here for physical exam. Last physical exam done 1 year ago. Discussed preventative screenings which are updated below. Counseled on immunizations and healthy lifestyle.   Plan: Follow-up in 1 year for repeat physical exam.     Screen for colon cancer  Due for screening. Pt elected for FIT testing.   - Fecal colorectal cancer screen FIT - Future (S+30); Future    Morbid obesity (H)  Body mass index is 41.53 kg/m .. Associated with DM2, CKD, HLD which would improve with weight loss. Encouraged healthy lifestyle changes.     Type 2 diabetes mellitus without complication, without long-term current use of insulin (H)  A1c 9.1. Will increase Ozempic to assist with this. Check Lipids, CMP. Referral to DM education for CGM. Encouraged healthy lifestyle changes. Follow-up in 3 months for recheck.   - Lipid panel reflex to direct LDL Non-fasting; Future  - HEMOGLOBIN A1C; Future  - Comprehensive metabolic panel; Future  - AMB Adult Diabetes Educator Referral; Future  - Comprehensive metabolic panel  - HEMOGLOBIN A1C    CKD (chronic kidney disease) stage 1, GFR 90 ml/min or greater  Based on persistent proteinuria. Already on lisinopril and Jardiance. Continue these for now.   - lisinopril (ZESTRIL) 10 MG tablet; Take 1 tablet (10 mg) by mouth daily    Hyperlipidemia LDL goal <100  Recheck Lipids. Refilled Atorvastatin.   - atorvastatin (LIPITOR) 20 MG tablet; Take 1 tablet (20 mg) by mouth daily    Claudication of calf muscles (H)  Chronic bilateral leg pain that is worse with ambulation and things like climbing stairs. Pain occurs in both legs, mainly the calves. Work up has been negative thus far for a vascular cause with negative ABIs, x-rays/MRI of the knees. Will start  work up for neurologic cause of claudication. Exam was unremarkable today. XR ordered.   - XR Lumbar Spine 2/3 Views; Future    Patient has been advised of split billing requirements and indicates understanding: Yes      COUNSELING:   Reviewed preventive health counseling, as reflected in patient instructions      BMI:   Estimated body mass index is 41.53 kg/m  as calculated from the following:    Height as of this encounter: 1.829 m (6').    Weight as of this encounter: 138.9 kg (306 lb 3.2 oz).   Weight management plan: Discussed healthy diet and exercise guidelines      He reports that he quit smoking about 12 years ago. His smoking use included cigarettes. He has a 25.00 pack-year smoking history. He has never used smokeless tobacco.    Jesus Lewis PA-C  [facility]

## 2023-01-18 NOTE — PATIENT INSTRUCTIONS
Increase Ozempic to 1 mg weekly. New prescription sent.     Updated labs, meds and vaccines today.     X-ray of the low back today to further look at your legs and why you have pain.     Keep an open mind about depression. This is very common with patient who have chronic disease.     The diabetes educator will reach out to you for your glucose monitor.     Preventive Health Recommendations  Male Ages 50 - 64    Yearly exam:             See your health care provider every year in order to  o   Review health changes.   o   Discuss preventive care.    o   Review your medicines if your doctor has prescribed any.   Have a cholesterol test every 5 years, or more frequently if you are at risk for high cholesterol/heart disease.   Have a diabetes test (fasting glucose) every three years. If you are at risk for diabetes, you should have this test more often.   Have a colonoscopy at age 50, or have a yearly FIT test (stool test). These exams will check for colon cancer.    Talk with your health care provider about whether or not a prostate cancer screening test (PSA) is right for you.  You should be tested each year for STDs (sexually transmitted diseases), if you re at risk.     Shots: Get a flu shot each year. Get a tetanus shot every 10 years.     Nutrition:  Eat at least 5 servings of fruits and vegetables daily.   Eat whole-grain bread, whole-wheat pasta and brown rice instead of white grains and rice.   Get adequate Calcium and Vitamin D.     Lifestyle  Exercise for at least 150 minutes a week (30 minutes a day, 5 days a week). This will help you control your weight and prevent disease.   Limit alcohol to one drink per day.   No smoking.   Wear sunscreen to prevent skin cancer.   See your dentist every six months for an exam and cleaning.   See your eye doctor every 1 to 2 years.

## 2023-01-19 ENCOUNTER — TELEPHONE (OUTPATIENT)
Dept: FAMILY MEDICINE | Facility: CLINIC | Age: 58
End: 2023-01-19
Payer: COMMERCIAL

## 2023-01-19 DIAGNOSIS — E11.9 TYPE 2 DIABETES MELLITUS WITHOUT COMPLICATION, WITHOUT LONG-TERM CURRENT USE OF INSULIN (H): ICD-10-CM

## 2023-01-19 LAB — LDLC SERPL DIRECT ASSAY-MCNC: 62 MG/DL

## 2023-01-19 RX ORDER — SEMAGLUTIDE 1.34 MG/ML
1 INJECTION, SOLUTION SUBCUTANEOUS WEEKLY
Qty: 3 ML | Refills: 11 | Status: SHIPPED | OUTPATIENT
Start: 2023-01-19 | End: 2023-04-17 | Stop reason: DRUGHIGH

## 2023-01-19 NOTE — TELEPHONE ENCOUNTER
Updated prescription which will be covered after discussion with PharmLIAM Naranjo.     Jesus Lewis PA-C

## 2023-01-19 NOTE — TELEPHONE ENCOUNTER
PRIOR AUTHORIZATION DENIED    Medication: Semaglutide, 1 MG/DOSE, (OZEMPIC, 1 MG/DOSE,) 4 MG/3ML SOPN - EPA DENIED    Denial Date: 1/19/2023    Denial Rational:        Appeal Information:

## 2023-01-20 ENCOUNTER — TELEPHONE (OUTPATIENT)
Dept: FAMILY MEDICINE | Facility: CLINIC | Age: 58
End: 2023-01-20
Payer: COMMERCIAL

## 2023-01-20 DIAGNOSIS — E11.9 TYPE 2 DIABETES MELLITUS WITHOUT COMPLICATION, WITHOUT LONG-TERM CURRENT USE OF INSULIN (H): Primary | ICD-10-CM

## 2023-01-20 NOTE — TELEPHONE ENCOUNTER
Diabetes Education Scheduling Outreach #1:    Call to patient to schedule. Patient is interested in personal CGM, but wants to know if it is covered by insurance first. He does not want to waste his or anyone else's time without knowing if the CGM is covered or not. Advised patient he would have to call his  insurance. He stated that he is not going to call his insurance and will do without checking his blood sugars. He would prefer that a prescription for a CGM be sent to his pharmacy to see if it is covered first.     Jill Nuñez OnCall  Diabetes and Nutrition Scheduling

## 2023-01-21 PROCEDURE — 82274 ASSAY TEST FOR BLOOD FECAL: CPT | Performed by: PHYSICIAN ASSISTANT

## 2023-01-23 RX ORDER — FLASH GLUCOSE SENSOR
KIT MISCELLANEOUS
Qty: 2 EACH | Refills: 11 | Status: SHIPPED | OUTPATIENT
Start: 2023-01-23 | End: 2023-12-18

## 2023-01-23 NOTE — TELEPHONE ENCOUNTER
Please call patient and let him know that I had the pharmacy run a test claim for continue glucose monitors. The freestyle Alexx 14 day sensor is covered under his insurance. I sent a prescription to the [place] pharmacy for him.     He needs to schedule an appt with DM education on how to use it and set it up.    Jesus Lewis PA-C

## 2023-01-26 LAB — HEMOCCULT STL QL IA: NEGATIVE

## 2023-04-01 ENCOUNTER — TRANSFERRED RECORDS (OUTPATIENT)
Dept: MULTI SPECIALTY CLINIC | Facility: CLINIC | Age: 58
End: 2023-04-01

## 2023-04-01 LAB — RETINOPATHY: NORMAL

## 2023-04-17 ENCOUNTER — OFFICE VISIT (OUTPATIENT)
Dept: FAMILY MEDICINE | Facility: CLINIC | Age: 58
End: 2023-04-17
Payer: COMMERCIAL

## 2023-04-17 VITALS
SYSTOLIC BLOOD PRESSURE: 128 MMHG | RESPIRATION RATE: 18 BRPM | HEIGHT: 72 IN | HEART RATE: 87 BPM | OXYGEN SATURATION: 95 % | DIASTOLIC BLOOD PRESSURE: 74 MMHG | BODY MASS INDEX: 40.69 KG/M2 | WEIGHT: 300.4 LBS | TEMPERATURE: 98.1 F

## 2023-04-17 DIAGNOSIS — I82.431 ACUTE DEEP VEIN THROMBOSIS (DVT) OF POPLITEAL VEIN OF RIGHT LOWER EXTREMITY (H): ICD-10-CM

## 2023-04-17 DIAGNOSIS — E11.9 TYPE 2 DIABETES MELLITUS WITHOUT COMPLICATION, WITHOUT LONG-TERM CURRENT USE OF INSULIN (H): Primary | ICD-10-CM

## 2023-04-17 LAB
CREAT UR-MCNC: 69.5 MG/DL
HBA1C MFR BLD: 7.5 % (ref 0–5.6)
MICROALBUMIN UR-MCNC: <12 MG/L
MICROALBUMIN/CREAT UR: NORMAL MG/G{CREAT}

## 2023-04-17 PROCEDURE — 99214 OFFICE O/P EST MOD 30 MIN: CPT | Performed by: PHYSICIAN ASSISTANT

## 2023-04-17 PROCEDURE — 36415 COLL VENOUS BLD VENIPUNCTURE: CPT | Performed by: PHYSICIAN ASSISTANT

## 2023-04-17 PROCEDURE — 82043 UR ALBUMIN QUANTITATIVE: CPT | Performed by: PHYSICIAN ASSISTANT

## 2023-04-17 PROCEDURE — 83036 HEMOGLOBIN GLYCOSYLATED A1C: CPT | Performed by: PHYSICIAN ASSISTANT

## 2023-04-17 PROCEDURE — 82570 ASSAY OF URINE CREATININE: CPT | Performed by: PHYSICIAN ASSISTANT

## 2023-04-17 ASSESSMENT — PAIN SCALES - GENERAL: PAINLEVEL: SEVERE PAIN (6)

## 2023-04-17 NOTE — PROGRESS NOTES
Assessment & Plan   Type 2 diabetes mellitus without complication, without long-term current use of insulin (H)  A1c is now at goal. Down 6 lbs as well. Will increase Ozempic to max dose and follow-up in 3 months. Al/Cr ratio pended.   - Hemoglobin A1c; Future  - Albumin Random Urine Quantitative with Creat Ratio; Future  - Semaglutide, 2 MG/DOSE, (OZEMPIC) 8 MG/3ML pen; Inject 2 mg Subcutaneous every 7 days    Acute deep vein thrombosis (DVT) of popliteal vein of right lower extremity (H)  Due for refill today. Stable without symptoms.   - rivaroxaban ANTICOAGULANT (XARELTO ANTICOAGULANT) 20 MG TABS tablet; Take 1 tablet (20 mg) by mouth daily (with dinner)      Jesus Lewis PA-C  [facility]   Chepe is a 57 year old, presenting for the following health issues:  Diabetes        4/17/2023    11:19 AM   Additional Questions   Roomed by Denise GUEVARA CMA     History of Present Illness       Diabetes:   He presents for follow up of diabetes.  He is checking home blood glucose with a continuous glucose monitor.  He checks blood glucose before and after meals.  Blood glucose is never over 200 and never under 70. When his blood glucose is low, the patient is asymptomatic for confusion, blurred vision, lethargy and reports not feeling dizzy, shaky, or weak.  He has no concerns regarding his diabetes at this time.  He is not experiencing numbness or burning in feet, excessive thirst, blurry vision, weight changes or redness, sores or blisters on feet.         He eats 2-3 servings of fruits and vegetables daily.He consumes 0 sweetened beverage(s) daily.He exercises with enough effort to increase his heart rate 9 or less minutes per day.  He exercises with enough effort to increase his heart rate 3 or less days per week.   He is taking medications regularly.     Review of Systems   See HPI       Objective    /74 (BP Location: Left arm, Patient Position: Sitting, Cuff Size: Adult  Large)   Pulse 87   Temp 98.1  F (36.7  C) (Tympanic)   Resp 18   Ht 1.829 m (6')   Wt 136.3 kg (300 lb 6.4 oz)   SpO2 95%   BMI 40.74 kg/m    Body mass index is 40.74 kg/m .  Physical Exam   Constitutional: healthy, alert, and no distress  Head: Normocephalic. Atraumatic  Eyes: No conjunctival injection, sclera anicteric  Respiratory: No resp distress.  Musculoskeletal: extremities normal- no gross deformities noted, and normal muscle tone  Neurologic: Gait normal. CN 2-12 grossly intact  Psychiatric: mentation appears normal and affect normal/bright

## 2023-04-18 ENCOUNTER — TELEPHONE (OUTPATIENT)
Dept: FAMILY MEDICINE | Facility: CLINIC | Age: 58
End: 2023-04-18

## 2023-04-18 NOTE — TELEPHONE ENCOUNTER
Prior Authorization Approval    Authorization Effective Date: 04/17/2023   Authorization Expiration Date:  04/17/2024   Medication: Semaglutide, 2 MG/DOSE, (OZEMPIC) 8 MG/3ML pen - med is approved  Approved Dose/Quantity: 3ml per 28 days  Insurance Company: [employer] [place] - Phone [phone] Fax [phone]  Expected CoPay:  $3.00     Which Pharmacy is filling the prescription (Not needed for infusion/clinic administered): [facility] [place], [place] - [address] [place] [address]  Pharmacy Notified: Yes - paid claim in ERX -   Patient Notified: Yes Pharmacy will notify patient once order is ready.   DENIED status in EPA due to qty vs DS on RX - can fill 3ml not 9ml at a time

## 2023-04-18 NOTE — TELEPHONE ENCOUNTER
Central Prior Authorization Team   Phone: [phone]      EPA DENIED: WAITING ON DENIAL LETTER

## 2023-07-03 DIAGNOSIS — E11.9 TYPE 2 DIABETES MELLITUS WITHOUT COMPLICATION, WITHOUT LONG-TERM CURRENT USE OF INSULIN (H): ICD-10-CM

## 2023-07-28 NOTE — ADDENDUM NOTE
Addended by: ELMER LARA on: 5/2/2019 10:34 AM     Modules accepted: Orders     There are no Wet Read(s) to document.

## 2023-09-08 ENCOUNTER — OFFICE VISIT (OUTPATIENT)
Dept: FAMILY MEDICINE | Facility: CLINIC | Age: 58
End: 2023-09-08
Attending: PHYSICIAN ASSISTANT
Payer: COMMERCIAL

## 2023-09-08 VITALS
DIASTOLIC BLOOD PRESSURE: 78 MMHG | TEMPERATURE: 98.2 F | HEIGHT: 72 IN | SYSTOLIC BLOOD PRESSURE: 130 MMHG | HEART RATE: 98 BPM | BODY MASS INDEX: 40.9 KG/M2 | OXYGEN SATURATION: 93 % | WEIGHT: 302 LBS | RESPIRATION RATE: 18 BRPM

## 2023-09-08 DIAGNOSIS — E11.9 TYPE 2 DIABETES MELLITUS WITHOUT COMPLICATION, WITHOUT LONG-TERM CURRENT USE OF INSULIN (H): Primary | ICD-10-CM

## 2023-09-08 DIAGNOSIS — B35.3 TINEA PEDIS OF BOTH FEET: ICD-10-CM

## 2023-09-08 LAB
ALBUMIN SERPL BCG-MCNC: 4.9 G/DL (ref 3.5–5.2)
ALP SERPL-CCNC: 65 U/L (ref 40–129)
ALT SERPL W P-5'-P-CCNC: 74 U/L (ref 0–70)
ANION GAP SERPL CALCULATED.3IONS-SCNC: 12 MMOL/L (ref 7–15)
AST SERPL W P-5'-P-CCNC: 39 U/L (ref 0–45)
BILIRUB SERPL-MCNC: 0.3 MG/DL
BUN SERPL-MCNC: 16 MG/DL (ref 6–20)
CALCIUM SERPL-MCNC: 9.8 MG/DL (ref 8.6–10)
CHLORIDE SERPL-SCNC: 99 MMOL/L (ref 98–107)
CREAT SERPL-MCNC: 0.74 MG/DL (ref 0.67–1.17)
DEPRECATED HCO3 PLAS-SCNC: 27 MMOL/L (ref 22–29)
EGFRCR SERPLBLD CKD-EPI 2021: >90 ML/MIN/1.73M2
GLUCOSE SERPL-MCNC: 110 MG/DL (ref 70–99)
HBA1C MFR BLD: 7.4 % (ref 0–5.6)
POTASSIUM SERPL-SCNC: 4.2 MMOL/L (ref 3.4–5.3)
PROT SERPL-MCNC: 7.9 G/DL (ref 6.4–8.3)
SODIUM SERPL-SCNC: 138 MMOL/L (ref 136–145)

## 2023-09-08 PROCEDURE — 80053 COMPREHEN METABOLIC PANEL: CPT | Performed by: PHYSICIAN ASSISTANT

## 2023-09-08 PROCEDURE — 36415 COLL VENOUS BLD VENIPUNCTURE: CPT | Performed by: PHYSICIAN ASSISTANT

## 2023-09-08 PROCEDURE — 99214 OFFICE O/P EST MOD 30 MIN: CPT | Performed by: PHYSICIAN ASSISTANT

## 2023-09-08 PROCEDURE — 83036 HEMOGLOBIN GLYCOSYLATED A1C: CPT | Performed by: PHYSICIAN ASSISTANT

## 2023-09-08 ASSESSMENT — PAIN SCALES - GENERAL: PAINLEVEL: NO PAIN (0)

## 2023-09-08 NOTE — PROGRESS NOTES
Assessment & Plan   Type 2 diabetes mellitus without complication, without long-term current use of insulin (H)  Tolerating increased dose of Ozempic well. BS have been controlled, but still has not had any weight loss, which is unfortunate. Foot exam with significant tinea pedis and recommended OTC antifungal creams. Follow-up in 3-6 months based on A1c and adjust medicine as indicated based on lab work.   - Hemoglobin A1c; Future  - Comprehensive metabolic panel; Future  - empagliflozin (JARDIANCE) 25 MG TABS tablet; Take 1 tablet (25 mg) by mouth daily    Tinea pedis of both feet  Significant between most toes. Not treated in the past. Recommend OTC antifungal creams. If not improvement, would recommend oral terbinafine.     BMI:   Estimated body mass index is 40.96 kg/m  as calculated from the following:    Height as of this encounter: 1.829 m (6').    Weight as of this encounter: 137 kg (302 lb).     Jesus Lewis PA-C  [facility]    Page Mo is a 58 year old, presenting for the following health issues:  Diabetes        9/8/2023     3:00 PM   Additional Questions   Roomed by Denise GUEVARA CMA       History of Present Illness       Diabetes:   He presents for follow up of diabetes.   He is checking home blood glucose with a continuous glucose monitor.   He checks blood glucose before and after meals.  Blood glucose is sometimes over 200 and never under 70. He is aware of hypoglycemia symptoms including none.    He has no concerns regarding his diabetes at this time.   He is not experiencing numbness or burning in feet, excessive thirst, blurry vision, weight changes or redness, sores or blisters on feet. The patient has had a diabetic eye exam in the last 12 months. Eye exam performed on april. Location of last eye exam eye doctor.         Review of Systems   See HPI       Objective    /78 (BP Location: Right arm, Patient Position: Sitting, Cuff Size: Adult Large)    Pulse 98   Temp 98.2  F (36.8  C) (Tympanic)   Resp 18   Ht 1.829 m (6')   Wt 137 kg (302 lb)   SpO2 93%   BMI 40.96 kg/m    Body mass index is 40.96 kg/m .  Physical Exam   GENERAL: healthy, alert and no distress  NECK: no adenopathy, no asymmetry, masses, or scars and thyroid normal to palpation  RESP: lungs clear to auscultation - no rales, rhonchi or wheezes  CV: regular rate and rhythm, normal S1 S2, no S3 or S4, no murmur, click or rub, no peripheral edema and peripheral pulses strong  MS: no gross musculoskeletal defects noted, no edema  Diabetic foot exam: no trophic changes or ulcerative lesions, normal sensory exam, DP reduced bilateral, and tinea pedis

## 2023-09-09 NOTE — PATIENT INSTRUCTIONS
Continue current medicines.      athletes foot cream/spray from your local pharmacy. Use for at least 2 weeks straight, but longer if needed. If not helpful, then we can try other medicine.     Please schedule an eye exam.

## 2023-10-01 DIAGNOSIS — E11.9 TYPE 2 DIABETES MELLITUS WITHOUT COMPLICATION, WITHOUT LONG-TERM CURRENT USE OF INSULIN (H): ICD-10-CM

## 2023-12-17 DIAGNOSIS — E11.9 TYPE 2 DIABETES MELLITUS WITHOUT COMPLICATION, WITHOUT LONG-TERM CURRENT USE OF INSULIN (H): ICD-10-CM

## 2023-12-18 RX ORDER — FLASH GLUCOSE SENSOR
KIT MISCELLANEOUS
Qty: 2 EACH | Refills: 11 | Status: SHIPPED | OUTPATIENT
Start: 2023-12-18

## 2024-01-01 NOTE — PATIENT INSTRUCTIONS
ASSESSMENT/PLAN:                                                    Lifestyle recommendations:regular exercise, at least 150 minutes per week (average 30 minutes 5 times a week)  good job on losing weight!  keep working on losing weight (ideal BMI-body mass index is <25)  Diabetic recommendations:-return for follow-up visit in 3 month(s)    (E11.9) Type 2 diabetes mellitus without complication, without long-term current use of insulin (H)  (primary encounter diagnosis)  Comment: Hgb A1C is borderline.   Plan: BASIC METABOLIC PANEL, Albumin Random Urine         Quantitative with Creat Ratio, HEMOGLOBIN A1C,         metFORMIN (GLUCOPHAGE) 1000 MG tablet, FOOT         EXAM, semaglutide (OZEMPIC) 2 MG/1.5ML SOPN pen          Stop the liraglutide-Victoza.  Take semaglutide (Ozempic) instead if insurance covers.    Start with injection once weekly 0.25mg for 4 weeks, then 0.5mg once weekly.    If glucometers are often high, we can increase the dose further.  This medication often helps for weight.     (E66.01) Morbid obesity (H)  Comment: working on weight.     (N18.1) CKD (chronic kidney disease) stage 1, GFR 90 ml/min or greater  Comment: consequence of diabetes mellitus.   Plan: Good control of diabetes mellitus, blood pressure <140/90 and the ACE/ARB category drugs like lisinopril can all help prevent further kidney damage.     (E78.5) Hyperlipidemia LDL goal <100  Comment: due for follow-up   Plan: Non-fasting blood tests today.     (M79.604,  M79.605) Pain in both lower extremities  Comment: R/O arterioal insufficiency as cause of pain.  Could also be related to varicose veins.   Plan: Vascular Surgery Referral        Schedule an appointment with vascular specialists.     (R20.0) Right arm numbness  Comment: uncertain cause  Plan: Pay attention to what triggers the numbness.        Diabetes complications/Comorbid conditions:  Nephropathy:ckd    Retinopathy: no  Neuropathy: no  Vascular disease: ?  High/low problems:  no  Tobacco use: no    Treatments:  Statin: atorvastatin   Exercise: some weights.   ACE: lisinopril   ASA: no on rivaroxaban (Xarelto)    2024/negative

## 2024-01-02 ENCOUNTER — IMMUNIZATION (OUTPATIENT)
Dept: FAMILY MEDICINE | Facility: CLINIC | Age: 59
End: 2024-01-02
Payer: COMMERCIAL

## 2024-01-02 DIAGNOSIS — N18.1 CKD (CHRONIC KIDNEY DISEASE) STAGE 1, GFR 90 ML/MIN OR GREATER: ICD-10-CM

## 2024-01-02 DIAGNOSIS — E11.9 TYPE 2 DIABETES MELLITUS WITHOUT COMPLICATION, WITHOUT LONG-TERM CURRENT USE OF INSULIN (H): ICD-10-CM

## 2024-01-02 DIAGNOSIS — E78.5 HYPERLIPIDEMIA LDL GOAL <100: ICD-10-CM

## 2024-01-02 PROCEDURE — 91320 SARSCV2 VAC 30MCG TRS-SUC IM: CPT

## 2024-01-02 PROCEDURE — 90471 IMMUNIZATION ADMIN: CPT

## 2024-01-02 PROCEDURE — 90686 IIV4 VACC NO PRSV 0.5 ML IM: CPT

## 2024-01-02 PROCEDURE — 90480 ADMN SARSCOV2 VAC 1/ONLY CMP: CPT

## 2024-01-03 RX ORDER — LISINOPRIL 10 MG/1
10 TABLET ORAL DAILY
Qty: 90 TABLET | Refills: 1 | Status: SHIPPED | OUTPATIENT
Start: 2024-01-03 | End: 2024-06-03

## 2024-01-03 RX ORDER — ATORVASTATIN CALCIUM 20 MG/1
20 TABLET, FILM COATED ORAL DAILY
Qty: 90 TABLET | Refills: 1 | Status: SHIPPED | OUTPATIENT
Start: 2024-01-03 | End: 2024-06-03

## 2024-01-04 ENCOUNTER — PATIENT OUTREACH (OUTPATIENT)
Dept: CARE COORDINATION | Facility: CLINIC | Age: 59
End: 2024-01-04
Payer: COMMERCIAL

## 2024-01-18 ENCOUNTER — PATIENT OUTREACH (OUTPATIENT)
Dept: CARE COORDINATION | Facility: CLINIC | Age: 59
End: 2024-01-18
Payer: COMMERCIAL

## 2024-02-01 ENCOUNTER — MYC REFILL (OUTPATIENT)
Dept: FAMILY MEDICINE | Facility: CLINIC | Age: 59
End: 2024-02-01
Payer: COMMERCIAL

## 2024-02-01 DIAGNOSIS — E11.9 TYPE 2 DIABETES MELLITUS WITHOUT COMPLICATION, WITHOUT LONG-TERM CURRENT USE OF INSULIN (H): ICD-10-CM

## 2024-03-17 DIAGNOSIS — E11.9 TYPE 2 DIABETES MELLITUS WITHOUT COMPLICATION, WITHOUT LONG-TERM CURRENT USE OF INSULIN (H): ICD-10-CM

## 2024-03-18 RX ORDER — SEMAGLUTIDE 2.68 MG/ML
INJECTION, SOLUTION SUBCUTANEOUS
Qty: 9 ML | Refills: 0 | Status: SHIPPED | OUTPATIENT
Start: 2024-03-18 | End: 2024-06-03

## 2024-04-29 DIAGNOSIS — I82.431 ACUTE DEEP VEIN THROMBOSIS (DVT) OF POPLITEAL VEIN OF RIGHT LOWER EXTREMITY (H): ICD-10-CM

## 2024-04-29 RX ORDER — RIVAROXABAN 20 MG/1
20 TABLET, FILM COATED ORAL
Qty: 30 TABLET | Refills: 0 | Status: SHIPPED | OUTPATIENT
Start: 2024-04-29 | End: 2024-06-03

## 2024-06-03 ENCOUNTER — OFFICE VISIT (OUTPATIENT)
Dept: FAMILY MEDICINE | Facility: CLINIC | Age: 59
End: 2024-06-03
Payer: COMMERCIAL

## 2024-06-03 VITALS
HEART RATE: 89 BPM | OXYGEN SATURATION: 96 % | RESPIRATION RATE: 14 BRPM | WEIGHT: 295 LBS | SYSTOLIC BLOOD PRESSURE: 126 MMHG | DIASTOLIC BLOOD PRESSURE: 78 MMHG | BODY MASS INDEX: 40.01 KG/M2 | TEMPERATURE: 98 F

## 2024-06-03 DIAGNOSIS — N18.1 CKD (CHRONIC KIDNEY DISEASE) STAGE 1, GFR 90 ML/MIN OR GREATER: ICD-10-CM

## 2024-06-03 DIAGNOSIS — Z11.4 SCREENING FOR HIV (HUMAN IMMUNODEFICIENCY VIRUS): ICD-10-CM

## 2024-06-03 DIAGNOSIS — E66.01 MORBID OBESITY (H): ICD-10-CM

## 2024-06-03 DIAGNOSIS — B35.3 TINEA PEDIS OF BOTH FEET: ICD-10-CM

## 2024-06-03 DIAGNOSIS — E11.9 TYPE 2 DIABETES MELLITUS WITHOUT COMPLICATION, WITHOUT LONG-TERM CURRENT USE OF INSULIN (H): Primary | ICD-10-CM

## 2024-06-03 DIAGNOSIS — E78.5 HYPERLIPIDEMIA LDL GOAL <100: ICD-10-CM

## 2024-06-03 DIAGNOSIS — Z86.718 HISTORY OF DEEP VENOUS THROMBOSIS: ICD-10-CM

## 2024-06-03 DIAGNOSIS — Z12.11 SCREEN FOR COLON CANCER: ICD-10-CM

## 2024-06-03 PROBLEM — I82.431 ACUTE DEEP VEIN THROMBOSIS (DVT) OF POPLITEAL VEIN OF RIGHT LOWER EXTREMITY (H): Status: RESOLVED | Noted: 2018-03-28 | Resolved: 2024-06-03

## 2024-06-03 LAB
ALBUMIN SERPL BCG-MCNC: 4.8 G/DL (ref 3.5–5.2)
ALP SERPL-CCNC: 62 U/L (ref 40–150)
ALT SERPL W P-5'-P-CCNC: 74 U/L (ref 0–70)
ANION GAP SERPL CALCULATED.3IONS-SCNC: 16 MMOL/L (ref 7–15)
AST SERPL W P-5'-P-CCNC: 43 U/L (ref 0–45)
BILIRUB SERPL-MCNC: 0.4 MG/DL
BUN SERPL-MCNC: 15.8 MG/DL (ref 6–20)
CALCIUM SERPL-MCNC: 9.5 MG/DL (ref 8.6–10)
CHLORIDE SERPL-SCNC: 99 MMOL/L (ref 98–107)
CHOLEST SERPL-MCNC: 131 MG/DL
CREAT SERPL-MCNC: 0.71 MG/DL (ref 0.67–1.17)
CREAT UR-MCNC: 124.6 MG/DL
DEPRECATED HCO3 PLAS-SCNC: 20 MMOL/L (ref 22–29)
EGFRCR SERPLBLD CKD-EPI 2021: >90 ML/MIN/1.73M2
FASTING STATUS PATIENT QL REPORTED: NO
FASTING STATUS PATIENT QL REPORTED: NO
GLUCOSE SERPL-MCNC: 101 MG/DL (ref 70–99)
HBA1C MFR BLD: 7.6 % (ref 0–5.6)
HDLC SERPL-MCNC: 34 MG/DL
LDLC SERPL CALC-MCNC: 43 MG/DL
MICROALBUMIN UR-MCNC: 14.5 MG/L
MICROALBUMIN/CREAT UR: 11.64 MG/G CR (ref 0–17)
NONHDLC SERPL-MCNC: 97 MG/DL
POTASSIUM SERPL-SCNC: 4.1 MMOL/L (ref 3.4–5.3)
PROT SERPL-MCNC: 7.8 G/DL (ref 6.4–8.3)
SODIUM SERPL-SCNC: 135 MMOL/L (ref 135–145)
TRIGL SERPL-MCNC: 270 MG/DL

## 2024-06-03 PROCEDURE — 87389 HIV-1 AG W/HIV-1&-2 AB AG IA: CPT | Performed by: PHYSICIAN ASSISTANT

## 2024-06-03 PROCEDURE — 99214 OFFICE O/P EST MOD 30 MIN: CPT | Performed by: PHYSICIAN ASSISTANT

## 2024-06-03 PROCEDURE — 80053 COMPREHEN METABOLIC PANEL: CPT | Performed by: PHYSICIAN ASSISTANT

## 2024-06-03 PROCEDURE — 83036 HEMOGLOBIN GLYCOSYLATED A1C: CPT | Performed by: PHYSICIAN ASSISTANT

## 2024-06-03 PROCEDURE — 82043 UR ALBUMIN QUANTITATIVE: CPT | Performed by: PHYSICIAN ASSISTANT

## 2024-06-03 PROCEDURE — 82570 ASSAY OF URINE CREATININE: CPT | Performed by: PHYSICIAN ASSISTANT

## 2024-06-03 PROCEDURE — G2211 COMPLEX E/M VISIT ADD ON: HCPCS | Performed by: PHYSICIAN ASSISTANT

## 2024-06-03 PROCEDURE — 99207 E-CONSULT TO DERMATOLOGY (ADULT OUTPT PROVIDER TO SPECIALIST WRITTEN QUESTION & RESPONSE): CPT | Performed by: PHYSICIAN ASSISTANT

## 2024-06-03 PROCEDURE — 36415 COLL VENOUS BLD VENIPUNCTURE: CPT | Performed by: PHYSICIAN ASSISTANT

## 2024-06-03 PROCEDURE — 80061 LIPID PANEL: CPT | Performed by: PHYSICIAN ASSISTANT

## 2024-06-03 RX ORDER — LISINOPRIL 10 MG/1
10 TABLET ORAL DAILY
Qty: 90 TABLET | Refills: 3 | Status: SHIPPED | OUTPATIENT
Start: 2024-06-03

## 2024-06-03 RX ORDER — ATORVASTATIN CALCIUM 20 MG/1
20 TABLET, FILM COATED ORAL DAILY
Qty: 90 TABLET | Refills: 3 | Status: SHIPPED | OUTPATIENT
Start: 2024-06-03

## 2024-06-03 RX ORDER — SEMAGLUTIDE 2.68 MG/ML
2 INJECTION, SOLUTION SUBCUTANEOUS
Qty: 9 ML | Refills: 3 | Status: SHIPPED | OUTPATIENT
Start: 2024-06-03

## 2024-06-03 ASSESSMENT — PAIN SCALES - GENERAL: PAINLEVEL: NO PAIN (0)

## 2024-06-03 NOTE — NURSING NOTE
Chief Complaint   Patient presents with    Diabetes     /78   Pulse 89   Temp 98  F (36.7  C) (Tympanic)   Resp 14   Wt 133.8 kg (295 lb)   SpO2 96%   BMI 40.01 kg/m   Estimated body mass index is 40.01 kg/m  as calculated from the following:    Height as of 9/8/23: 1.829 m (6').    Weight as of this encounter: 133.8 kg (295 lb).  Patient presents to the clinic using No DME      Health Maintenance that is potentially due pending provider review:    Health Maintenance Due   Topic Date Due    HIV SCREENING  Never done    HEPATITIS B IMMUNIZATION (1 of 3 - 19+ 3-dose series) Never done    ZOSTER IMMUNIZATION (1 of 2) Never done    LUNG CANCER SCREENING  Never done    YEARLY PREVENTIVE VISIT  01/18/2024    LIPID  01/18/2024    COLORECTAL CANCER SCREENING  01/21/2024    A1C  03/08/2024    EYE EXAM  04/01/2024    MICROALBUMIN  04/17/2024

## 2024-06-03 NOTE — PROGRESS NOTES
Assessment & Plan   Type 2 diabetes mellitus without complication, without long-term current use of insulin (H)  Here for recheck. A1c is stable at 7.6%. Foot exam done, see below. We will continue his current regimen and follow-up in 6 months for recheck.   - Lipid panel reflex to direct LDL Non-fasting; Future  - HEMOGLOBIN A1C; Future  - metFORMIN (GLUCOPHAGE) 1000 MG tablet; TAKE ONE TABLET BY MOUTH TWICE A DAY WITH MEALS.  - Semaglutide, 2 MG/DOSE, (OZEMPIC, 2 MG/DOSE,) 8 MG/3ML pen; Inject 2 mg Subcutaneous every 7 days  - Comprehensive metabolic panel (BMP + Alb, Alk Phos, ALT, AST, Total. Bili, TP); Future  - empagliflozin (JARDIANCE) 25 MG TABS tablet; Take 1 tablet (25 mg) by mouth daily    History of deep venous thrombosis  No recurrence recently. Continue Xarelto.   - rivaroxaban ANTICOAGULANT (XARELTO ANTICOAGULANT) 20 MG TABS tablet; Take 1 tablet (20 mg) by mouth daily (with dinner)    Hyperlipidemia LDL goal <100  Due for recheck and refill.   - atorvastatin (LIPITOR) 20 MG tablet; Take 1 tablet (20 mg) by mouth daily  - Comprehensive metabolic panel (BMP + Alb, Alk Phos, ALT, AST, Total. Bili, TP); Future    CKD (chronic kidney disease) stage 1, GFR 90 ml/min or greater  Due for recheck. Suspect this is related to his diabetes. Continue lisinopril.   - Albumin Random Urine Quantitative with Creat Ratio; Future  - lisinopril (ZESTRIL) 10 MG tablet; Take 1 tablet (10 mg) by mouth daily  - Comprehensive metabolic panel (BMP + Alb, Alk Phos, ALT, AST, Total. Bili, TP); Future    Tinea pedis of both feet  Has failed several OTC topicals. Will recheck LFTs today and likely start oral Terbinafine. Pt also okay with E consult to Derm to see if something like chronic ciclopirox would be beneficial, or other recommendations they have.   - Adult E-Consult to Dermatology (Outpt Provider to Specialist Written Question & Response)    Morbid obesity (H)  Body mass index is 40.01 kg/m .. Associated with DM2, HLD,  DVT which would improve with weight loss. Encouraged healthy lifestyle changes.     Screening for HIV (human immunodeficiency virus)  Due for screening.   - HIV Antigen Antibody Combo; Future    Screen for colon cancer  Due for screening.   - Fecal colorectal cancer screen FIT - Future (S+30); Future    The longitudinal plan of care for the diagnosis(es)/condition(s) as documented were addressed during this visit. Due to the added complexity in care, I will continue to support Chepe in the subsequent management and with ongoing continuity of care.     BMI  Estimated body mass index is 40.01 kg/m  as calculated from the following:    Height as of 9/8/23: 1.829 m (6').    Weight as of this encounter: 133.8 kg (295 lb).     Subjective   Chepe is a 58 year old, presenting for the following health issues:  Diabetes      6/3/2024     4:05 PM   Additional Questions   Roomed by Shyanne HARO   Accompanied by Self         6/3/2024     4:05 PM   Patient Reported Additional Medications   Patient reports taking the following new medications .     History of Present Illness       Reason for visit:  Diabetic test    He eats 0-1 servings of fruits and vegetables daily.He consumes 0 sweetened beverage(s) daily.He exercises with enough effort to increase his heart rate 10 to 19 minutes per day.  He exercises with enough effort to increase his heart rate 4 days per week.   He is taking medications regularly.         Diabetes Follow-up    How often are you checking your blood sugar? Continuous glucose monitor  What time of day are you checking your blood sugars (select all that apply)?  Before and after meals  Have you had any blood sugars above 200?  Yes - couple times  Have you had any blood sugars below 70?  No  What symptoms do you notice when your blood sugar is low?  None  What concerns do you have today about your diabetes? None   Do you have any of these symptoms? (Select all that apply)  No numbness or tingling in feet.  No  redness, sores or blisters on feet.  No complaints of excessive thirst.  No reports of blurry vision.  No significant changes to weight. Pain in legs and feet  Have you had a diabetic eye exam in the last 12 months? No    Patient has Diabetes Type 1 or 2.    Patient is on Continuous Personal CGM and is treated with 4 injections daily or continuous subcutaneous insulin infusion pump.  Pt requires frequent adjustments to their insulin treatment regimen on the basis of therapeutic CGM testing results.  Pt is checking their blood sugars at least 4 times daily via sensor.  Pt is seen every 6 months for evaluation of diabetes control.      BP Readings from Last 2 Encounters:   06/03/24 126/78   09/08/23 130/78     Hemoglobin A1C (%)   Date Value   09/08/2023 7.4 (H)   04/17/2023 7.5 (H)   04/02/2021 9.0 (H)   09/28/2020 7.9 (H)     LDL Cholesterol Calculated   Date Value   01/18/2023      Comment:     Cannot estimate LDL when triglyceride exceeds 400 mg/dL   11/08/2021 37 mg/dL   09/28/2020 51 mg/dL   08/19/2019 45 mg/dL     LDL Cholesterol Direct (mg/dL)   Date Value   01/18/2023 62             Hyperlipidemia Follow-Up    Are you regularly taking any medication or supplement to lower your cholesterol?   Yes- Lipitor  Are you having muscle aches or other side effects that you think could be caused by your cholesterol lowering medication?  No      Review of Systems  See HPI         Objective    /78   Pulse 89   Temp 98  F (36.7  C) (Tympanic)   Resp 14   Wt 133.8 kg (295 lb)   SpO2 96%   BMI 40.01 kg/m    Body mass index is 40.01 kg/m .  Physical Exam   GENERAL: alert and no distress  NECK: no adenopathy, no asymmetry, masses, or scars  RESP: lungs clear to auscultation - no rales, rhonchi or wheezes  CV: regular rate and rhythm, normal S1 S2, no S3 or S4, no murmur, click or rub, no peripheral edema  MS: no gross musculoskeletal defects noted, no edema  Diabetic foot exam: normal DP and PT pulses, no trophic  changes or ulcerative lesions, normal sensory exam, severe tinea pedis between all toes, peeling skin along the plantar aspects bilaterally, and onychomycosis of the great toes.         Signed Electronically by: Jesus Lewis PA-C

## 2024-06-04 LAB — HIV 1+2 AB+HIV1 P24 AG SERPL QL IA: NONREACTIVE

## 2024-06-09 ENCOUNTER — E-CONSULT (OUTPATIENT)
Dept: DERMATOLOGY | Facility: CLINIC | Age: 59
End: 2024-06-09
Payer: COMMERCIAL

## 2024-06-09 PROCEDURE — 99451 NTRPROF PH1/NTRNET/EHR 5/>: CPT | Performed by: DERMATOLOGY

## 2024-06-10 PROCEDURE — 82274 ASSAY TEST FOR BLOOD FECAL: CPT | Performed by: PHYSICIAN ASSISTANT

## 2024-06-10 NOTE — PROGRESS NOTES
"6/9/2024     E-Consult has been accepted.    Interprofessional consultation requested by:  Jesus Lewis PA-C      Clinical Question/Purpose: MY CLINICAL QUESTION IS: This patient is a 58-year-old male with history of type 2 diabetes well-controlled, hyperlipidemia, obesity, likely fatty liver disease who has persistent tinea pedis despite topical treatments with clotrimazole and topical terbinafine.  We are likely to start him on oral terbinafine but I am worried about reinoculation/recurrence over time and I am wondering if the addition of chronic ciclopirox topical would be a good idea for both management and prevention.  If you have other recommendations on how to treat severe tinea pedis (worse between the toes) outside of the typical topicals, oral terbinafine and changes in hygiene/lifestyle they will be greatly appreciated. Thank you!     Patient assessment and information reviewed: notes; no photos    Recommendations:   Addition of topical antifungals is one option, though there is theoretical concern for development of resistance with longstanding treatment, especially if incomplete. Would alternatively consider dilute bleach soaks (recipe below) or dilute 1:4 vinegar:water soaks a few times per week as an alternative, as these approaches are antimicrobial and less likely to lead to resistance, thus may be more appropriate for prophylactic treatment. Breakthroughs may still occur which require treatment courses of topical antifungals.    There are alternative regimens including pulsed dose itraconazole (treat for 1 week on/3 weeks off x3 months). In some cases prolongation of systemic terbinafine may be necessary. Consider culturing the fungus if it remains refractory, as resistance may have already developed. Would also consider monitoring liver enzymes intermittently while on systemic treatment.    The recipe for \"dilute bleach soaks\" is as follows:    1. Use 1 teaspoon of plain, unscented bleach to " "a half-gallon of lukewarm water. On the bottle, bleach might be called \"sodium hypochlorite.\" These mean the same thing. The concentration should be about 6 to 8.75%. Do NOT use bleach called \"concentrated,\" \"splashless,\" or having a fragrance. A generic/store brand is okay.  2. Soak a CLEAN wash cloth in the mixture and wring it out.  3. Apply the damp wash cloth to the affected area for about 10 minutes. Pat the area dry.   4. Repeat this daily.         The recommendations provided in this E-Consult are based on a review of clinical data pertinent to the clinical question presented, without a review of the patient's complete medical record or, the benefit of a comprehensive in-person or virtual patient evaluation. This consultation should not replace the clinical judgement and evaluation of the provider ordering this E-Consult. Any new clinical issues, or changes in patient status since the filing of this E-Consult will need to be taken into account when assessing these recommendations. Please contact me if you have further questions.    My total time spent reviewing clinical information and formulating assessment was 5 minutes.    Ko Velasquez MD, FAAD   of Dermatology  Department of Dermatology  [employer] [facility] [employer]    "

## 2024-06-11 LAB — HEMOCCULT STL QL IA: NEGATIVE

## 2024-07-16 ENCOUNTER — PATIENT OUTREACH (OUTPATIENT)
Dept: CARE COORDINATION | Facility: CLINIC | Age: 59
End: 2024-07-16
Payer: COMMERCIAL

## 2024-08-08 ENCOUNTER — PATIENT OUTREACH (OUTPATIENT)
Dept: CARE COORDINATION | Facility: CLINIC | Age: 59
End: 2024-08-08
Payer: COMMERCIAL

## 2024-08-28 ENCOUNTER — TELEPHONE (OUTPATIENT)
Dept: FAMILY MEDICINE | Facility: CLINIC | Age: 59
End: 2024-08-28
Payer: COMMERCIAL

## 2024-08-28 NOTE — TELEPHONE ENCOUNTER
"Please call Chepe with this message.     Recommendations from the Dermatologist to help treat his athletes foot are below. The Dermatology recommends to stop using the OTC athletes foot creams/sprays as this can lead to resistance. He recommends dilute bleach soaks instead, using the recipe below.      1. Use 1 teaspoon of plain, unscented bleach to a half-gallon of lukewarm water. On the bottle, bleach might be called \"sodium hypochlorite.\" These mean the same thing. The concentration should be about 6 to 8.75%. Do NOT use bleach called \"concentrated,\" \"splashless,\" or having a fragrance. A generic/store brand is okay.  2. Soak a CLEAN wash cloth in the mixture and wring it out.  3. Apply the damp wash cloth to the affected area for about 10 minutes. Pat the area dry.   4. Repeat this daily.    Jesus Lewis PA-C     "

## 2024-08-30 ENCOUNTER — MYC MEDICAL ADVICE (OUTPATIENT)
Dept: FAMILY MEDICINE | Facility: CLINIC | Age: 59
End: 2024-08-30
Payer: COMMERCIAL

## 2024-08-30 NOTE — TELEPHONE ENCOUNTER
Patient called back. Relayed message from [employer]. Patient verbalizes understanding.  Niesha Samuel RN on 8/30/2024 at 9:01 AM

## 2024-08-30 NOTE — TELEPHONE ENCOUNTER
"lPatient Contact    Attempt # [phone]    Was call answered?  No.  Left message on voicemail with information to call me back. [employer] message also sent to patient with the following recommendations per Chicho BARROW, requested patient confirm that he received message:      Please call Chepe with this message.      Recommendations from the Dermatologist to help treat his athletes foot are below. The Dermatology recommends to stop using the OTC athletes foot creams/sprays as this can lead to resistance. He recommends dilute bleach soaks instead, using the recipe below.      1. Use 1 teaspoon of plain, unscented bleach to a half-gallon of lukewarm water. On the bottle, bleach might be called \"sodium hypochlorite.\" These mean the same thing. The concentration should be about 6 to 8.75%. Do NOT use bleach called \"concentrated,\" \"splashless,\" or having a fragrance. A generic/store brand is okay.  2. Soak a CLEAN wash cloth in the mixture and wring it out.  3. Apply the damp wash cloth to the affected area for about 10 minutes. Pat the area dry.   4. Repeat this daily.     Matthew Weiseman, PA-C Julie Behrendt RN    "

## 2024-11-19 ENCOUNTER — IMMUNIZATION (OUTPATIENT)
Dept: FAMILY MEDICINE | Facility: CLINIC | Age: 59
End: 2024-11-19
Payer: COMMERCIAL

## 2024-11-19 ENCOUNTER — MYC REFILL (OUTPATIENT)
Dept: FAMILY MEDICINE | Facility: CLINIC | Age: 59
End: 2024-11-19

## 2024-11-19 DIAGNOSIS — E11.9 TYPE 2 DIABETES MELLITUS WITHOUT COMPLICATION, WITHOUT LONG-TERM CURRENT USE OF INSULIN (H): ICD-10-CM

## 2024-11-19 PROCEDURE — 90480 ADMN SARSCOV2 VAC 1/ONLY CMP: CPT

## 2024-11-19 PROCEDURE — 91320 SARSCV2 VAC 30MCG TRS-SUC IM: CPT

## 2024-11-19 PROCEDURE — 90673 RIV3 VACCINE NO PRESERV IM: CPT

## 2024-11-19 PROCEDURE — 90471 IMMUNIZATION ADMIN: CPT

## 2024-11-19 RX ORDER — FLASH GLUCOSE SENSOR
KIT MISCELLANEOUS
Qty: 2 EACH | Refills: 5 | Status: SHIPPED | OUTPATIENT
Start: 2024-11-19

## 2024-12-03 ENCOUNTER — OFFICE VISIT (OUTPATIENT)
Dept: FAMILY MEDICINE | Facility: CLINIC | Age: 59
End: 2024-12-03
Payer: COMMERCIAL

## 2024-12-03 VITALS
HEART RATE: 89 BPM | RESPIRATION RATE: 18 BRPM | DIASTOLIC BLOOD PRESSURE: 76 MMHG | BODY MASS INDEX: 40.71 KG/M2 | WEIGHT: 300.6 LBS | HEIGHT: 72 IN | TEMPERATURE: 97.4 F | OXYGEN SATURATION: 98 % | SYSTOLIC BLOOD PRESSURE: 122 MMHG

## 2024-12-03 DIAGNOSIS — E66.01 MORBID OBESITY (H): ICD-10-CM

## 2024-12-03 DIAGNOSIS — I73.9 CLAUDICATION OF CALF MUSCLES (H): ICD-10-CM

## 2024-12-03 DIAGNOSIS — E11.9 TYPE 2 DIABETES MELLITUS WITHOUT COMPLICATION, WITHOUT LONG-TERM CURRENT USE OF INSULIN (H): Primary | ICD-10-CM

## 2024-12-03 LAB
EST. AVERAGE GLUCOSE BLD GHB EST-MCNC: 174 MG/DL
HBA1C MFR BLD: 7.7 % (ref 0–5.6)
HOLD SPECIMEN: NORMAL

## 2024-12-03 PROCEDURE — 83036 HEMOGLOBIN GLYCOSYLATED A1C: CPT | Performed by: PHYSICIAN ASSISTANT

## 2024-12-03 PROCEDURE — G2211 COMPLEX E/M VISIT ADD ON: HCPCS | Performed by: PHYSICIAN ASSISTANT

## 2024-12-03 PROCEDURE — 99214 OFFICE O/P EST MOD 30 MIN: CPT | Performed by: PHYSICIAN ASSISTANT

## 2024-12-03 PROCEDURE — 36415 COLL VENOUS BLD VENIPUNCTURE: CPT | Performed by: PHYSICIAN ASSISTANT

## 2024-12-03 ASSESSMENT — PAIN SCALES - GENERAL: PAINLEVEL_OUTOF10: MODERATE PAIN (4)

## 2024-12-03 NOTE — PATIENT INSTRUCTIONS
Please get MRI on your back done to figure out what is going on with your leg pain.     Try to eat a little better and get some more movement in the meantime. We will not adjust your diabetes medicines at this time.     Follow-up with me in 3 months for recheck.

## 2024-12-03 NOTE — PROGRESS NOTES
Assessment & Plan   Type 2 diabetes mellitus without complication, without long-term current use of insulin (H)  A1c is 7.7%. Would like achieve better control under 7%. Incorporate lifestyle management at this time with diet modifications and increased physical activity. Follow up in 3 months. If A1c is still elevated, consider switching to Mounjaro in replacement of Ozempic.  - Hemoglobin A1c; Future    Morbid obesity (H)  Body mass index is 40.77 kg/m .. Associated with DM2, HLD which would improve with weight loss. Encouraged healthy lifestyle changes.     Claudication of calf muscles (H)  Work up thus far has been negative including ABIs. Lumbar spine XR. This is a limiting factor in his mobility and desire to do more lifestyle changes for his DM. Will move forward with Lumbar spine MR to see if there is canal stenosis and a neurogenic cause of his bilateral calf pain. If not, would recommend follow-up with Vascular specialty.    - MR Lumbar Spine w/o Contrast; Future    The longitudinal plan of care for the diagnosis(es)/condition(s) as documented were addressed during this visit. Due to the added complexity in care, I will continue to support Chepe in the subsequent management and with ongoing continuity of care.     BMI  Estimated body mass index is 40.77 kg/m  as calculated from the following:    Height as of this encounter: 1.829 m (6').    Weight as of this encounter: 136.4 kg (300 lb 9.6 oz).     Subjective   Chepe is a 59 year old, presenting for the following health issues:  Diabetes      12/3/2024     9:53 AM   Additional Questions   Roomed by Denise GUEVARA CMA   Accompanied by Self     History of Present Illness       Diabetes:   He presents for follow up of diabetes.  He is checking home blood glucose four or more times daily.   He checks blood glucose before and after meals.  Blood glucose is never over 200 and never under 70.  When his blood glucose is low, the patient is asymptomatic for confusion,  blurred vision, lethargy and reports not feeling dizzy, shaky, or weak.   He has no concerns regarding his diabetes at this time.   He is not experiencing numbness or burning in feet, excessive thirst, blurry vision, weight changes or redness, sores or blisters on feet. The patient has not had a diabetic eye exam in the last 12 months.          He eats 2-3 servings of fruits and vegetables daily.He consumes 0 sweetened beverage(s) daily.He exercises with enough effort to increase his heart rate 9 or less minutes per day.  He exercises with enough effort to increase his heart rate 3 or less days per week.   He is taking medications regularly.     Diabetes:    - vision is becoming blurrier    - no reported changes in sensation of upper and lower extremities    Leg pain:    - burning sensation bilaterally from the knees down ongoing for a couple years    - compression stockings help - when removed the pain is unbearable   - restricts him from being physically active    Review of Systems  See HPI       Objective    /76 (BP Location: Right arm, Patient Position: Sitting, Cuff Size: Adult Large)   Pulse 89   Temp 97.4  F (36.3  C) (Tympanic)   Resp 18   Ht 1.829 m (6')   Wt 136.4 kg (300 lb 9.6 oz)   SpO2 98%   BMI 40.77 kg/m    Body mass index is 40.77 kg/m .  Physical Exam   GENERAL: alert and no distress  NECK: no adenopathy, no asymmetry, masses, or scars  RESP: lungs clear to auscultation - no rales, rhonchi or wheezes  CV: regular rate and rhythm, normal S1 S2, no S3 or S4, no murmur, click or rub, no peripheral edema  ABDOMEN: soft, nontender, no hepatosplenomegaly, no masses and bowel sounds normal  MS: no gross musculoskeletal defects noted, no edema      Results for orders placed or performed in visit on 12/03/24   Hemoglobin A1c     Status: Abnormal   Result Value Ref Range    Estimated Average Glucose 174 (H) <117 mg/dL    Hemoglobin A1C 7.7 (H) 0.0 - 5.6 %   Extra Tube     Status: None     Narrative    The following orders were created for panel order Extra Tube.  Procedure                               Abnormality         Status                     ---------                               -----------         ------                     Extra Green Top (Lithium...[[id number]]                      Final result                 Please view results for these tests on the individual orders.   Extra Green Top (Lithium Heparin) Tube     Status: None   Result Value Ref Range    [facility]           Physician Attestation   I, Jesus Lewis PA-C, was present with the medical/ALIVIA student who participated in the service and in the documentation of the note.  I have verified the history and personally performed the physical exam and medical decision making.  I agree with the assessment and plan of care as documented in the note.      Jesus Lewis PA-C

## 2024-12-20 ENCOUNTER — HOSPITAL ENCOUNTER (OUTPATIENT)
Dept: MRI IMAGING | Facility: CLINIC | Age: 59
Discharge: HOME | End: 2024-12-20
Attending: PHYSICIAN ASSISTANT | Admitting: PHYSICIAN ASSISTANT
Payer: COMMERCIAL

## 2024-12-20 DIAGNOSIS — I73.9 CLAUDICATION OF CALF MUSCLES (H): ICD-10-CM

## 2024-12-20 PROCEDURE — 72148 MRI LUMBAR SPINE W/O DYE: CPT

## 2024-12-26 ENCOUNTER — PATIENT OUTREACH (OUTPATIENT)
Dept: CARE COORDINATION | Facility: CLINIC | Age: 59
End: 2024-12-26
Payer: COMMERCIAL

## 2024-12-30 ENCOUNTER — PATIENT OUTREACH (OUTPATIENT)
Dept: CARE COORDINATION | Facility: CLINIC | Age: 59
End: 2024-12-30
Payer: COMMERCIAL

## 2025-01-09 ENCOUNTER — OFFICE VISIT (OUTPATIENT)
Dept: NEUROSURGERY | Facility: CLINIC | Age: 60
End: 2025-01-09
Attending: PHYSICIAN ASSISTANT
Payer: COMMERCIAL

## 2025-01-09 VITALS
WEIGHT: 300 LBS | BODY MASS INDEX: 40.63 KG/M2 | DIASTOLIC BLOOD PRESSURE: 66 MMHG | TEMPERATURE: 97.9 F | HEIGHT: 72 IN | SYSTOLIC BLOOD PRESSURE: 124 MMHG | HEART RATE: 119 BPM

## 2025-01-09 DIAGNOSIS — M48.062 SPINAL STENOSIS OF LUMBAR REGION WITH NEUROGENIC CLAUDICATION: ICD-10-CM

## 2025-01-09 DIAGNOSIS — R20.2 BILATERAL LEG PARESTHESIA: Primary | ICD-10-CM

## 2025-01-09 DIAGNOSIS — I73.9 CLAUDICATION OF CALF MUSCLES: ICD-10-CM

## 2025-01-09 ASSESSMENT — PAIN SCALES - GENERAL: PAINLEVEL_OUTOF10: MODERATE PAIN (5)

## 2025-01-09 NOTE — LETTER
1/9/2025      Chepe Elkins  [address] [id number]      Dear Colleague,    Thank you for referring your patient, Chepe Elkins, to the [facility] NEUROSURGERY [facility]INIC [place]. Please see a copy of my visit note below.    Chepe Elkins is a 59 year old male who presents for:  Chief Complaint   Patient presents with     Neurologic Problem        Initial Vitals:  /66 (BP Location: Right arm, Cuff Size: Adult Large)   Pulse 119   Temp 97.9  F (36.6  C) (Temporal)   Ht 6' (1.829 m)   Wt 300 lb (136.1 kg)   BMI 40.69 kg/m   Estimated body mass index is 40.69 kg/m  as calculated from the following:    Height as of this encounter: 6' (1.829 m).    Weight as of this encounter: 300 lb (136.1 kg).. Body surface area is 2.63 meters squared. BP completed using cuff size: large  Moderate Pain (5)    Nursing Comments:     Camille Calle, [facility] Neurosurgery  Neurosurgery Clinic Visit      CC: leg paresthesias and pain    Primary care Provider: Jesus Lewis    Reason For Visit:   I was asked by Jesus Lewis PA-C to consult on the patient for claudication of calf muscles, lumbar spinal stenosis with neurogenic claudication.    HPI: Chepe Elkins is a 59 year old male with a history of chronic leg paresthesias and pain who presents for evaluation. He describes non-specific leg pain, paresthesias and weakness of his legs. He states symptoms are worse with prolonged walking/standing. No overt weakness on exam. He has undergone RONA testing which was negative.     Past Medical History:   Diagnosis Date     Arthritis same     Diabetes (H) on file       Past Medical History reviewed with patient during visit.    No past surgical history on file.  Past Surgical History reviewed with patient during visit.    Current Outpatient Medications   Medication Sig Dispense Refill     atorvastatin (LIPITOR) 20 MG tablet Take 1 tablet (20 mg) by mouth daily 90 tablet 3     BD JAVI U/F 32G X  4 MM insulin pen needle USE DAILY WITH VICTOZA 100 each 2     blood glucose (NO BRAND SPECIFIED) lancets standard Use to test blood sugar 1 time daily. 100 each 3     blood glucose (NO BRAND SPECIFIED) test strip Use to test blood sugar 1 time daily. 100 strip 3     blood glucose monitoring (NO BRAND SPECIFIED) meter device kit Use to test blood sugar 2-3 times daily or as directed. 1 kit 0     Continuous Glucose Sensor (FREESTYLE LEVI 14 DAY SENSOR) MISC Change every 14 days. 2 each 5     empagliflozin (JARDIANCE) 25 MG TABS tablet Take 1 tablet (25 mg) by mouth daily 90 tablet 3     lisinopril (ZESTRIL) 10 MG tablet Take 1 tablet (10 mg) by mouth daily 90 tablet 3     metFORMIN (GLUCOPHAGE) 1000 MG tablet TAKE ONE TABLET BY MOUTH TWICE A DAY WITH MEALS. 180 tablet 3     Multiple Vitamin (MULTI VITAMIN MENS PO) Take 1 tablet by mouth daily       rivaroxaban ANTICOAGULANT (XARELTO ANTICOAGULANT) 20 MG TABS tablet Take 1 tablet (20 mg) by mouth daily (with dinner) 90 tablet 3     Semaglutide, 2 MG/DOSE, (OZEMPIC, 2 MG/DOSE,) 8 MG/3ML pen Inject 2 mg Subcutaneous every 7 days 9 mL 3     sildenafil (REVATIO) 20 MG tablet Sidenafil (Viagra) comes in 20mg strength pills. Take as many pills as needed up to maximum of 5 pills at a time prior to intercourse. (Patient not taking: Reported on 2025) 50 tablet 11     No current facility-administered medications for this visit.       No Known Allergies    Social History     Socioeconomic History     Marital status:      Spouse name: None     Number of children: None     Years of education: None     Highest education level: None   Tobacco Use     Smoking status: Former     Current packs/day: 0.00     Average packs/day: 1 pack/day for 25.0 years (25.0 ttl pk-yrs)     Types: Cigarettes     Start date: 1985     Quit date: 2010     Years since quittin.8     Smokeless tobacco: Never     Tobacco comments:     started at age 19   Vaping Use     Vaping status:  Never Used   Substance and Sexual Activity     Alcohol use: Yes     Comment: rarely     Drug use: No     Sexual activity: Yes     Partners: Female   Other Topics Concern     Parent/sibling w/ CABG, MI or angioplasty before 65F 55M? No     Social Drivers of Health     Interpersonal Safety: Low Risk  (12/3/2024)    Interpersonal Safety      Do you feel physically and emotionally safe where you currently live?: Yes      Within the past 12 months, have you been hit, slapped, kicked or otherwise physically hurt by someone?: No      Within the past 12 months, have you been humiliated or emotionally abused in other ways by your partner or ex-partner?: No       Family History   Problem Relation Age of Onset     Diabetes Mother      Prostate Cancer No family hx of      Colon Cancer No family hx of          ROS: 10 point ROS neg other than the symptoms noted above in the HPI.    Vital Signs:   /66 (BP Location: Right arm, Cuff Size: Adult Large)   Pulse 119   Temp 97.9  F (36.6  C) (Temporal)   Ht 6' (1.829 m)   Wt 300 lb (136.1 kg)   BMI 40.69 kg/m        Examination:  Constitutional:  Alert, well nourished, NAD.  Memory: recent and remote memory   HEENT: Normocephalic, atraumatic.   Pulm:  Without shortness of breath   CV:  No pitting edema of BLE.      Neurological:  Awake  Alert  Oriented x 3  Speech clear    Motor exam:   Hip Flexion:                 Right: 5/5  Left:  5/5  Hip Abduction:             Right:  5/5  Left:  5/5  Hip Adduction:             Right:  5/5  Left:  5/5  Plantar Flexion:           Right:  5/5  Left:  5/5  Dorsal Flexion:            Right:  5/5  Left:  5/5  EHL:                            Right:  5/5  Left:  5/5     Sensation normal to bilateral upper and lower extremities  Muscle tone to bilateral upper and lower extremities   Gait: Able to stand from a seated position. Normal non-antalgic, non-myelopathic gait.  Able to heel/toe walk without loss of balance    Lumbar examination reveals  no tenderness of the spine or paraspinous muscles.  Hip height is symmetrical. Negative SI joint, sciatic notch or greater trochanteric tenderness to palpation bilaterally.  Straight leg raise is negative bilaterally.      Imaging:   Lumbar MRI 12/20/2024   IMPRESSION:  1. Multilevel degenerative changes, as described.  2. There is up to moderate central spinal canal stenosis at L3-L4.  There is up to moderate right lateral recess stenosis at L4-L5. Lesser  degrees of spinal canal and lateral recess stenosis elsewhere.  3. Mild/mild to moderate multilevel neural foraminal stenosis.  4. Bilateral facet joint fluid signal at L3-L4 with mild patchy  edema-like marrow signal change centered about the right L3-L4 facet  joint, nonspecific. This may be reactive in the setting of  degenerative facet disease. Please correlate clinically.    Assessment/Plan:   Lumbar spinal stenosis  Bilateral leg paresthesias    Reviewed lumbar MRI. Will obtain BLE EMG for further evaluation of symptoms. I will contact him with the results and further recommendations. Will begin physical therapy as well. If no findings on EMG, would consider cervical/thoracic MRI imaging. He verbalized understanding and agreement.     Patient Instructions   -Bilateral leg EMG (nerve conduction study) ordered. They will contact you to schedule. I will contact you with the results when they are available.  -Physical therapy ordered. They will contact you to schedule.  -Please contact our clinic with questions or concerns at [phone].    Nita Sams CNP  [facility] Neurosurgery  [address] [place] [id number]  Tel [phone]  Fax [phone]      Again, thank you for allowing me to participate in the care of your patient.        Sincerely,        Nita Sams NP    Electronically signed

## 2025-01-09 NOTE — PROGRESS NOTES
Chepe Elkins is a 59 year old male who presents for:  Chief Complaint   Patient presents with    Neurologic Problem        Initial Vitals:  /66 (BP Location: Right arm, Cuff Size: Adult Large)   Pulse 119   Temp 97.9  F (36.6  C) (Temporal)   Ht 6' (1.829 m)   Wt 300 lb (136.1 kg)   BMI 40.69 kg/m   Estimated body mass index is 40.69 kg/m  as calculated from the following:    Height as of this encounter: 6' (1.829 m).    Weight as of this encounter: 300 lb (136.1 kg).. Body surface area is 2.63 meters squared. BP completed using cuff size: large  Moderate Pain (5)    Nursing Comments:     Camille Calle CMA

## 2025-01-09 NOTE — PATIENT INSTRUCTIONS
-Bilateral leg EMG (nerve conduction study) ordered. They will contact you to schedule. I will contact you with the results when they are available.  -Physical therapy ordered. They will contact you to schedule.  -Please contact our clinic with questions or concerns at [phone].

## 2025-01-09 NOTE — PROGRESS NOTES
[facility] Neurosurgery  Neurosurgery Clinic Visit      CC: leg paresthesias and pain    Primary care Provider: Jesus Lewis    Reason For Visit:   I was asked by Jesus Lewis PA-C to consult on the patient for claudication of calf muscles, lumbar spinal stenosis with neurogenic claudication.    HPI: Chepe Elkins is a 59 year old male with a history of chronic leg paresthesias and pain who presents for evaluation. He describes non-specific leg pain, paresthesias and weakness of his legs. He states symptoms are worse with prolonged walking/standing. No overt weakness on exam. He has undergone RONA testing which was negative.     Past Medical History:   Diagnosis Date    Arthritis same    Diabetes (H) on file       Past Medical History reviewed with patient during visit.    No past surgical history on file.  Past Surgical History reviewed with patient during visit.    Current Outpatient Medications   Medication Sig Dispense Refill    atorvastatin (LIPITOR) 20 MG tablet Take 1 tablet (20 mg) by mouth daily 90 tablet 3    BD JAVI U/F 32G X 4 MM insulin pen needle USE DAILY WITH VICTOZA 100 each 2    blood glucose (NO BRAND SPECIFIED) lancets standard Use to test blood sugar 1 time daily. 100 each 3    blood glucose (NO BRAND SPECIFIED) test strip Use to test blood sugar 1 time daily. 100 strip 3    blood glucose monitoring (NO BRAND SPECIFIED) meter device kit Use to test blood sugar 2-3 times daily or as directed. 1 kit 0    Continuous Glucose Sensor (FREESTYLE LEVI 14 DAY SENSOR) MISC Change every 14 days. 2 each 5    empagliflozin (JARDIANCE) 25 MG TABS tablet Take 1 tablet (25 mg) by mouth daily 90 tablet 3    lisinopril (ZESTRIL) 10 MG tablet Take 1 tablet (10 mg) by mouth daily 90 tablet 3    metFORMIN (GLUCOPHAGE) 1000 MG tablet TAKE ONE TABLET BY MOUTH TWICE A DAY WITH MEALS. 180 tablet 3    Multiple Vitamin (MULTI VITAMIN MENS PO) Take 1 tablet by mouth daily      rivaroxaban ANTICOAGULANT  (XARELTO ANTICOAGULANT) 20 MG TABS tablet Take 1 tablet (20 mg) by mouth daily (with dinner) 90 tablet 3    Semaglutide, 2 MG/DOSE, (OZEMPIC, 2 MG/DOSE,) 8 MG/3ML pen Inject 2 mg Subcutaneous every 7 days 9 mL 3    sildenafil (REVATIO) 20 MG tablet Sidenafil (Viagra) comes in 20mg strength pills. Take as many pills as needed up to maximum of 5 pills at a time prior to intercourse. (Patient not taking: Reported on 2025) 50 tablet 11     No current facility-administered medications for this visit.       No Known Allergies    Social History     Socioeconomic History    Marital status:      Spouse name: None    Number of children: None    Years of education: None    Highest education level: None   Tobacco Use    Smoking status: Former     Current packs/day: 0.00     Average packs/day: 1 pack/day for 25.0 years (25.0 ttl pk-yrs)     Types: Cigarettes     Start date: 1985     Quit date: 2010     Years since quittin.8    Smokeless tobacco: Never    Tobacco comments:     started at age 19   Vaping Use    Vaping status: Never Used   Substance and Sexual Activity    Alcohol use: Yes     Comment: rarely    Drug use: No    Sexual activity: Yes     Partners: Female   Other Topics Concern    Parent/sibling w/ CABG, MI or angioplasty before 65F 55M? No     Social Drivers of Health     Interpersonal Safety: Low Risk  (12/3/2024)    Interpersonal Safety     Do you feel physically and emotionally safe where you currently live?: Yes     Within the past 12 months, have you been hit, slapped, kicked or otherwise physically hurt by someone?: No     Within the past 12 months, have you been humiliated or emotionally abused in other ways by your partner or ex-partner?: No       Family History   Problem Relation Age of Onset    Diabetes Mother     Prostate Cancer No family hx of     Colon Cancer No family hx of          ROS: 10 point ROS neg other than the symptoms noted above in the HPI.    Vital Signs:   /66  (BP Location: Right arm, Cuff Size: Adult Large)   Pulse 119   Temp 97.9  F (36.6  C) (Temporal)   Ht 6' (1.829 m)   Wt 300 lb (136.1 kg)   BMI 40.69 kg/m        Examination:  Constitutional:  Alert, well nourished, NAD.  Memory: recent and remote memory   HEENT: Normocephalic, atraumatic.   Pulm:  Without shortness of breath   CV:  No pitting edema of BLE.      Neurological:  Awake  Alert  Oriented x 3  Speech clear    Motor exam:   Hip Flexion:                 Right: 5/5  Left:  5/5  Hip Abduction:             Right:  5/5  Left:  5/5  Hip Adduction:             Right:  5/5  Left:  5/5  Plantar Flexion:           Right:  5/5  Left:  5/5  Dorsal Flexion:            Right:  5/5  Left:  5/5  EHL:                            Right:  5/5  Left:  5/5     Sensation normal to bilateral upper and lower extremities  Muscle tone to bilateral upper and lower extremities   Gait: Able to stand from a seated position. Normal non-antalgic, non-myelopathic gait.  Able to heel/toe walk without loss of balance    Lumbar examination reveals no tenderness of the spine or paraspinous muscles.  Hip height is symmetrical. Negative SI joint, sciatic notch or greater trochanteric tenderness to palpation bilaterally.  Straight leg raise is negative bilaterally.      Imaging:   Lumbar MRI 12/20/2024   IMPRESSION:  1. Multilevel degenerative changes, as described.  2. There is up to moderate central spinal canal stenosis at L3-L4.  There is up to moderate right lateral recess stenosis at L4-L5. Lesser  degrees of spinal canal and lateral recess stenosis elsewhere.  3. Mild/mild to moderate multilevel neural foraminal stenosis.  4. Bilateral facet joint fluid signal at L3-L4 with mild patchy  edema-like marrow signal change centered about the right L3-L4 facet  joint, nonspecific. This may be reactive in the setting of  degenerative facet disease. Please correlate clinically.    Assessment/Plan:   Lumbar spinal stenosis  Bilateral leg  paresthesias    Reviewed lumbar MRI. Will obtain BLE EMG for further evaluation of symptoms. I will contact him with the results and further recommendations. Will begin physical therapy as well. If no findings on EMG, would consider cervical/thoracic MRI imaging. He verbalized understanding and agreement.     Patient Instructions   -Bilateral leg EMG (nerve conduction study) ordered. They will contact you to schedule. I will contact you with the results when they are available.  -Physical therapy ordered. They will contact you to schedule.  -Please contact our clinic with questions or concerns at [phone].    Nita Sams, [facility] Neurosurgery  [address] [place] [address]  Tel [phone]  Fax [phone]

## 2025-01-16 ENCOUNTER — TRANSFERRED RECORDS (OUTPATIENT)
Dept: HEALTH INFORMATION MANAGEMENT | Facility: CLINIC | Age: 60
End: 2025-01-16
Payer: COMMERCIAL

## 2025-01-16 LAB — RETINOPATHY: NEGATIVE

## 2025-01-22 ENCOUNTER — THERAPY VISIT (OUTPATIENT)
Dept: PHYSICAL THERAPY | Facility: CLINIC | Age: 60
End: 2025-01-22
Attending: NURSE PRACTITIONER
Payer: COMMERCIAL

## 2025-01-22 DIAGNOSIS — R20.2 BILATERAL LEG PARESTHESIA: ICD-10-CM

## 2025-01-22 DIAGNOSIS — M48.062 SPINAL STENOSIS OF LUMBAR REGION WITH NEUROGENIC CLAUDICATION: ICD-10-CM

## 2025-01-22 PROCEDURE — 97161 PT EVAL LOW COMPLEX 20 MIN: CPT | Mod: GP | Performed by: PHYSICAL MEDICINE & REHABILITATION

## 2025-01-22 PROCEDURE — 97110 THERAPEUTIC EXERCISES: CPT | Mod: GP | Performed by: PHYSICAL MEDICINE & REHABILITATION

## 2025-01-22 NOTE — PROGRESS NOTES
PHYSICAL THERAPY EVALUATION  Type of Visit: Evaluation     Fall Risk Screen:  Fall screen completed by: PT  Have you fallen 2 or more times in the past year?: No  Have you fallen and had an injury in the past year?: No  Is patient a fall risk?: No    Subjective       Presenting condition or subjective complaint: drs think therapy is next step. Patient reports LBP that started about 2.5 years ago, cannot recall an instance when pain started. Has numbness in bilateral legs about 1-2 times a day with relief after sitting. Some increased pain when coughing/sneezing. Takes Tramadol and Tylenol regularly.   Date of onset: 01/09/25 (order date)    Relevant medical history: Arthritis; Cold or hot arm or leg   Dates & types of surgery:      Prior diagnostic imaging/testing results: MRI     MR IMPRESSION:  1. Multilevel degenerative changes.  2. There is up to moderate central spinal canal stenosis at L3-L4. There is up to moderate right lateral recess stenosis at L4-L5. Lesser degrees of spinal canal and lateral recess stenosis elsewhere.  3. Mild/mild to moderate multilevel neural foraminal stenosis.  4. Bilateral facet joint fluid signal at L3-L4 with mild patchy edema-like marrow signal change centered about the right L3-L4 facet joint, nonspecific.   Prior therapy history for the same diagnosis, illness or injury: No      Living Environment  Social support: With a significant other or spouse   Type of home: Multi-level   Stairs to enter the home: Yes 4 Is there a railing: Yes     Ramp: No   Stairs inside the home: Yes 22 Is there a railing: Yes     Help at home: None  Equipment owned:       Employment: Yes contractor, off mostly for the winter  Hobbies/Interests: none    Patient goals for therapy: walk or stand longer periods     Objective   LUMBAR SPINE EVALUATION  PAIN: Pain Level at Rest: 5/10  Pain Level with Use: 9/10  Pain Location: lumbar spine  Pain Quality: Burning, Numb, and Sharp  Pain Frequency:  constant  Pain is Worst: daytime  Pain is Exacerbated By: standing, walking, stairs, twisting   Pain is Relieved By: NSAIDs and rest  POSTURE: Standing Posture: Rounded shoulders, Forward head, Thoracic kyphosis increased  GAIT:   Weightbearing Status: WBAT  Assistive Device(s): None  Gait Deviations:  L Trendelenburg  BALANCE/PROPRIOCEPTION:  poor SLS BLE due to weakness in hip ABD mm  WEIGHTBEARING ALIGNMENT: Lumbar/Pelvic WB Alignment:Anterior pelvic tilt, Lordosis increased  ROM:   (Degrees) Left AROM Left PROM  Right AROM Right PROM   Hip Flexion 95*  95*    Hip Extension       Hip Abduction       Hip Adduction       Hip Internal Rotation 12*  14*    Hip External Rotation 30*  31*    Knee Flexion       Knee Extension       Lumbar Side glide 25% impaired 30% impaired   Lumbar Flexion 30% impaired   Lumbar Extension 50% impaired     STRENGTH:  L hip ABD 4-/5 ; R hip ABD 4/5 ; L hip ER 4/5; R hip ER 4/5; poor (+) core  MYOTOMES: WNL  DERMATOMES:    Left Right   T12      L1     L2     L3     L4     L5 Hypo (light touch) Hypo (light touch)   S1 Hypo (light touch) Hypo (light touch)     NEURAL TENSION:  positive Slump test BLE  FLEXIBILITY: Decreased hip IR L, Decreased hip ER L, Decreased piriformis L, Decreased hamstrings L, Decreased gastroc L, Decreased hip IR R, Decreased hip ER R, Decreased hamstrings R, Decreased gastroc R  LUMBAR/HIP Special Tests:  positive Slump test BLE    PALPATION:  tenderness noted at lower lumbar paraspinals muscles L > R      Assessment & Plan   CLINICAL IMPRESSIONS  Medical Diagnosis: Bilateral leg paresthesia (R20.2)  - Primary; Spinal stenosis of lumbar region with neurogenic claudication (M48.062)    Treatment Diagnosis:     Impression/Assessment: Patient is a 59 year old male with low back complaints.  The following significant findings have been identified: Pain, Decreased ROM/flexibility, Decreased joint mobility, Decreased strength, Impaired balance, Impaired sensation,  Impaired gait, Impaired muscle performance, and Decreased activity tolerance. These impairments interfere with their ability to perform work tasks, recreational activities, household chores, and community mobility as compared to previous level of function.     Clinical Decision Making (Complexity):  Clinical Presentation: Stable/Uncomplicated  Clinical Presentation Rationale: based on medical and personal factors listed in PT evaluation  Clinical Decision Making (Complexity): Low complexity    PLAN OF CARE  Treatment Interventions:  Modalities: E-stim, Mechanical Traction  Interventions: Manual Therapy, Neuromuscular Re-education, Therapeutic Activity, Therapeutic Exercise    Long Term Goals     PT Goal 1  Goal Identifier: STG  Goal Description: Patient to report walking for over 100 feet with <2/10 LBP.  Rationale: to maximize safety and independence within the community  Target Date: 02/26/25  PT Goal 2  Goal Identifier: STG  Goal Description: Patient to state he can do the dishes for 10 minutes with <3/10 LBP.  Rationale: to maximize safety and independence with performance of ADLs and functional tasks  Target Date: 02/26/25  PT Goal 3  Goal Identifier: LTG  Goal Description: Patient to report standing at work and walking about for more than an hour with <2/10 LBP.  Rationale: to maximize safety and independence with performance of ADLs and functional tasks  Target Date: 04/02/25  PT Goal 4  Goal Identifier: LTG  Goal Description: Patient to be IND with HEP to aid functional recovery and reduce future occurence of pain and/or disability.  Rationale: to maximize safety and independence with performance of ADLs and functional tasks  Target Date: 04/02/25      Frequency of Treatment: 1x/week  Duration of Treatment: 10 weeks    Education Assessment:   Learner/Method: Patient;No Barriers to Learning    Risks and benefits of evaluation/treatment have been explained.   Patient/Family/caregiver agrees with Plan of Care.      Evaluation Time:     PT Eval, Low Complexity Minutes (27598): 15     Signing Clinician: Cedrick Berry, PT        [facility]                                                                                   OUTPATIENT PHYSICAL THERAPY      PLAN OF TREATMENT FOR OUTPATIENT REHABILITATION   Patient's Last Name, First Name, Chepe Casarez YOB: 1965   Provider's Name   [facility]   Medical Record No.  [id number]     Onset Date: 01/09/25 (order date)  Start of Care Date: 01/22/25     Medical Diagnosis:  Bilateral leg paresthesia (R20.2)  - Primary; Spinal stenosis of lumbar region with neurogenic claudication (M48.062)      PT Treatment Diagnosis:    Plan of Treatment  Frequency/Duration: 1x/week/ 10 weeks    Certification date from 01/22/25 to 04/02/25         See note for plan of treatment details and functional goals     Cedrick Berry, PT                         I CERTIFY THE NEED FOR THESE SERVICES FURNISHED UNDER        THIS PLAN OF TREATMENT AND WHILE UNDER MY CARE     (Physician attestation of this document indicates review and certification of the therapy plan).              Referring Provider:  Nita Sams    Initial Assessment  See Epic Evaluation- Start of Care Date: 01/22/25

## 2025-01-27 ENCOUNTER — MYC REFILL (OUTPATIENT)
Dept: FAMILY MEDICINE | Facility: CLINIC | Age: 60
End: 2025-01-27
Payer: COMMERCIAL

## 2025-01-27 DIAGNOSIS — E11.9 TYPE 2 DIABETES MELLITUS WITHOUT COMPLICATION, WITHOUT LONG-TERM CURRENT USE OF INSULIN (H): ICD-10-CM

## 2025-01-27 RX ORDER — SEMAGLUTIDE 2.68 MG/ML
2 INJECTION, SOLUTION SUBCUTANEOUS
Qty: 9 ML | Refills: 3 | OUTPATIENT
Start: 2025-01-27

## 2025-01-27 RX ORDER — FLASH GLUCOSE SENSOR
KIT MISCELLANEOUS
Qty: 2 EACH | Refills: 5 | Status: SHIPPED | OUTPATIENT
Start: 2025-01-27

## 2025-01-28 ENCOUNTER — THERAPY VISIT (OUTPATIENT)
Dept: PHYSICAL THERAPY | Facility: CLINIC | Age: 60
End: 2025-01-28
Attending: NURSE PRACTITIONER
Payer: COMMERCIAL

## 2025-01-28 DIAGNOSIS — M48.062 SPINAL STENOSIS OF LUMBAR REGION WITH NEUROGENIC CLAUDICATION: Primary | ICD-10-CM

## 2025-01-28 PROCEDURE — 97110 THERAPEUTIC EXERCISES: CPT | Mod: GP | Performed by: PHYSICAL MEDICINE & REHABILITATION

## 2025-02-08 ENCOUNTER — HEALTH MAINTENANCE LETTER (OUTPATIENT)
Age: 60
End: 2025-02-08

## 2025-03-03 ENCOUNTER — OFFICE VISIT (OUTPATIENT)
Dept: FAMILY MEDICINE | Facility: CLINIC | Age: 60
End: 2025-03-03
Payer: COMMERCIAL

## 2025-03-03 VITALS
DIASTOLIC BLOOD PRESSURE: 74 MMHG | HEART RATE: 88 BPM | BODY MASS INDEX: 38.54 KG/M2 | HEIGHT: 73 IN | TEMPERATURE: 97.8 F | OXYGEN SATURATION: 95 % | WEIGHT: 290.8 LBS | SYSTOLIC BLOOD PRESSURE: 126 MMHG | RESPIRATION RATE: 18 BRPM

## 2025-03-03 DIAGNOSIS — Z86.718 HISTORY OF DEEP VENOUS THROMBOSIS: ICD-10-CM

## 2025-03-03 DIAGNOSIS — E66.01 MORBID OBESITY (H): ICD-10-CM

## 2025-03-03 DIAGNOSIS — E11.9 TYPE 2 DIABETES MELLITUS WITHOUT COMPLICATION, WITHOUT LONG-TERM CURRENT USE OF INSULIN (H): Primary | ICD-10-CM

## 2025-03-03 DIAGNOSIS — E78.5 HYPERLIPIDEMIA LDL GOAL <100: ICD-10-CM

## 2025-03-03 PROBLEM — N18.1 CKD (CHRONIC KIDNEY DISEASE) STAGE 1, GFR 90 ML/MIN OR GREATER: Status: RESOLVED | Noted: 2020-09-28 | Resolved: 2025-03-03

## 2025-03-03 LAB
ALBUMIN SERPL BCG-MCNC: 4.6 G/DL (ref 3.5–5.2)
ALP SERPL-CCNC: 63 U/L (ref 40–150)
ALT SERPL W P-5'-P-CCNC: 66 U/L (ref 0–70)
ANION GAP SERPL CALCULATED.3IONS-SCNC: 11 MMOL/L (ref 7–15)
AST SERPL W P-5'-P-CCNC: 31 U/L (ref 0–45)
BILIRUB SERPL-MCNC: 0.4 MG/DL
BUN SERPL-MCNC: 13.9 MG/DL (ref 6–20)
CALCIUM SERPL-MCNC: 9.8 MG/DL (ref 8.8–10.4)
CHLORIDE SERPL-SCNC: 98 MMOL/L (ref 98–107)
CHOLEST SERPL-MCNC: 114 MG/DL
CREAT SERPL-MCNC: 0.8 MG/DL (ref 0.67–1.17)
CREAT UR-MCNC: 25 MG/DL
EGFRCR SERPLBLD CKD-EPI 2021: >90 ML/MIN/1.73M2
EST. AVERAGE GLUCOSE BLD GHB EST-MCNC: 169 MG/DL
FASTING STATUS PATIENT QL REPORTED: YES
FASTING STATUS PATIENT QL REPORTED: YES
GLUCOSE SERPL-MCNC: 177 MG/DL (ref 70–99)
HBA1C MFR BLD: 7.5 % (ref 0–5.6)
HCO3 SERPL-SCNC: 27 MMOL/L (ref 22–29)
HDLC SERPL-MCNC: 29 MG/DL
HOLD SPECIMEN: NORMAL
LDLC SERPL CALC-MCNC: 10 MG/DL
MICROALBUMIN UR-MCNC: <12 MG/L
MICROALBUMIN/CREAT UR: NORMAL MG/G{CREAT}
NONHDLC SERPL-MCNC: 85 MG/DL
POTASSIUM SERPL-SCNC: 4.8 MMOL/L (ref 3.4–5.3)
PROT SERPL-MCNC: 7.8 G/DL (ref 6.4–8.3)
SODIUM SERPL-SCNC: 136 MMOL/L (ref 135–145)
TRIGL SERPL-MCNC: 375 MG/DL

## 2025-03-03 PROCEDURE — 83036 HEMOGLOBIN GLYCOSYLATED A1C: CPT | Performed by: PHYSICIAN ASSISTANT

## 2025-03-03 PROCEDURE — 82570 ASSAY OF URINE CREATININE: CPT | Performed by: PHYSICIAN ASSISTANT

## 2025-03-03 PROCEDURE — 82043 UR ALBUMIN QUANTITATIVE: CPT | Performed by: PHYSICIAN ASSISTANT

## 2025-03-03 PROCEDURE — 99214 OFFICE O/P EST MOD 30 MIN: CPT | Performed by: PHYSICIAN ASSISTANT

## 2025-03-03 PROCEDURE — 80061 LIPID PANEL: CPT | Performed by: PHYSICIAN ASSISTANT

## 2025-03-03 PROCEDURE — G2211 COMPLEX E/M VISIT ADD ON: HCPCS | Performed by: PHYSICIAN ASSISTANT

## 2025-03-03 PROCEDURE — 36415 COLL VENOUS BLD VENIPUNCTURE: CPT | Performed by: PHYSICIAN ASSISTANT

## 2025-03-03 PROCEDURE — 80053 COMPREHEN METABOLIC PANEL: CPT | Performed by: PHYSICIAN ASSISTANT

## 2025-03-03 RX ORDER — LISINOPRIL 10 MG/1
10 TABLET ORAL DAILY
Qty: 90 TABLET | Refills: 3 | Status: SHIPPED | OUTPATIENT
Start: 2025-03-03

## 2025-03-03 RX ORDER — ATORVASTATIN CALCIUM 20 MG/1
20 TABLET, FILM COATED ORAL DAILY
Qty: 90 TABLET | Refills: 3 | Status: SHIPPED | OUTPATIENT
Start: 2025-03-03 | End: 2025-03-05

## 2025-03-03 RX ORDER — SEMAGLUTIDE 2.68 MG/ML
2 INJECTION, SOLUTION SUBCUTANEOUS
Qty: 9 ML | Refills: 3 | Status: SHIPPED | OUTPATIENT
Start: 2025-03-03

## 2025-03-03 ASSESSMENT — PAIN SCALES - GENERAL: PAINLEVEL_OUTOF10: MODERATE PAIN (5)

## 2025-03-03 NOTE — PROGRESS NOTES
"Assessment & Plan   Type 2 diabetes mellitus without complication, without long-term current use of insulin (H)  A1c is stable at 7.5%. Has lost 10 lbs since last check as well. Will continue current regimen for now and adjust therapy based on labs. Follow-up in 6 months for recheck.   - Lipid panel reflex to direct LDL Fasting; Future  - Albumin Random Urine Quantitative with Creat Ratio; Future  - Comprehensive metabolic panel; Future  - Hemoglobin A1c; Future  - empagliflozin (JARDIANCE) 25 MG TABS tablet; Take 1 tablet (25 mg) by mouth daily.  - metFORMIN (GLUCOPHAGE) 1000 MG tablet; TAKE ONE TABLET BY MOUTH TWICE A DAY WITH MEALS.  - Semaglutide, 2 MG/DOSE, (OZEMPIC, 2 MG/DOSE,) 8 MG/3ML pen; Inject 2 mg subcutaneously every 7 days.    Morbid obesity (H)  Body mass index is 38.37 kg/m .. Associated with DM2, HLD, DVT which would improve with weight loss. Encouraged healthy lifestyle changes.     Hyperlipidemia LDL goal <100  Due for recheck and refill.   - atorvastatin (LIPITOR) 20 MG tablet; Take 1 tablet (20 mg) by mouth daily.    History of deep venous thrombosis  Asymptomatic. Refilled Xarelto.   - rivaroxaban ANTICOAGULANT (XARELTO ANTICOAGULANT) 20 MG TABS tablet; Take 1 tablet (20 mg) by mouth daily (with dinner).    The longitudinal plan of care for the diagnosis(es)/condition(s) as documented were addressed during this visit. Due to the added complexity in care, I will continue to support Chepe in the subsequent management and with ongoing continuity of care.     BMI  Estimated body mass index is 38.37 kg/m  as calculated from the following:    Height as of this encounter: 1.854 m (6' 1\").    Weight as of this encounter: 131.9 kg (290 lb 12.8 oz).     Subjective   Chepe is a 59 year old, presenting for the following health issues:  Diabetes        3/3/2025     8:15 AM   Additional Questions   Roomed by Denise GUEVARA CMA   Accompanied by Self       History of Present Illness       Diabetes:   He presents for " "follow up of diabetes.  He is checking home blood glucose four or more times daily.   He checks blood glucose before and after meals.  Blood glucose is never over 200 and sometimes under 70.  When his blood glucose is low, the patient is asymptomatic for confusion, blurred vision, lethargy and reports not feeling dizzy, shaky, or weak.   He has no concerns regarding his diabetes at this time.  He is having numbness in feet and burning in feet.            He eats 2-3 servings of fruits and vegetables daily.He consumes 0 sweetened beverage(s) daily.He exercises with enough effort to increase his heart rate 9 or less minutes per day.  He exercises with enough effort to increase his heart rate 3 or less days per week.   He is taking medications regularly.              Review of Systems  See HPI       Objective    /74 (BP Location: Right arm, Patient Position: Sitting, Cuff Size: Adult Large)   Pulse 88   Temp 97.8  F (36.6  C) (Tympanic)   Resp 18   Ht 1.854 m (6' 1\")   Wt 131.9 kg (290 lb 12.8 oz)   SpO2 95%   BMI 38.37 kg/m    Body mass index is 38.37 kg/m .  Physical Exam   Constitutional: healthy, alert, and no distress  Head: Normocephalic. Atraumatic  Eyes: No conjunctival injection, sclera anicteric  Neck: supple, no thyromegaly, nodules or asymmetry of the thyroid. No cervical LAD.  Cardiovascular: RRR. No murmurs, clicks, gallops, or rubs. No peripheral edema.   Respiratory: No resp distress. Lungs CTAB bilaterally.   Musculoskeletal: extremities normal- no gross deformities noted, and normal muscle tone  Skin: no suspicious lesions or rashes  Neurologic: Gait normal. CN 2-12 grossly intact  Psychiatric: mentation appears normal and affect normal/bright     Results for orders placed or performed in visit on 03/03/25   Hemoglobin A1c     Status: Abnormal   Result Value Ref Range    Estimated Average Glucose 169 (H) <117 mg/dL    Hemoglobin A1C 7.5 (H) 0.0 - 5.6 %   Extra Tube     Status: None    " Narrative    The following orders were created for panel order Extra Tube.  Procedure                               Abnormality         Status                     ---------                               -----------         ------                     Extra Red Top Tube[[id number]]                               Final result                 Please view results for these tests on the individual orders.   Extra Red Top Tube     Status: None   Result Value Ref Range    Hold Specimen JIC             Signed Electronically by: Jesus Lewis PA-C

## 2025-03-05 RX ORDER — ATORVASTATIN CALCIUM 20 MG/1
10 TABLET, FILM COATED ORAL DAILY
Qty: 45 TABLET | Refills: 3 | Status: SHIPPED | OUTPATIENT
Start: 2025-03-05

## 2025-03-17 ENCOUNTER — OFFICE VISIT (OUTPATIENT)
Dept: NEUROLOGY | Facility: CLINIC | Age: 60
End: 2025-03-17
Attending: NURSE PRACTITIONER
Payer: COMMERCIAL

## 2025-03-17 DIAGNOSIS — R20.2 BILATERAL LEG PARESTHESIA: ICD-10-CM

## 2025-03-17 PROCEDURE — 95885 MUSC TST DONE W/NERV TST LIM: CPT | Mod: 59 | Performed by: INTERNAL MEDICINE

## 2025-03-17 PROCEDURE — 95909 NRV CNDJ TST 5-6 STUDIES: CPT | Performed by: INTERNAL MEDICINE

## 2025-03-17 PROCEDURE — 95886 MUSC TEST DONE W/N TEST COMP: CPT | Performed by: INTERNAL MEDICINE

## 2025-03-17 NOTE — LETTER
3/17/2025       RE: Chepe Elkins  [address] [facility] [id number]     Dear Colleague,    Thank you for referring your patient, Chepe Elkins, to the [facility]. Please see a copy of my visit note below.    See procedure note.     Onel Newman MD      Again, thank you for allowing me to participate in the care of your patient.      Sincerely,    Onel Newman MD

## 2025-03-17 NOTE — PROCEDURES
[facility]  Electrodiagnostic Laboratory                 Department of Neurology                                                                                                         Test Date:  3/17/2025    Patient: Chepe Elkins : 1965 Physician: Onel Newman MD   Sex: Male AGE: 59 year Ref Phys: Nita Sams NP   ID#: [id number]   Technician: Camille Campbell     History and Examination:  Patient is a 60 yo male who presents for evaluation of numbness/tingling in the bilateral feet. He also reports non radiating low back pain. Strength is 5/5 in bilateral lower extremities.     Techniques:  Motor conduction studies were done with surface recording electrodes. Sensory conduction studies were performed with surface electrodes, unless indicated otherwise by (n), designating the use of subdermal recording electrodes. Temperature was monitored and recorded throughout the study. Upper extremities were maintained at a temperature of 32 degrees Centigrade or higher and Lower extremities were maintained at a temperature of 31 degrees Centigrade or higher.  EMG was done with a concentric needle electrode.       Results  Evaluation of the right Fibular (EDB) motor nerve showed no response.  The right Tibial (AHB) motor nerve showed reduced amplitude (3.1 mV).  The bilateral sural sensory responses showed reduced amplitude.  All remaining nerves (as indicated in the following tables) were within normal limits.      All F Wave latencies were within normal limits.      Needle evaluation of the right Tibialis Anterior and the left Tibialis Anterior muscles showed slightly increased motor unit amplitude and slightly increased motor unit duration.  The right Gastrocnemius muscle showed Incr Ins Act, slightly increased motor unit amplitude, and slightly increased motor unit duration.  The left Gastrocnemius muscle showed Incr Ins Act, slightly increased motor unit amplitude,  slightly increased motor unit duration, and recruitment.  All remaining muscles (as indicated in the following table) showed no evidence of electrical instability.          Interpretation:  This is an abnormal examination. 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 See comment.    Comment: There is no convincing evidence of lumbosacral polyneuropathy in the bilateral lower extremities. However, mildly active bilateral L5/S1 radiculopathy cannot be ruled out. Clinical correlation is advised. Asymmetric motor nerve conduction studies in the right lower extremity may be related to focal muscle trauma.     ___________________________  Onel Newman MD        Nerve Conduction Studies  Motor Sites      Latency Neg. Amp Neg. Amp Diff Segment Distance Velocity Neg. Dur Neg Area Diff Temperature Comment   Site (ms) Norm (mV) Norm (%)  cm m/s Norm (ms) (%) ( C)    Left Dp Branch Fibular (TA) Motor   Fibular Head 4.0  < 6.0 7.2 -      9.4  -    Pop Fossa 4.9  < 5.7 7.1 - -1 Pop Fossa-Fibular Head 9.5 106 - 10.3 1 -    Right Dp Branch Fibular (TA) Motor   Fibular Head 4.4  < 6.0 5.9 -      9.1  -    Pop Fossa 5.7  < 5.7 5.7 - -3 Pop Fossa-Fibular Head 8 62 - 9.4 0 -    Left Fibular (EDB) Motor   Ankle 4.8  < 6.0 3.4 -  Ankle-EDB 8   5.9  -    Bel Fibular Head 13.5 - 3.0 - -12 Bel Fibular Head-Ankle 33 38  > 38 5.9 -8 -    Pop Fossa 15.3 - 2.9 - -3 Pop Fossa-Bel Fibular Head 8.7 48  > 38 6.0 -3 -    Right Fibular (EDB) Motor   Ankle NR  < 6.0 NR - NR Ankle-EDB - NR  NR NR - moved G1   Bel Fibular Head NR - NR - NR Bel Fibular Head-Ankle - NR  > 38 NR NR - twitch observed   Pop Fossa NR - NR - NR Pop Fossa-Bel Fibular Head - NR  > 38 NR NR -    Left Tibial (AHB) Motor   Ankle 5.3  < 6.5 5.2  > 5.0  Ankle-AH 9   6.0  -    Knee 15.3 - 4.1 - -21 Knee-Ankle 39 39  > 38 7.5 -4 -    Right Tibial (AHB) Motor   Ankle 4.5  < 6.5 3.1  > 5.0  Ankle-AH 7.5   4.7  - moved G1    Knee 14.6 - 1.44 - -54 Knee-Ankle 39.5 39  > 38 6.9 -27 -      F-Wave Sites      Min F-Lat Max-Min F-Lat Mean F-Lat   Site (ms) (ms) (ms)   Left Fibular F-Wave   Ankle 59.0 19.7 -   Right Tibial F-Wave   Ankle 25.7 44.8 -     Sensory Sites      Onset Lat Peak Lat Amp (O-P) Amp (P-P) Segment Distance Velocity Temperature Comment   Site ms (ms)  V Norm ( V)  cm m/s Norm ( C)    Left Sural Sensory   Calf-Lat Malleolus 3.1 3.7 3  > 8 12 Calf-Lat Malleolus 11.5 37  > 38 28.7    Right Sural Sensory   Calf-Lat Malleolus 2.7 3.6 6  > 8 9 Calf-Lat Malleolus 13 48  > 38 -        Electromyography     Side Muscle Ins Act Fibs/PSW Fasc HF Amp Dur Poly Recrt Int Pat   Right Tib ant Nml None Nml 0 1+ 1+ 0 Nml Nml   Left Tib ant Nml None Nml 0 1+ 1+ 0 Nml Nml   Right Gastroc Incr None Nml 0 1+ 1+ 0 Nml Nml   Left Gastroc Incr None Nml 0 1+ 1+ 0 Donald Nml   Right Vastus med Nml None Nml 0 Nml Nml 0 Nml Nml   Left Vastus med Nml None Nml 0 Nml Nml 0 Nml Nml   Right S1 PSP Nml None Nml 0        Right Gluteus max Nml None Nml 0 Nml Nml 0 Nml Nml         Waveforms / Images:    Motor                    Sensory         F-Wave

## 2025-03-19 ENCOUNTER — TELEPHONE (OUTPATIENT)
Dept: NEUROSURGERY | Facility: CLINIC | Age: 60
End: 2025-03-19
Payer: COMMERCIAL

## 2025-03-19 NOTE — TELEPHONE ENCOUNTER
[facility] Call Center    Phone Message    May a detailed message be left on voicemail: yes     Reason for Call: Other: Patient is returning call for EMG results. Please call back.      Action Taken: Message routed to:  Other: [facility] Neurosurgery    Travel Screening: Not Applicable

## 2025-03-19 NOTE — TELEPHONE ENCOUNTER
Attempted to contact patient to discuss EMG results. Left message to return call. EMG confirms polyneuropathy. No convincing lumbosacral polyneuropathy in the bilateral lower extremities. Recommend referral to neurology for further evaluation/management.

## 2025-05-14 ENCOUNTER — PATIENT OUTREACH (OUTPATIENT)
Dept: CARE COORDINATION | Facility: CLINIC | Age: 60
End: 2025-05-14
Payer: COMMERCIAL

## 2025-05-15 DIAGNOSIS — Z12.11 COLON CANCER SCREENING: ICD-10-CM

## 2025-05-29 ENCOUNTER — ORDERS ONLY (AUTO-RELEASED) (OUTPATIENT)
Dept: URGENT CARE | Facility: CLINIC | Age: 60
End: 2025-05-29
Payer: COMMERCIAL

## 2025-05-29 DIAGNOSIS — Z12.11 COLON CANCER SCREENING: ICD-10-CM

## 2025-07-09 DIAGNOSIS — E11.9 TYPE 2 DIABETES MELLITUS WITHOUT COMPLICATION, WITHOUT LONG-TERM CURRENT USE OF INSULIN (H): ICD-10-CM

## 2025-07-09 RX ORDER — FLASH GLUCOSE SENSOR
KIT MISCELLANEOUS
Qty: 2 EACH | Refills: 1 | Status: SHIPPED | OUTPATIENT
Start: 2025-07-09

## 2025-08-04 ENCOUNTER — PATIENT OUTREACH (OUTPATIENT)
Dept: CARE COORDINATION | Facility: CLINIC | Age: 60
End: 2025-08-04
Payer: COMMERCIAL

## 2025-08-29 PROBLEM — G60.8 POLYNEUROPATHY, PERIPHERAL SENSORIMOTOR AXONAL: Status: ACTIVE | Noted: 2025-01-22

## 2025-09-02 ENCOUNTER — OFFICE VISIT (OUTPATIENT)
Dept: NEUROLOGY | Facility: CLINIC | Age: 60
End: 2025-09-02
Attending: NURSE PRACTITIONER
Payer: COMMERCIAL

## 2025-09-02 VITALS
HEART RATE: 93 BPM | WEIGHT: 293.2 LBS | DIASTOLIC BLOOD PRESSURE: 71 MMHG | SYSTOLIC BLOOD PRESSURE: 108 MMHG | BODY MASS INDEX: 38.68 KG/M2

## 2025-09-02 DIAGNOSIS — R20.2 BILATERAL LEG PARESTHESIA: ICD-10-CM

## 2025-09-02 DIAGNOSIS — G60.8 POLYNEUROPATHY, PERIPHERAL SENSORIMOTOR AXONAL: Primary | ICD-10-CM

## 2025-09-02 PROCEDURE — G2211 COMPLEX E/M VISIT ADD ON: HCPCS | Performed by: PSYCHIATRY & NEUROLOGY

## 2025-09-02 PROCEDURE — 99205 OFFICE O/P NEW HI 60 MIN: CPT | Performed by: PSYCHIATRY & NEUROLOGY

## 2025-09-02 RX ORDER — TRAMADOL HYDROCHLORIDE 50 MG/1
50 TABLET ORAL EVERY 6 HOURS PRN
COMMUNITY

## 2025-09-02 RX ORDER — GABAPENTIN 100 MG/1
100 CAPSULE ORAL 3 TIMES DAILY
Qty: 270 CAPSULE | Refills: 11 | Status: SHIPPED | OUTPATIENT
Start: 2025-09-02

## 2025-09-03 ENCOUNTER — OFFICE VISIT (OUTPATIENT)
Dept: FAMILY MEDICINE | Facility: CLINIC | Age: 60
End: 2025-09-03
Payer: COMMERCIAL

## 2025-09-03 VITALS
WEIGHT: 294 LBS | HEART RATE: 83 BPM | SYSTOLIC BLOOD PRESSURE: 121 MMHG | TEMPERATURE: 98.9 F | BODY MASS INDEX: 38.79 KG/M2 | DIASTOLIC BLOOD PRESSURE: 79 MMHG | RESPIRATION RATE: 18 BRPM | OXYGEN SATURATION: 96 %

## 2025-09-03 DIAGNOSIS — E11.9 TYPE 2 DIABETES MELLITUS WITHOUT COMPLICATION, WITHOUT LONG-TERM CURRENT USE OF INSULIN (H): Primary | ICD-10-CM

## 2025-09-03 LAB
EST. AVERAGE GLUCOSE BLD GHB EST-MCNC: 174 MG/DL
FOLATE SERPL-MCNC: 21.1 NG/ML (ref 4.6–34.8)
HBA1C MFR BLD: 7.7 % (ref 0–5.6)
PROT SERPL-MCNC: 7.5 G/DL (ref 6.4–8.3)
VIT B12 SERPL-MCNC: 547 PG/ML (ref 232–1245)

## 2025-09-03 PROCEDURE — G2211 COMPLEX E/M VISIT ADD ON: HCPCS | Performed by: PHYSICIAN ASSISTANT

## 2025-09-03 PROCEDURE — 86038 ANTINUCLEAR ANTIBODIES: CPT | Performed by: PHYSICIAN ASSISTANT

## 2025-09-03 PROCEDURE — 99000 SPECIMEN HANDLING OFFICE-LAB: CPT | Performed by: PHYSICIAN ASSISTANT

## 2025-09-03 PROCEDURE — 84425 ASSAY OF VITAMIN B-1: CPT | Mod: 90 | Performed by: PHYSICIAN ASSISTANT

## 2025-09-03 PROCEDURE — 99214 OFFICE O/P EST MOD 30 MIN: CPT | Performed by: PHYSICIAN ASSISTANT

## 2025-09-03 PROCEDURE — 84446 ASSAY OF VITAMIN E: CPT | Mod: 90 | Performed by: PHYSICIAN ASSISTANT

## 2025-09-03 PROCEDURE — 36415 COLL VENOUS BLD VENIPUNCTURE: CPT | Performed by: PHYSICIAN ASSISTANT

## 2025-09-03 PROCEDURE — 82607 VITAMIN B-12: CPT | Performed by: PHYSICIAN ASSISTANT

## 2025-09-03 PROCEDURE — 83036 HEMOGLOBIN GLYCOSYLATED A1C: CPT | Performed by: PHYSICIAN ASSISTANT

## 2025-09-03 PROCEDURE — 84207 ASSAY OF VITAMIN B-6: CPT | Mod: 90 | Performed by: PHYSICIAN ASSISTANT

## 2025-09-03 PROCEDURE — 84155 ASSAY OF PROTEIN SERUM: CPT | Performed by: PHYSICIAN ASSISTANT

## 2025-09-03 PROCEDURE — 84630 ASSAY OF ZINC: CPT | Mod: 90 | Performed by: PHYSICIAN ASSISTANT

## 2025-09-03 PROCEDURE — 82746 ASSAY OF FOLIC ACID SERUM: CPT | Performed by: PHYSICIAN ASSISTANT

## 2025-09-03 PROCEDURE — 83921 ORGANIC ACID SINGLE QUANT: CPT | Performed by: PHYSICIAN ASSISTANT

## 2025-09-03 ASSESSMENT — PAIN SCALES - GENERAL: PAINLEVEL_OUTOF10: MODERATE PAIN (6)

## 2025-09-04 LAB
ANA SER QL IF: NEGATIVE
ZINC SERPL-MCNC: 95.2 UG/DL